# Patient Record
Sex: FEMALE | NOT HISPANIC OR LATINO | Employment: FULL TIME | URBAN - METROPOLITAN AREA
[De-identification: names, ages, dates, MRNs, and addresses within clinical notes are randomized per-mention and may not be internally consistent; named-entity substitution may affect disease eponyms.]

---

## 2017-04-10 DIAGNOSIS — I10 ESSENTIAL HYPERTENSION: ICD-10-CM

## 2017-04-10 DIAGNOSIS — E55.9 VITAMIN D DEFICIENCY: ICD-10-CM

## 2017-04-11 RX ORDER — HYDROCHLOROTHIAZIDE 12.5 MG/1
TABLET ORAL
Qty: 90 TABLET | Refills: 2 | Status: SHIPPED | OUTPATIENT
Start: 2017-04-11 | End: 2017-12-11

## 2017-04-11 RX ORDER — CHOLECALCIFEROL (VITAMIN D3) 50 MCG
TABLET ORAL
Qty: 90 TABLET | Refills: 1 | Status: SHIPPED | OUTPATIENT
Start: 2017-04-11 | End: 2017-11-08

## 2017-04-12 ENCOUNTER — TELEPHONE (OUTPATIENT)
Dept: FAMILY MEDICINE | Facility: CLINIC | Age: 62
End: 2017-04-12

## 2017-04-12 DIAGNOSIS — E78.5 HYPERLIPIDEMIA LDL GOAL <130: ICD-10-CM

## 2017-04-12 DIAGNOSIS — I10 ESSENTIAL HYPERTENSION: ICD-10-CM

## 2017-04-12 RX ORDER — SIMVASTATIN 40 MG
40 TABLET ORAL AT BEDTIME
Qty: 90 TABLET | Refills: 1 | Status: SHIPPED | OUTPATIENT
Start: 2017-04-12 | End: 2017-12-11

## 2017-04-12 RX ORDER — LISINOPRIL 20 MG/1
20 TABLET ORAL DAILY
Qty: 90 TABLET | Refills: 1 | Status: SHIPPED | OUTPATIENT
Start: 2017-04-12 | End: 2017-10-11

## 2017-04-12 NOTE — TELEPHONE ENCOUNTER
Prescription approved per [facility] Refill Protocol.  Silvana Loco RN

## 2017-04-12 NOTE — TELEPHONE ENCOUNTER
Reason for Call:  Other prescription    Detailed comments: lisprinol /simvastatin pt got other meds approved but not these 2 as was on previous request    Phone Number Patient can be reached at: Cell number on file:    Telephone Information:   Mobile [phone]       Best Time: anyu    Can we leave a detailed message on this number? YES    Call taken on 4/12/2017 at 7:10 AM by Yue Holcomb

## 2017-04-18 ENCOUNTER — TELEPHONE (OUTPATIENT)
Dept: FAMILY MEDICINE | Facility: CLINIC | Age: 62
End: 2017-04-18

## 2017-04-18 NOTE — TELEPHONE ENCOUNTER
Reason for Call:  Other appointment    Detailed comments: pt would like to set up new pt consult for her son. Pt was given ok'd personally by PS. Please confirm ok'd for new pt consult.    Phone Number Patient can be reached at: Cell number on file:    Telephone Information:   Mobile [phone]       Best Time: any    Can we leave a detailed message on this number? YES    Call taken on 4/18/2017 at 1:56 PM by Curt Henson

## 2017-04-18 NOTE — TELEPHONE ENCOUNTER
Spoke with mom and advised that Dr. Baxter would accept pt. It looks like this is   A pt who is established with Dr. Malagon. I will note in the pt's chart that Dr. Baxter with accept pt.

## 2017-05-19 ENCOUNTER — ALLIED HEALTH/NURSE VISIT (OUTPATIENT)
Dept: NURSING | Facility: CLINIC | Age: 62
End: 2017-05-19
Payer: COMMERCIAL

## 2017-05-19 DIAGNOSIS — Z11.1 SCREENING EXAMINATION FOR PULMONARY TUBERCULOSIS: Primary | ICD-10-CM

## 2017-05-19 PROCEDURE — 86580 TB INTRADERMAL TEST: CPT

## 2017-05-19 PROCEDURE — 99207 ZZC NO CHARGE NURSE ONLY: CPT

## 2017-05-19 NOTE — LETTER
[facility]  [address]  [facility] [address]  [phone]          May 19, 2017    RE:  Teresa Herron                                                                                                      1955                                                 [address]   [facility] [address]            To whom it may concern: [facility] - Nursing Staff    Teresa Herron was seen by our clinic today for her Mantoux administration. Below you will find the details of the administration of this Mantoux test:    Tubersol - [employer] Rosemaryuer  NDC: 05234-494-74  Lot: [id number]  Exp: 11/3/18  Administered by Catarino Walsh CMA at 8 am on 5/19/17    Reading must take place between 5/21/17 - 5/22/17. Please fax us the results of this reading so we can close this chart. You can fax this to [phone], attn: nursing staff.    If there are any questions please call our office at [phone]    Thanks,        Catarino Walsh CMA   [facility]

## 2017-05-19 NOTE — MR AVS SNAPSHOT
"              After Visit Summary   5/19/2017    Teresa Herron    MRN: [id number]           Patient Information     Date Of Birth          1955        Visit Information        Provider Department      5/19/2017 9:45 AM CS NURSE [facility]        Today's Diagnoses     Screening examination for pulmonary tuberculosis    -  1       Follow-ups after your visit        Your next 10 appointments already scheduled     May 19, 2017  9:45 AM CDT   Nurse Only with [facility]    [address]  [place] [address] [phone]              Who to contact     If you have questions or need follow up information about today's clinic visit or your schedule please contact [facility] directly at [phone].  Normal or non-critical lab and imaging results will be communicated to you by ViralGainshart, letter or phone within 4 business days after the clinic has received the results. If you do not hear from us within 7 days, please contact the clinic through ViralGainshart or phone. If you have a critical or abnormal lab result, we will notify you by phone as soon as possible.  Submit refill requests through Veritract or call your pharmacy and they will forward the refill request to us. Please allow 3 business days for your refill to be completed.          Additional Information About Your Visit        ViralGainshart Information     Veritract lets you send messages to your doctor, view your test results, renew your prescriptions, schedule appointments and more. To sign up, go to [email or web address] . Click on \"Log in\" on the left side of the screen, which will take you to the Welcome page. Then click on \"Sign up Now\" on the right side of the page.     You will be asked to enter the access code listed below, as well as some personal information. Please follow the directions to create your username and password.     Your access code is: [email or web address]  Expires: 8/17/2017  8:41 " AM     Your access code will  in 90 days. If you need help or a new code, please call your [place] clinic or [phone].        Care EveryWhere ID     This is your Care EveryWhere ID. This could be used by other organizations to access your [place] medical records  [id number]         Blood Pressure from Last 3 Encounters:   16 135/88   16 126/88   10/23/15 119/81    Weight from Last 3 Encounters:   16 148 lb 14.4 oz (67.5 kg)   16 152 lb 1.6 oz (69 kg)   10/23/15 147 lb (66.7 kg)              We Performed the Following     TB INTRADERMAL TEST        Primary Care Provider Office Phone # Fax #    Yoni Baxter [phone]       [facility] [address]  [place] [address]        Thank you!     Thank you for choosing [facility]  for your care. Our goal is always to provide you with excellent care. Hearing back from our patients is one way we can continue to improve our services. Please take a few minutes to complete the written survey that you may receive in the mail after your visit with us. Thank you!             Your Updated Medication List - Protect others around you: Learn how to safely use, store and throw away your medicines at www.disposemymeds.org.          This list is accurate as of: 17  8:41 AM.  Always use your most recent med list.                   Brand Name Dispense Instructions for use    ASPIRIN NOT PRESCRIBED    INTENTIONAL    1 each    1 each continuous prn for other Antiplatelet medication not prescribed intentionally due to Not indicated based on age       hydrochlorothiazide 12.5 MG Tabs tablet     90 tablet    TAKE ONE TABLET BY MOUTH ONE TIME DAILY       lisinopril 20 MG tablet    PRINIVIL/ZESTRIL    90 tablet    Take 1 tablet (20 mg) by mouth daily       simvastatin 40 MG tablet    ZOCOR    90 tablet    Take 1 tablet (40 mg) by mouth At Bedtime       vitamin D 2000 UNITS tablet     90 tablet     TAKE ONE TABLET BY MOUTH ONE TIME DAILY

## 2017-05-19 NOTE — PROGRESS NOTES
[phone], fax [phone].    Also requesting that [employer] fax us results of the mantoux.    Catarino Walsh, CMA

## 2017-06-06 ENCOUNTER — ALLIED HEALTH/NURSE VISIT (OUTPATIENT)
Dept: NURSING | Facility: CLINIC | Age: 62
End: 2017-06-06
Payer: COMMERCIAL

## 2017-06-06 DIAGNOSIS — Z11.1 SCREENING EXAMINATION FOR PULMONARY TUBERCULOSIS: Primary | ICD-10-CM

## 2017-06-06 PROCEDURE — 86580 TB INTRADERMAL TEST: CPT

## 2017-06-06 PROCEDURE — 99207 ZZC NO CHARGE NURSE ONLY: CPT

## 2017-06-06 NOTE — NURSING NOTE
"Chief Complaint   Patient presents with     Mantoux Administration       Initial There were no vitals taken for this visit. Estimated body mass index is 31.12 kg/(m^2) as calculated from the following:    Height as of 11/28/16: 4' 10\" (1.473 m).    Weight as of 11/28/16: 148 lb 14.4 oz (67.5 kg).  Medication Reconciliation: unable or not appropriate to perform     Linda Payne MA    Per orders of Dr. Baxter, injection of mantoux given by Linda Payne. Patient instructed to remain in clinic for 20 minutes afterwards, and to report any adverse reaction to me immediately.      "

## 2017-06-08 ENCOUNTER — MEDICAL CORRESPONDENCE (OUTPATIENT)
Dept: HEALTH INFORMATION MANAGEMENT | Facility: CLINIC | Age: 62
End: 2017-06-08

## 2017-10-11 DIAGNOSIS — I10 ESSENTIAL HYPERTENSION: ICD-10-CM

## 2017-10-11 RX ORDER — LISINOPRIL 20 MG/1
TABLET ORAL
Qty: 30 TABLET | Refills: 1 | Status: SHIPPED | OUTPATIENT
Start: 2017-10-11 | End: 2017-12-11

## 2017-10-11 NOTE — TELEPHONE ENCOUNTER
Prescription approved per [facility] Refill Protocol.    Annmarie Pierre, BS, RN, N  [facility]) [phone]

## 2017-10-11 NOTE — TELEPHONE ENCOUNTER
lisinopril (PRINIVIL/ZESTRIL) 20 MG tablet        Last Written Prescription Date: 4/12/2017  Last Fill Quantity: 90, # refills: 1  Last Office Visit with G, [facility] or [facility] prescribing provider: 11/28/2016       Potassium   Date Value Ref Range Status   11/28/2016 4.2 3.4 - 5.3 mmol/L Final     Creatinine   Date Value Ref Range Status   11/28/2016 0.65 0.52 - 1.04 mg/dL Final     BP Readings from Last 3 Encounters:   11/28/16 135/88   04/04/16 126/88   10/23/15 119/81

## 2017-11-08 DIAGNOSIS — E55.9 VITAMIN D DEFICIENCY: ICD-10-CM

## 2017-11-09 RX ORDER — CHOLECALCIFEROL (VITAMIN D3) 50 MCG
TABLET ORAL
Qty: 30 TABLET | Refills: 0 | Status: SHIPPED | OUTPATIENT
Start: 2017-11-09 | End: 2020-09-04

## 2017-11-09 NOTE — TELEPHONE ENCOUNTER
Medication is being filled for 1 time refill only due to:  Patient needs to be seen because due for physical this month.   Tianna WILCOX RN    Requested Prescriptions   Pending Prescriptions Disp Refills     Cholecalciferol (VITAMIN D) 2000 UNITS tablet [Pharmacy Med Name: VITAMIN D3 2,000 UNIT TABLET] 90 tablet 1     Sig: TAKE ONE TABLET BY MOUTH ONE TIME DAILY    Vitamin Supplements (Adult) Protocol Passed    11/8/2017  6:56 PM       Passed - High dose Vitamin D not ordered       Passed - Recent or future visit with authorizing provider's specialty    Patient had office visit in the last year or has a visit in the next 30 days with authorizing provider.  See chart review.              Passed - Patient is age 18 or older

## 2017-11-17 ENCOUNTER — TELEPHONE (OUTPATIENT)
Dept: FAMILY MEDICINE | Facility: CLINIC | Age: 62
End: 2017-11-17

## 2017-11-17 NOTE — TELEPHONE ENCOUNTER
Reason for Call:  Other appointment    Detailed comments: The patient wants to have a physical on Monday   First available was offered on Wednesday 11/22 at 1:30   But she wants to have it done on Monday   The patient said she wants her labs done and she has not been feeling well  And she wants a referral to Ortho     Phone Number Patient can be reached at: Cell number on file:    Telephone Information:   Mobile [phone]       Best Time: anytime    Can we leave a detailed message on this number? YES    Call taken on 11/17/2017 at 9:45 AM by Lyndsey Pascal

## 2017-11-17 NOTE — TELEPHONE ENCOUNTER
Sorry, but don't do work in for routine physicals.  If she has issues and wants to just address those then can do work in but will be limited office visit, let me know    Thanks    Yoni Baxter M.D.

## 2017-11-28 ENCOUNTER — TRANSFERRED RECORDS (OUTPATIENT)
Dept: HEALTH INFORMATION MANAGEMENT | Facility: CLINIC | Age: 62
End: 2017-11-28

## 2017-12-11 ENCOUNTER — OFFICE VISIT (OUTPATIENT)
Dept: FAMILY MEDICINE | Facility: CLINIC | Age: 62
End: 2017-12-11
Payer: COMMERCIAL

## 2017-12-11 VITALS
WEIGHT: 147.2 LBS | DIASTOLIC BLOOD PRESSURE: 77 MMHG | HEIGHT: 58 IN | BODY MASS INDEX: 30.9 KG/M2 | SYSTOLIC BLOOD PRESSURE: 131 MMHG | HEART RATE: 63 BPM | TEMPERATURE: 97.6 F | OXYGEN SATURATION: 97 %

## 2017-12-11 DIAGNOSIS — Z00.00 ROUTINE GENERAL MEDICAL EXAMINATION AT A HEALTH CARE FACILITY: Primary | ICD-10-CM

## 2017-12-11 DIAGNOSIS — I10 BENIGN ESSENTIAL HYPERTENSION: ICD-10-CM

## 2017-12-11 DIAGNOSIS — E11.9 TYPE 2 DIABETES MELLITUS WITHOUT COMPLICATION, WITHOUT LONG-TERM CURRENT USE OF INSULIN (H): ICD-10-CM

## 2017-12-11 DIAGNOSIS — Z23 NEED FOR PROPHYLACTIC VACCINATION AND INOCULATION AGAINST INFLUENZA: ICD-10-CM

## 2017-12-11 DIAGNOSIS — E55.9 VITAMIN D DEFICIENCY: ICD-10-CM

## 2017-12-11 DIAGNOSIS — E66.9 NON MORBID OBESITY, UNSPECIFIED OBESITY TYPE: ICD-10-CM

## 2017-12-11 DIAGNOSIS — E78.5 HYPERLIPIDEMIA LDL GOAL <130: ICD-10-CM

## 2017-12-11 LAB
ERYTHROCYTE [DISTWIDTH] IN BLOOD BY AUTOMATED COUNT: 14.9 % (ref 10–15)
HBA1C MFR BLD: 6.5 % (ref 4.3–6)
HCT VFR BLD AUTO: 39.5 % (ref 35–47)
HGB BLD-MCNC: 12.4 G/DL (ref 11.7–15.7)
MCH RBC QN AUTO: 28 PG (ref 26.5–33)
MCHC RBC AUTO-ENTMCNC: 31.4 G/DL (ref 31.5–36.5)
MCV RBC AUTO: 89 FL (ref 78–100)
PLATELET # BLD AUTO: 256 10E9/L (ref 150–450)
RBC # BLD AUTO: 4.43 10E12/L (ref 3.8–5.2)
WBC # BLD AUTO: 12 10E9/L (ref 4–11)

## 2017-12-11 PROCEDURE — 84443 ASSAY THYROID STIM HORMONE: CPT | Performed by: INTERNAL MEDICINE

## 2017-12-11 PROCEDURE — 82043 UR ALBUMIN QUANTITATIVE: CPT | Performed by: INTERNAL MEDICINE

## 2017-12-11 PROCEDURE — 99207 C FOOT EXAM  NO CHARGE: CPT | Mod: 25 | Performed by: INTERNAL MEDICINE

## 2017-12-11 PROCEDURE — 99396 PREV VISIT EST AGE 40-64: CPT | Mod: 25 | Performed by: INTERNAL MEDICINE

## 2017-12-11 PROCEDURE — 80053 COMPREHEN METABOLIC PANEL: CPT | Performed by: INTERNAL MEDICINE

## 2017-12-11 PROCEDURE — 90686 IIV4 VACC NO PRSV 0.5 ML IM: CPT | Performed by: INTERNAL MEDICINE

## 2017-12-11 PROCEDURE — 90471 IMMUNIZATION ADMIN: CPT | Performed by: INTERNAL MEDICINE

## 2017-12-11 PROCEDURE — 83036 HEMOGLOBIN GLYCOSYLATED A1C: CPT | Performed by: INTERNAL MEDICINE

## 2017-12-11 PROCEDURE — 82306 VITAMIN D 25 HYDROXY: CPT | Performed by: INTERNAL MEDICINE

## 2017-12-11 PROCEDURE — 85027 COMPLETE CBC AUTOMATED: CPT | Performed by: INTERNAL MEDICINE

## 2017-12-11 PROCEDURE — 36415 COLL VENOUS BLD VENIPUNCTURE: CPT | Performed by: INTERNAL MEDICINE

## 2017-12-11 PROCEDURE — 80061 LIPID PANEL: CPT | Performed by: INTERNAL MEDICINE

## 2017-12-11 RX ORDER — HYDROCHLOROTHIAZIDE 12.5 MG/1
12.5 TABLET ORAL DAILY
Qty: 90 TABLET | Refills: 3 | Status: SHIPPED | OUTPATIENT
Start: 2017-12-11 | End: 2018-08-13

## 2017-12-11 RX ORDER — SIMVASTATIN 40 MG
40 TABLET ORAL AT BEDTIME
Qty: 90 TABLET | Refills: 3 | Status: SHIPPED | OUTPATIENT
Start: 2017-12-11 | End: 2018-08-13

## 2017-12-11 RX ORDER — LISINOPRIL 20 MG/1
TABLET ORAL
Qty: 90 TABLET | Refills: 3 | Status: SHIPPED | OUTPATIENT
Start: 2017-12-11 | End: 2018-08-13

## 2017-12-11 RX ORDER — OMEGA-3 FATTY ACIDS/FISH OIL 300-1000MG
1 CAPSULE ORAL DAILY
Qty: 90 CAPSULE | COMMUNITY
End: 2024-06-17

## 2017-12-11 NOTE — NURSING NOTE
"Chief Complaint   Patient presents with     Physical       Initial /77 (BP Location: Right arm, Patient Position: Chair, Cuff Size: Adult Large)  Pulse 63  Temp 97.6  F (36.4  C) (Oral)  Ht 4' 10\" (1.473 m)  Wt 147 lb 3.2 oz (66.8 kg)  SpO2 97%  Breastfeeding? No  BMI 30.76 kg/m2 Estimated body mass index is 30.76 kg/(m^2) as calculated from the following:    Height as of this encounter: 4' 10\" (1.473 m).    Weight as of this encounter: 147 lb 3.2 oz (66.8 kg).  Medication Reconciliation: complete.  Leslie Jaffe CMA    "

## 2017-12-11 NOTE — MR AVS SNAPSHOT
After Visit Summary   12/11/2017    Teresa Herron    MRN: [id number]           Patient Information     Date Of Birth          1955        Visit Information        Provider Department      12/11/2017 12:30 PM Yoni Baxter MD [facility]        Today's Diagnoses     Routine general medical examination at a health care facility    -  1    Hyperlipidemia LDL goal <130        Type 2 diabetes mellitus without complication, without long-term current use of insulin (H)        Vitamin D deficiency        Benign essential hypertension        Non morbid obesity, unspecified obesity type          Care Instructions    I will send you the labs from today.    Please get a carpal tunnel wrist splint and wear that at night in bed and see if this fixes the numbness in your hands and arm.    Yoni Baxter M.D.                Preventive Health Recommendations  Female Ages 50 - 64    Yearly exam: See your health care provider every year in order to  o Review health changes.   o Discuss preventive care.    o Review your medicines if your doctor has prescribed any.      Get a Pap test every three years (unless you have an abnormal result and your provider advises testing more often).    If you get Pap tests with HPV test, you only need to test every 5 years, unless you have an abnormal result.     You do not need a Pap test if your uterus was removed (hysterectomy) and you have not had cancer.    You should be tested each year for STDs (sexually transmitted diseases) if you're at risk.     Have a mammogram every 1 to 2 years.    Have a colonoscopy at age 50, or have a yearly FIT test (stool test). These exams screen for colon cancer.      Have a cholesterol test every 5 years, or more often if advised.    Have a diabetes test (fasting glucose) every three years. If you are at risk for diabetes, you should have this test more often.     If you are at risk for osteoporosis (brittle bone disease),  "think about having a bone density scan (DEXA).    Shots: Get a flu shot each year. Get a tetanus shot every 10 years.    Nutrition:     Eat at least 5 servings of fruits and vegetables each day.    Eat whole-grain bread, whole-wheat pasta and brown rice instead of white grains and rice.    Talk to your provider about Calcium and Vitamin D.     Lifestyle    Exercise at least 150 minutes a week (30 minutes a day, 5 days a week). This will help you control your weight and prevent disease.    Limit alcohol to one drink per day.    No smoking.     Wear sunscreen to prevent skin cancer.     See your dentist every six months for an exam and cleaning.    See your eye doctor every 1 to 2 years.            Follow-ups after your visit        Who to contact     If you have questions or need follow up information about today's clinic visit or your schedule please contact [facility] directly at [phone].  Normal or non-critical lab and imaging results will be communicated to you by MyChart, letter or phone within 4 business days after the clinic has received the results. If you do not hear from us within 7 days, please contact the clinic through MBW Enterprisehart or phone. If you have a critical or abnormal lab result, we will notify you by phone as soon as possible.  Submit refill requests through Hatsize or call your pharmacy and they will forward the refill request to us. Please allow 3 business days for your refill to be completed.          Additional Information About Your Visit        Hatsize Information     Hatsize lets you send messages to your doctor, view your test results, renew your prescriptions, schedule appointments and more. To sign up, go to [email or web address] . Click on \"Log in\" on the left side of the screen, which will take you to the Welcome page. Then click on \"Sign up Now\" on the right side of the page.     You will be asked to enter the access code listed below, as well as some personal " "information. Please follow the directions to create your username and password.     Your access code is: [email or web address]  Expires: 3/11/2018 12:56 PM     Your access code will  in 90 days. If you need help or a new code, please call your [place] clinic or [phone].        Care EveryWhere ID     This is your Care EveryWhere ID. This could be used by other organizations to access your [place] medical records  [id number]        Your Vitals Were     Pulse Temperature Height Pulse Oximetry Breastfeeding? BMI (Body Mass Index)    63 97.6  F (36.4  C) (Oral) 4' 10\" (1.473 m) 97% No 30.76 kg/m2       Blood Pressure from Last 3 Encounters:   17 131/77   16 135/88   16 126/88    Weight from Last 3 Encounters:   17 147 lb 3.2 oz (66.8 kg)   16 148 lb 14.4 oz (67.5 kg)   16 152 lb 1.6 oz (69 kg)              We Performed the Following     Albumin Random Urine Quantitative with Creat Ratio     C FOOT EXAM  NO CHARGE     CBC with platelets     Comprehensive metabolic panel     Hemoglobin A1c     Lipid panel reflex to direct LDL Non-fasting     TSH with free T4 reflex     Vitamin D Deficiency          Today's Medication Changes          These changes are accurate as of: 17 12:56 PM.  If you have any questions, ask your nurse or doctor.               These medicines have changed or have updated prescriptions.        Dose/Directions    hydrochlorothiazide 12.5 MG Tabs tablet   This may have changed:  See the new instructions.   Used for:  Benign essential hypertension   Changed by:  Yoni Baxter MD        Dose:  12.5 mg   Take 1 tablet (12.5 mg) by mouth daily   Quantity:  90 tablet   Refills:  3       lisinopril 20 MG tablet   Commonly known as:  PRINIVIL/ZESTRIL   This may have changed:  See the new instructions.   Used for:  Benign essential hypertension   Changed by:  Yoni Baxter MD        TAKE 1 TABLET (20 MG) BY MOUTH DAILY   Quantity:  90 tablet   Refills: "  3            Where to get your medicines      These medications were sent to [facility] [address] [facility] - [place], [place] - [address]   [address] , [facility] [address]     Phone:  [phone]     hydrochlorothiazide 12.5 MG Tabs tablet    lisinopril 20 MG tablet    simvastatin 40 MG tablet                Primary Care Provider Office Phone # Fax #    Yoni Baxter [phone] [address] [facility] [address]  [facility] [address]        Equal Access to Services     [facility]OUGL[facility] : Hadii aad ku hadasho Soomaali, waaxda luqadaha, qaybta kaalmada adeegyada, waxay idiin hayaan adeeg khararichard nova . So [facility] [phone].    ATENCIÓN: Si habla español, tiene a farias disposición servicios gratuitos de asistencia lingüística. [facility] [phone].    We comply with applicable federal civil rights laws and [place] laws. We do not discriminate on the basis of race, color, national origin, age, disability, sex, sexual orientation, or gender identity.            Thank you!     Thank you for choosing [facility]  for your care. Our goal is always to provide you with excellent care. Hearing back from our patients is one way we can continue to improve our services. Please take a few minutes to complete the written survey that you may receive in the mail after your visit with us. Thank you!             Your Updated Medication List - Protect others around you: Learn how to safely use, store and throw away your medicines at www.disposemymeds.org.          This list is accurate as of: 12/11/17 12:56 PM.  Always use your most recent med list.                   Brand Name Dispense Instructions for use Diagnosis    ASPIRIN NOT PRESCRIBED    INTENTIONAL    1 each    1 each continuous prn for other Antiplatelet medication not prescribed intentionally due to Not indicated based on age    Type 2 diabetes, HbA1c goal < 7% (H)       hydrochlorothiazide 12.5 MG Tabs tablet     90 tablet    Take 1 tablet (12.5 mg)  by mouth daily    Benign essential hypertension       lisinopril 20 MG tablet    PRINIVIL/ZESTRIL    90 tablet    TAKE 1 TABLET (20 MG) BY MOUTH DAILY    Benign essential hypertension       omega 3 1000 MG Caps     90 capsule    Take 1 g by mouth daily        simvastatin 40 MG tablet    ZOCOR    90 tablet    Take 1 tablet (40 mg) by mouth At Bedtime    Hyperlipidemia LDL goal <130       vitamin D 2000 UNITS tablet     30 tablet    TAKE ONE TABLET BY MOUTH ONE TIME DAILY    Vitamin D deficiency

## 2017-12-11 NOTE — PROGRESS NOTES
SUBJECTIVE:   CC: Teresa Herron is an 62 year old woman who presents for preventive health visit.     The patient has niddm not on meds for it, does not check sugars, up to date eye exam, no sores on feet or toes.    She has djd of knees and seeing ortho for this    Some t/n fingers and forearms at night, sometimes in day, comes and goes.    She is not working out a lot but busy all day long.  No chest pain or shortness of breath.  No gi or genitourinary c/o.  Occasionally cramps in feet or toes, no sid, not always taking the statin reg.    Healthy Habits:    Do you get at least three servings of calcium containing foods daily (dairy, green leafy vegetables, etc.)? yes    Amount of exercise or daily activities, outside of work: walks 7 day(s) per week    Problems taking medications regularly No    Medication side effects: No    Have you had an eye exam in the past two years? yes    Do you see a dentist twice per year? no    Do you have sleep apnea, excessive snoring or daytime drowsiness?no            Today's PHQ-2 Score:   PHQ-2 ( 1999 Pfizer) 11/28/2016 10/23/2015   Q1: Little interest or pleasure in doing things 0 0   Q2: Feeling down, depressed or hopeless 0 0   PHQ-2 Score 0 0         Abuse: Current or Past(Physical, Sexual or Emotional)- No  Do you feel safe in your environment - Yes  Social History   Substance Use Topics     Smoking status: Never Smoker     Smokeless tobacco: Never Used     Alcohol use No     The patient does not drink >3 drinks per day nor >7 drinks per week.                Past Medical History:      Past Medical History:   Diagnosis Date     Chest pain 4/15    nl est echo     Dizziness years     GERD (gastroesophageal reflux disease)      H/O colonoscopy 2014    tics, tortuous, fu 5 years     HTN (hypertension) 1988     Hypercholesteraemia 2010     Ruptured appendicitis 5/14    had surgery fsd     Type II or unspecified type diabetes mellitus without mention of complication, not  stated as uncontrolled      Vitamin D deficiency              Past Surgical History:      Past Surgical History:   Procedure Laterality Date     COLONOSCOPY N/A 11/18/2014    Procedure: COLONOSCOPY;  Surgeon: Yoni Carvajal MD;  Location:  GI     fibroid surgery  2000     HYSTERECTOMY, PAP NO LONGER INDICATED  2006    estrada/bso due to pain and bleeding     LAPAROSCOPIC APPENDECTOMY  5/19/2014    Procedure: LAPAROSCOPIC APPENDECTOMY;  Surgeon: Will Barth MD;  Location: [facility]             Social History:     Social History     Social History     Marital status:      Spouse name: N/A     Number of children: 1     Years of education: N/A     Occupational History     work dietary, in kitchen [employer]     Social History Main Topics     Smoking status: Never Smoker     Smokeless tobacco: Never Used     Alcohol use No     Drug use: No     Sexual activity: Not Currently     Other Topics Concern     Not on file     Social History Narrative             Family History:   reviewed         Allergies:   No Known Allergies          Medications:     Current Outpatient Prescriptions   Medication Sig Dispense Refill     omega 3 1000 MG CAPS Take 1 g by mouth daily 90 capsule      lisinopril (PRINIVIL/ZESTRIL) 20 MG tablet TAKE 1 TABLET (20 MG) BY MOUTH DAILY 90 tablet 3     simvastatin (ZOCOR) 40 MG tablet Take 1 tablet (40 mg) by mouth At Bedtime 90 tablet 3     hydrochlorothiazide 12.5 MG TABS tablet Take 1 tablet (12.5 mg) by mouth daily 90 tablet 3     Cholecalciferol (VITAMIN D) 2000 UNITS tablet TAKE ONE TABLET BY MOUTH ONE TIME DAILY 30 tablet 0     ASPIRIN NOT PRESCRIBED, INTENTIONAL, 1 each continuous prn for other Antiplatelet medication not prescribed intentionally due to Not indicated based on age 1 each 0     [DISCONTINUED] lisinopril (PRINIVIL/ZESTRIL) 20 MG tablet TAKE 1 TABLET (20 MG) BY MOUTH DAILY 30 tablet 1     [DISCONTINUED] simvastatin (ZOCOR) 40 MG tablet Take 1 tablet (40  "mg) by mouth At Bedtime 90 tablet 1     [DISCONTINUED] hydrochlorothiazide 12.5 MG TABS tablet TAKE ONE TABLET BY MOUTH ONE TIME DAILY 90 tablet 2               Review of Systems:   The 10 point Review of Systems is negative other than noted in the HPI           Physical Exam:   Blood pressure 131/77, pulse 63, temperature 97.6  F (36.4  C), temperature source Oral, height 4' 10\" (1.473 m), weight 147 lb 3.2 oz (66.8 kg), SpO2 97 %, not currently breastfeeding.    Exam:  Constitutional: healthy appearing, alert and in no distress  Heent: Normocephalic. Head without obvious masses or lesions. PERRLDC, EOMI. Mouth exam within normal limits: tongue, mucous membranes, posterior pharynx all normal, no lesions or abnormalities seen.  Tm's and canals within normal limits bilaterally. Neck supple, no nuchal rigidity or masses. No supraclavicular, or cervical adenopathy. Thyroid symmetric, no masses.  Cardiovascular: Regular rate and rhythm, no murmer, rub or gallops.  JVP not elevated, no edema.  Carotids within normal limits bilaterally, no bruits.  Respiratory: Normal respiratory effort.  Lungs clear, normal flow, no wheezing or crackles.  Breasts: Normal bilaterally.  No masses or lesions.  Nipples within normal limits.  No axillary lesions or nodes.  My M.A. Was present during this part of the examination.  Gastrointestinal: Normal active bowel sounds.   Soft, not tender, no masses, guarding or rebound.  No hepatosplenomegaly.   Musculoskeletal: extremities normal, no gross deformities noted.  Skin: no suspicious lesions or rashes   Neurologic: Mental status within normal limits.  Speech fluent.  No gross motor abnormalities and gait intact.  Psychiatric: mentation appears normal and affect normal.         Data:   Labs sent        Assessment:   1. Normal complete physical exam  2. Niddm, not on meds, no c/o  3. Elevated cholesterol, not always taking statin  4. Vit d defic, follow up today  5. Hypertension, control " ok  6. 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 Obesity  8. hcm         Plan:   Flu shot  Letter with labs  Exercise, diet and weight loss  Try ct splint and if not better soon call        Yoni Baxter M.D.                                Injectable Influenza Immunization Documentation    1.  Is the person to be vaccinated sick today?   No    2. Does the person to be vaccinated have an allergy to a component   of the vaccine?   No  Egg Allergy Algorithm Link    3. Has the person to be vaccinated ever had a serious reaction   to influenza vaccine in the past?   No    4. Has the person to be vaccinated ever had Guillain-Barré syndrome?   No    Form completed by Leslie Jaffe CMA

## 2017-12-11 NOTE — PATIENT INSTRUCTIONS
I will send you the labs from today.    Please get a carpal tunnel wrist splint and wear that at night in bed and see if this fixes the numbness in your hands and arm.    Yoni Baxter M.D.                Preventive Health Recommendations  Female Ages 50 - 64    Yearly exam: See your health care provider every year in order to  o Review health changes.   o Discuss preventive care.    o Review your medicines if your doctor has prescribed any.      Get a Pap test every three years (unless you have an abnormal result and your provider advises testing more often).    If you get Pap tests with HPV test, you only need to test every 5 years, unless you have an abnormal result.     You do not need a Pap test if your uterus was removed (hysterectomy) and you have not had cancer.    You should be tested each year for STDs (sexually transmitted diseases) if you're at risk.     Have a mammogram every 1 to 2 years.    Have a colonoscopy at age 50, or have a yearly FIT test (stool test). These exams screen for colon cancer.      Have a cholesterol test every 5 years, or more often if advised.    Have a diabetes test (fasting glucose) every three years. If you are at risk for diabetes, you should have this test more often.     If you are at risk for osteoporosis (brittle bone disease), think about having a bone density scan (DEXA).    Shots: Get a flu shot each year. Get a tetanus shot every 10 years.    Nutrition:     Eat at least 5 servings of fruits and vegetables each day.    Eat whole-grain bread, whole-wheat pasta and brown rice instead of white grains and rice.    Talk to your provider about Calcium and Vitamin D.     Lifestyle    Exercise at least 150 minutes a week (30 minutes a day, 5 days a week). This will help you control your weight and prevent disease.    Limit alcohol to one drink per day.    No smoking.     Wear sunscreen to prevent skin cancer.     See your dentist every six months for an exam and  cleaning.    See your eye doctor every 1 to 2 years.

## 2017-12-11 NOTE — LETTER
[facility]  [address] [facility]  [address]  [place]  [address]  Tel: [phone]    December 13, 2017    Teresa Herron  [address]   [facility] [address]        Dear Ms. Herron,    It was a pleasure seeing you.  I wanted to get back to you with your test results.  I have enclosed a copy for your records.     For the most part your labs look very good including your complete blood count, blood salts, kidney tests, liver tests, proteins, vitamin D level, urine, and thyroid test.     Your diabetes test or a1c is a bit higher this time at 6.5.   Please be sure to exercise and keep your weight down for this.     Your cholesterol is much too high as you can see.  I know you have not been taking the simvastatin regularly but it is important to get this down.  There are other cholesterol medications we could try, if you are having side effects with the simvastatin.  Please let me know if you want to do this, otherwise if you can, please take the simvastatin daily.  I would ask that you come back to see me in 6 months for a follow up office visit and to repeat some of the labs.     If you have any questions please call me.       Sincerely,    Yoni Baxter MD/faheem    Results for orders placed or performed in visit on 12/11/17   CBC with platelets   Result Value Ref Range    WBC 12.0 (H) 4.0 - 11.0 10e9/L    RBC Count 4.43 3.8 - 5.2 10e12/L    Hemoglobin 12.4 11.7 - 15.7 g/dL    Hematocrit 39.5 35.0 - 47.0 %    MCV 89 78 - 100 fl    MCH 28.0 26.5 - 33.0 pg    MCHC 31.4 (L) 31.5 - 36.5 g/dL    RDW 14.9 10.0 - 15.0 %    Platelet Count 256 150 - 450 10e9/L   Comprehensive metabolic panel   Result Value Ref Range    Sodium 138 133 - 144 mmol/L    Potassium 3.9 3.4 - 5.3 mmol/L    Chloride 104 94 - 109 mmol/L    Carbon Dioxide 23 20 - 32 mmol/L    Anion Gap 11 3 - 14 mmol/L    Glucose 85 70 - 99 mg/dL    Urea Nitrogen 17 7 - 30 mg/dL    Creatinine 0.58 0.52 - 1.04 mg/dL    GFR Estimate >90 >60  mL/min/1.7m2    GFR Estimate If Black >90 >60 mL/min/1.7m2    Calcium 8.7 8.5 - 10.1 mg/dL    Bilirubin Total 0.5 0.2 - 1.3 mg/dL    Albumin 3.3 (L) 3.4 - 5.0 g/dL    Protein Total 7.5 6.8 - 8.8 g/dL    Alkaline Phosphatase 69 40 - 150 U/L    ALT 23 0 - 50 U/L    AST 11 0 - 45 U/L   Hemoglobin A1c   Result Value Ref Range    Hemoglobin A1C 6.5 (H) 4.3 - 6.0 %   Albumin Random Urine Quantitative with Creat Ratio   Result Value Ref Range    Creatinine Urine 150 mg/dL    Albumin Urine mg/L 8 mg/L    Albumin Urine mg/g Cr 5.43 0 - 25 mg/g Cr   Vitamin D Deficiency   Result Value Ref Range    Vitamin D Deficiency screening 36 20 - 75 ug/L   TSH with free T4 reflex   Result Value Ref Range    TSH 1.20 0.40 - 4.00 mU/L   Lipid panel reflex to direct LDL Non-fasting   Result Value Ref Range    Cholesterol 291 (H) <200 mg/dL    Triglycerides 142 <150 mg/dL    HDL Cholesterol 69 >49 mg/dL    LDL Cholesterol Calculated 194 (H) <100 mg/dL    Non HDL Cholesterol 222 (H) <130 mg/dL           Enclosure: Lab Results

## 2017-12-12 LAB
ALBUMIN SERPL-MCNC: 3.3 G/DL (ref 3.4–5)
ALP SERPL-CCNC: 69 U/L (ref 40–150)
ALT SERPL W P-5'-P-CCNC: 23 U/L (ref 0–50)
ANION GAP SERPL CALCULATED.3IONS-SCNC: 11 MMOL/L (ref 3–14)
AST SERPL W P-5'-P-CCNC: 11 U/L (ref 0–45)
BILIRUB SERPL-MCNC: 0.5 MG/DL (ref 0.2–1.3)
BUN SERPL-MCNC: 17 MG/DL (ref 7–30)
CALCIUM SERPL-MCNC: 8.7 MG/DL (ref 8.5–10.1)
CHLORIDE SERPL-SCNC: 104 MMOL/L (ref 94–109)
CHOLEST SERPL-MCNC: 291 MG/DL
CO2 SERPL-SCNC: 23 MMOL/L (ref 20–32)
CREAT SERPL-MCNC: 0.58 MG/DL (ref 0.52–1.04)
CREAT UR-MCNC: 150 MG/DL
DEPRECATED CALCIDIOL+CALCIFEROL SERPL-MC: 36 UG/L (ref 20–75)
GFR SERPL CREATININE-BSD FRML MDRD: >90 ML/MIN/1.7M2
GLUCOSE SERPL-MCNC: 85 MG/DL (ref 70–99)
HDLC SERPL-MCNC: 69 MG/DL
LDLC SERPL CALC-MCNC: 194 MG/DL
MICROALBUMIN UR-MCNC: 8 MG/L
MICROALBUMIN/CREAT UR: 5.43 MG/G CR (ref 0–25)
NONHDLC SERPL-MCNC: 222 MG/DL
POTASSIUM SERPL-SCNC: 3.9 MMOL/L (ref 3.4–5.3)
PROT SERPL-MCNC: 7.5 G/DL (ref 6.8–8.8)
SODIUM SERPL-SCNC: 138 MMOL/L (ref 133–144)
TRIGL SERPL-MCNC: 142 MG/DL
TSH SERPL DL<=0.005 MIU/L-ACNC: 1.2 MU/L (ref 0.4–4)

## 2017-12-13 NOTE — PROGRESS NOTES
It was a pleasure seeing you.  I wanted to get back to you with your test results.  I have enclosed a copy for your records.    For the most part your labs look very good including your complete blood count, blood salts, kidney tests, liver tests, proteins, vitamin D level, urine, and thyroid test.     Your diabetes test or a1c is a bit higher this time at 6.5.   Please be sure to exercise and keep your weight down for this.    Your cholesterol is much too high as you can see.  I know you have not been taking the simvastatin regularly but it is important to get this down.  There are other cholesterol medications we could try, if you are having side effects with the simvastatin.  Please let me know if you want to do this, otherwise if you can, please take the simvastatin daily.  I would ask that you come back to see me in 6 months for a follow up office visit and to repeat some of the labs.    If you have any questions please call me.

## 2018-04-17 ENCOUNTER — OFFICE VISIT (OUTPATIENT)
Dept: FAMILY MEDICINE | Facility: CLINIC | Age: 63
End: 2018-04-17
Payer: COMMERCIAL

## 2018-04-17 VITALS
TEMPERATURE: 97.8 F | SYSTOLIC BLOOD PRESSURE: 130 MMHG | DIASTOLIC BLOOD PRESSURE: 84 MMHG | BODY MASS INDEX: 30.86 KG/M2 | OXYGEN SATURATION: 99 % | HEART RATE: 79 BPM | HEIGHT: 58 IN | WEIGHT: 147 LBS

## 2018-04-17 DIAGNOSIS — R05.9 COUGH: Primary | ICD-10-CM

## 2018-04-17 DIAGNOSIS — J06.9 VIRAL URI: ICD-10-CM

## 2018-04-17 PROCEDURE — 99213 OFFICE O/P EST LOW 20 MIN: CPT | Performed by: INTERNAL MEDICINE

## 2018-04-17 RX ORDER — CODEINE PHOSPHATE AND GUAIFENESIN 10; 100 MG/5ML; MG/5ML
1-2 SOLUTION ORAL EVERY 6 HOURS PRN
Qty: 120 ML | Refills: 0 | Status: SHIPPED | OUTPATIENT
Start: 2018-04-17 | End: 2018-08-13

## 2018-04-17 NOTE — PATIENT INSTRUCTIONS
Use the cough syrup at night, it will make you drowsy    During the day use tylenol as needed, also use mucinex twice daily to bring up the phlegm.  You should be better in the next 3 days but if you get worse call    Yoni Baxter M.D.

## 2018-04-17 NOTE — NURSING NOTE
"Chief Complaint   Patient presents with     URI       Initial /84  Pulse 79  Temp 97.8  F (36.6  C) (Oral)  Ht 4' 10\" (1.473 m)  Wt 147 lb (66.7 kg)  SpO2 99%  Breastfeeding? No  BMI 30.72 kg/m2 Estimated body mass index is 30.72 kg/(m^2) as calculated from the following:    Height as of this encounter: 4' 10\" (1.473 m).    Weight as of this encounter: 147 lb (66.7 kg).  Medication Reconciliation: complete   Vickie Spivey CMA      "

## 2018-04-17 NOTE — LETTER
[place] [address] [facility] [address]  Phone: [phone]    04/17/18    Teresa Herron  [address]   [facility] [facility] [address]      To whom it may concern:     Due to her illness she may not work today or tomorrow.    Sincerely,      Yoni Baxter MD

## 2018-04-17 NOTE — PROGRESS NOTES
Teresa Herron is a 62 year old female who presents for cough and cold.  Began last week, worse last 2 nights.  Not prod, not sure if fever, no chest pain, some slight shortness of breath, no ear pain, slight s.t.  Kids also have it and .  No travel or gi c/o.  No h/o lung dz, non smoker.    Past Medical History:   Diagnosis Date     Chest pain 4/15    nl est echo     Dizziness years     GERD (gastroesophageal reflux disease)      H/O colonoscopy 2014    tics, tortuous, fu 5 years     HTN (hypertension) 1988     Hypercholesteraemia 2010     Ruptured appendicitis 5/14    had surgery fsd     Type II or unspecified type diabetes mellitus without mention of complication, not stated as uncontrolled      Vitamin D deficiency      Past Surgical History:   Procedure Laterality Date     COLONOSCOPY N/A 11/18/2014    Procedure: COLONOSCOPY;  Surgeon: Yoni Carvajal MD;  Location:  GI     fibroid surgery  2000     HYSTERECTOMY, PAP NO LONGER INDICATED  2006    estrada/bso due to pain and bleeding     LAPAROSCOPIC APPENDECTOMY  5/19/2014    Procedure: LAPAROSCOPIC APPENDECTOMY;  Surgeon: Will Barth MD;  Location:  [facility]     Social History     Social History     Marital status:      Spouse name: N/A     Number of children: 1     Years of education: N/A     Occupational History     work dietary, 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[employer]     Social History Main Topics     Smoking status: Never Smoker     Smokeless tobacco: Never Used     Alcohol use No     Drug use: No     Sexual activity: Not Currently     Other Topics Concern     Not on file     Social History Narrative     Current Outpatient Prescriptions   Medication Sig Dispense Refill     guaiFENesin-codeine (ROBITUSSIN AC) 100-10 MG/5ML SOLN solution Take 5-10 mLs by mouth every 6 hours as needed for cough 120 mL 0     omega 3 1000 MG CAPS Take 1 g by mouth daily 90 capsule      lisinopril (PRINIVIL/ZESTRIL) 20 MG tablet TAKE 1 TABLET (20 MG) BY  "MOUTH DAILY 90 tablet 3     simvastatin (ZOCOR) 40 MG tablet Take 1 tablet (40 mg) by mouth At Bedtime 90 tablet 3     hydrochlorothiazide 12.5 MG TABS tablet Take 1 tablet (12.5 mg) by mouth daily 90 tablet 3     Cholecalciferol (VITAMIN D) 2000 UNITS tablet TAKE ONE TABLET BY MOUTH ONE TIME DAILY 30 tablet 0     ASPIRIN NOT PRESCRIBED, INTENTIONAL, 1 each continuous prn for other Antiplatelet medication not prescribed intentionally due to Not indicated based on age (Patient not taking: Reported on 4/17/2018) 1 each 0     No Known Allergies  FAMILY HISTORY NOTED AND REVIEWED    REVIEW OF SYSTEMS: above    PHYSICAL EXAM    /84  Pulse 79  Temp 97.8  F (36.6  C) (Oral)  Ht 4' 10\" (1.473 m)  Wt 147 lb (66.7 kg)  SpO2 99%  Breastfeeding? No  BMI 30.72 kg/m2    Patient appears non toxic  Tympanic membranes and canals: within normal limits bilaterally.   Mouth: Posterior pharynx, mucous membranes and tongue exam within normal limits.  Neck: supple, no nuchal rigidity or masses.  No anterior or posterior cervical adenopathy.    Lungs: clear, normal flow and effort.  cv reglar rate and rhythm, no jvp    ASSESSMENT:  Cough and uri, most c/w viral process, doubt pneumonia, chf, etc      PLAN:  Anibal sung  Call if not resolving next few days or worsens    Yoni Baxter M.D.        "

## 2018-08-13 ENCOUNTER — OFFICE VISIT (OUTPATIENT)
Dept: FAMILY MEDICINE | Facility: CLINIC | Age: 63
End: 2018-08-13
Payer: COMMERCIAL

## 2018-08-13 ENCOUNTER — HOSPITAL ENCOUNTER (OUTPATIENT)
Dept: MAMMOGRAPHY | Facility: CLINIC | Age: 63
Discharge: HOME | End: 2018-08-13
Attending: INTERNAL MEDICINE | Admitting: INTERNAL MEDICINE
Payer: COMMERCIAL

## 2018-08-13 VITALS
TEMPERATURE: 97.6 F | HEART RATE: 57 BPM | DIASTOLIC BLOOD PRESSURE: 88 MMHG | BODY MASS INDEX: 30 KG/M2 | OXYGEN SATURATION: 97 % | HEIGHT: 58 IN | SYSTOLIC BLOOD PRESSURE: 136 MMHG | WEIGHT: 142.9 LBS

## 2018-08-13 DIAGNOSIS — E55.9 VITAMIN D DEFICIENCY: ICD-10-CM

## 2018-08-13 DIAGNOSIS — Z12.31 VISIT FOR SCREENING MAMMOGRAM: ICD-10-CM

## 2018-08-13 DIAGNOSIS — E66.9 NON MORBID OBESITY, UNSPECIFIED OBESITY TYPE: ICD-10-CM

## 2018-08-13 DIAGNOSIS — E11.9 TYPE 2 DIABETES MELLITUS WITHOUT COMPLICATION, WITHOUT LONG-TERM CURRENT USE OF INSULIN (H): ICD-10-CM

## 2018-08-13 DIAGNOSIS — Z00.00 ROUTINE GENERAL MEDICAL EXAMINATION AT A HEALTH CARE FACILITY: Primary | ICD-10-CM

## 2018-08-13 DIAGNOSIS — I10 BENIGN ESSENTIAL HYPERTENSION: ICD-10-CM

## 2018-08-13 DIAGNOSIS — K21.9 GASTROESOPHAGEAL REFLUX DISEASE WITHOUT ESOPHAGITIS: ICD-10-CM

## 2018-08-13 DIAGNOSIS — E78.5 HYPERLIPIDEMIA LDL GOAL <130: ICD-10-CM

## 2018-08-13 LAB
ALBUMIN SERPL-MCNC: 3.5 G/DL (ref 3.4–5)
ALP SERPL-CCNC: 85 U/L (ref 40–150)
ALT SERPL W P-5'-P-CCNC: 20 U/L (ref 0–50)
ANION GAP SERPL CALCULATED.3IONS-SCNC: 10 MMOL/L (ref 3–14)
AST SERPL W P-5'-P-CCNC: 15 U/L (ref 0–45)
BILIRUB SERPL-MCNC: 0.4 MG/DL (ref 0.2–1.3)
BUN SERPL-MCNC: 12 MG/DL (ref 7–30)
CALCIUM SERPL-MCNC: 8.8 MG/DL (ref 8.5–10.1)
CHLORIDE SERPL-SCNC: 107 MMOL/L (ref 94–109)
CHOLEST SERPL-MCNC: 184 MG/DL
CO2 SERPL-SCNC: 24 MMOL/L (ref 20–32)
CREAT SERPL-MCNC: 0.56 MG/DL (ref 0.52–1.04)
CREAT UR-MCNC: 140 MG/DL
DEPRECATED CALCIDIOL+CALCIFEROL SERPL-MC: 33 UG/L (ref 20–75)
ERYTHROCYTE [DISTWIDTH] IN BLOOD BY AUTOMATED COUNT: 14.4 % (ref 10–15)
GFR SERPL CREATININE-BSD FRML MDRD: >90 ML/MIN/1.7M2
GLUCOSE SERPL-MCNC: 90 MG/DL (ref 70–99)
HBA1C MFR BLD: 6 % (ref 0–5.6)
HCT VFR BLD AUTO: 38.9 % (ref 35–47)
HDLC SERPL-MCNC: 48 MG/DL
HGB BLD-MCNC: 11.9 G/DL (ref 11.7–15.7)
HIV 1+2 AB+HIV1 P24 AG SERPL QL IA: NONREACTIVE
LDLC SERPL CALC-MCNC: 106 MG/DL
MCH RBC QN AUTO: 27.4 PG (ref 26.5–33)
MCHC RBC AUTO-ENTMCNC: 30.6 G/DL (ref 31.5–36.5)
MCV RBC AUTO: 90 FL (ref 78–100)
MICROALBUMIN UR-MCNC: 10 MG/L
MICROALBUMIN/CREAT UR: 7.09 MG/G CR (ref 0–25)
NONHDLC SERPL-MCNC: 136 MG/DL
PLATELET # BLD AUTO: 195 10E9/L (ref 150–450)
POTASSIUM SERPL-SCNC: 3.8 MMOL/L (ref 3.4–5.3)
PROT SERPL-MCNC: 7.4 G/DL (ref 6.8–8.8)
RBC # BLD AUTO: 4.34 10E12/L (ref 3.8–5.2)
SODIUM SERPL-SCNC: 141 MMOL/L (ref 133–144)
TRIGL SERPL-MCNC: 151 MG/DL
WBC # BLD AUTO: 6.8 10E9/L (ref 4–11)

## 2018-08-13 PROCEDURE — 80061 LIPID PANEL: CPT | Performed by: INTERNAL MEDICINE

## 2018-08-13 PROCEDURE — 36415 COLL VENOUS BLD VENIPUNCTURE: CPT | Performed by: INTERNAL MEDICINE

## 2018-08-13 PROCEDURE — 99207 C FOOT EXAM  NO CHARGE: CPT | Mod: 25 | Performed by: INTERNAL MEDICINE

## 2018-08-13 PROCEDURE — 77067 SCR MAMMO BI INCL CAD: CPT

## 2018-08-13 PROCEDURE — 85027 COMPLETE CBC AUTOMATED: CPT | Performed by: INTERNAL MEDICINE

## 2018-08-13 PROCEDURE — 87389 HIV-1 AG W/HIV-1&-2 AB AG IA: CPT | Performed by: INTERNAL MEDICINE

## 2018-08-13 PROCEDURE — 80053 COMPREHEN METABOLIC PANEL: CPT | Performed by: INTERNAL MEDICINE

## 2018-08-13 PROCEDURE — 82306 VITAMIN D 25 HYDROXY: CPT | Performed by: INTERNAL MEDICINE

## 2018-08-13 PROCEDURE — 99396 PREV VISIT EST AGE 40-64: CPT | Performed by: INTERNAL MEDICINE

## 2018-08-13 PROCEDURE — 83036 HEMOGLOBIN GLYCOSYLATED A1C: CPT | Performed by: INTERNAL MEDICINE

## 2018-08-13 PROCEDURE — 82043 UR ALBUMIN QUANTITATIVE: CPT | Performed by: INTERNAL MEDICINE

## 2018-08-13 RX ORDER — HYDROCHLOROTHIAZIDE 12.5 MG/1
12.5 TABLET ORAL DAILY
Qty: 90 TABLET | Refills: 3 | Status: SHIPPED | OUTPATIENT
Start: 2018-08-13 | End: 2019-03-07

## 2018-08-13 RX ORDER — SIMVASTATIN 40 MG
40 TABLET ORAL AT BEDTIME
Qty: 90 TABLET | Refills: 3 | Status: SHIPPED | OUTPATIENT
Start: 2018-08-13 | End: 2019-03-07

## 2018-08-13 RX ORDER — IRBESARTAN 150 MG/1
150 TABLET ORAL DAILY
Qty: 90 TABLET | Refills: 3 | Status: SHIPPED | OUTPATIENT
Start: 2018-08-13 | End: 2018-11-06

## 2018-08-13 NOTE — PROGRESS NOTES
SUBJECTIVE:   CC: Teresa Herron is an 63 year old woman who presents for preventive health visit.     The patient is doing well, leaving for a trip to her home in [place] soon.  She is active, works out mostly by staying busy, up to date eye exam, no sores on feet.  Does not check sugars and not on diabetes meds.      She has at night only dry throat, itches, no pain, lthen fine in day, no pnd, no sinus c/o, no f,c,s.  No other c/o on review of systems.      Healthy Habits:    Do you get at least three servings of calcium containing foods daily (dairy, green leafy vegetables, etc.)? yes    Amount of exercise or daily activities, outside of work: 0 day(s) per week    Problems taking medications regularly No    Medication side effects: No    Have you had an eye exam in the past two years? Thinks so    Do you see a dentist twice per year? yes    Do you have sleep apnea, excessive snoring or daytime drowsiness?no          Today's PHQ-2 Score:   PHQ-2 ( 1999 Pfizer) 4/17/2018 12/11/2017   Q1: Little interest or pleasure in doing things 0 0   Q2: Feeling down, depressed or hopeless 0 0   PHQ-2 Score 0 0       Abuse: Current or Past(Physical, Sexual or Emotional)- No  Do you feel safe in your environment - Yes    Social History   Substance Use Topics     Smoking status: Never Smoker     Smokeless tobacco: Never Used     Alcohol use No     If you drink alcohol do you typically have >3 drinks per day or >7 drinks per week? No                Past Medical History:      Past Medical History:   Diagnosis Date     Chest pain 4/15    nl est echo     Dizziness years     GERD (gastroesophageal reflux disease)      H/O colonoscopy 2014    tics, tortuous, fu 5 years     HTN (hypertension) 1988     Hypercholesteraemia 2010     Ruptured appendicitis 5/14    had surgery fsd     Type II or unspecified type diabetes mellitus without mention of complication, not stated as uncontrolled     not on meds as of 2018     Vitamin D deficiency               Past Surgical History:      Past Surgical History:   Procedure Laterality Date     COLONOSCOPY N/A 11/18/2014    Procedure: COLONOSCOPY;  Surgeon: Yoni Carvajal MD;  Location:  GI     fibroid surgery  2000     HYSTERECTOMY, PAP NO LONGER INDICATED  2006    estrada/bso due to pain and bleeding     LAPAROSCOPIC APPENDECTOMY  5/19/2014    Procedure: LAPAROSCOPIC APPENDECTOMY;  Surgeon: Will Barth MD;  Location: [facility]             Social History:     Social History     Social History     Marital status:      Spouse name: N/A     Number of children: 1     Years of education: N/A     Occupational History     work dietary, in [employer]     Social History Main Topics     Smoking status: Never Smoker     Smokeless tobacco: Never Used     Alcohol use No     Drug use: No     Sexual activity: Not Currently     Other Topics Concern     Not on file     Social History Narrative             Family History:   reviewed         Allergies:   No Known Allergies          Medications:     Current Outpatient Prescriptions   Medication Sig Dispense Refill     aspirin 81 MG EC tablet Take 1 tablet (81 mg) by mouth daily 90 tablet 3     Cholecalciferol (VITAMIN D) 2000 UNITS tablet TAKE ONE TABLET BY MOUTH ONE TIME DAILY 30 tablet 0     hydrochlorothiazide 12.5 MG TABS tablet Take 1 tablet (12.5 mg) by mouth daily 90 tablet 3     irbesartan (AVAPRO) 150 MG tablet Take 1 tablet (150 mg) by mouth daily 90 tablet 3     omega 3 1000 MG CAPS Take 1 g by mouth daily 90 capsule      simvastatin (ZOCOR) 40 MG tablet Take 1 tablet (40 mg) by mouth At Bedtime 90 tablet 3     [DISCONTINUED] hydrochlorothiazide 12.5 MG TABS tablet Take 1 tablet (12.5 mg) by mouth daily 90 tablet 3     [DISCONTINUED] simvastatin (ZOCOR) 40 MG tablet Take 1 tablet (40 mg) by mouth At Bedtime 90 tablet 3               Review of Systems:   The 10 point Review of Systems is negative other than noted in the HPI           " Physical Exam:   Blood pressure 136/88, pulse 57, temperature 97.6  F (36.4  C), temperature source Oral, height 4' 10\" (1.473 m), weight 142 lb 14.4 oz (64.8 kg), SpO2 97 %, not currently breastfeeding.    Exam:  Constitutional: healthy appearing, alert and in no distress  Heent: Normocephalic. Head without obvious masses or lesions. PERRLDC, EOMI. Mouth exam within normal limits: tongue, mucous membranes, posterior pharynx all normal, no lesions or abnormalities seen.  Tm's and canals within normal limits bilaterally. Neck supple, no nuchal rigidity or masses. No supraclavicular, or cervical adenopathy. Thyroid symmetric, no masses.  Cardiovascular: Regular rate and rhythm, no murmer, rub or gallops.  JVP not elevated, no edema.  Carotids within normal limits bilaterally, no bruits.  Respiratory: Normal respiratory effort.  Lungs clear, normal flow, no wheezing or crackles.  Breasts: Normal bilaterally.  No masses or lesions.  Nipples within normal limits.  No axillary lesions or nodes.  My M.A. Was present during this part of the examination.  Gastrointestinal: Normal active bowel sounds.   Soft, not tender, no masses, guarding or rebound.  No hepatosplenomegaly.   Musculoskeletal: extremities normal, no gross deformities noted.  Skin: no suspicious lesions or rashes   Neurologic: Mental status within normal limits.  Speech fluent.  No gross motor abnormalities and gait intact.  Psychiatric: mentation appears normal and affect normal.         Data:   Labs sent        Assessment:   1. Normal complete physical exam  2. Diabetes, no c/o and not on meds, feet exam within normal limits today  3. Obesity, weight loss  4. Hypertension, controlled  5. Throat issue, suspect due to acei  6. Elevated cholesterol, not taking zocor reg  7. Gerd, no issues  8. Vit d defic, follow up labs  9. hcm         Plan:   Exercise, diet and weight loss  Mammogram today  Up to date immunizations, to check on shingrix coverage  Letter " with labs  Stop acei and add avapro 150mg,l if throat c/o not gone soon call      Yoni Baxter M.D.

## 2018-08-13 NOTE — PATIENT INSTRUCTIONS
I would recommend getting the new shingles shot called shingrix, but I would do it at your pharmacy as they can check with the insurance company to see if it is paid for.    Stop the lisinopril and start the new blood pressure medicine called irbesartan and if your throat symptoms are not gone in the next 2 weeks let me know    Get the mammogram    Yoni Baxter M.D.            Preventive Health Recommendations  Female Ages 50 - 64    Yearly exam: See your health care provider every year in order to  o Review health changes.   o Discuss preventive care.    o Review your medicines if your doctor has prescribed any.      Get a Pap test every three years (unless you have an abnormal result and your provider advises testing more often).    If you get Pap tests with HPV test, you only need to test every 5 years, unless you have an abnormal result.     You do not need a Pap test if your uterus was removed (hysterectomy) and you have not had cancer.    You should be tested each year for STDs (sexually transmitted diseases) if you're at risk.     Have a mammogram every 1 to 2 years.    Have a colonoscopy at age 50, or have a yearly FIT test (stool test). These exams screen for colon cancer.      Have a cholesterol test every 5 years, or more often if advised.    Have a diabetes test (fasting glucose) every three years. If you are at risk for diabetes, you should have this test more often.     If you are at risk for osteoporosis (brittle bone disease), think about having a bone density scan (DEXA).    Shots: Get a flu shot each year. Get a tetanus shot every 10 years.    Nutrition:     Eat at least 5 servings of fruits and vegetables each day.    Eat whole-grain bread, whole-wheat pasta and brown rice instead of white grains and rice.    Get adequate Calcium and Vitamin D.     Lifestyle    Exercise at least 150 minutes a week (30 minutes a day, 5 days a week). This will help you control your weight and prevent  disease.    Limit alcohol to one drink per day.    No smoking.     Wear sunscreen to prevent skin cancer.     See your dentist every six months for an exam and cleaning.    See your eye doctor every 1 to 2 years.

## 2018-08-13 NOTE — LETTER
[facility]  [address] [facility]  [address]  [place]  [address]  Tel: [phone]    August 14, 2018    Teresa Herron  [address]   [facility] [address]        Dear Ms. Herron,    It was a pleasure seeing you.  I wanted to get back to you with your test results.  I have enclosed a copy for your records.     Your labs look very good.  Your diabetes test or a1c is improved, your vitamin D level is normal, your urine is clear and the hiv test is negative.  Your complete blood count, blood salts, kidney tests, liver tests and proteins are all normal.  Your cholesterol is better then the last time but still a bit too high.  I would recommend that you take the cholesterol pill daily.     I am happy to bring you this overall excellent report.  I believe your health is very good.  Have a safe trip.  If you have any questions please call me.       Sincerely,    Yoni Baxter MD/faheem    Results for orders placed or performed in visit on 08/13/18   CBC with platelets   Result Value Ref Range    WBC 6.8 4.0 - 11.0 10e9/L    RBC Count 4.34 3.8 - 5.2 10e12/L    Hemoglobin 11.9 11.7 - 15.7 g/dL    Hematocrit 38.9 35.0 - 47.0 %    MCV 90 78 - 100 fl    MCH 27.4 26.5 - 33.0 pg    MCHC 30.6 (L) 31.5 - 36.5 g/dL    RDW 14.4 10.0 - 15.0 %    Platelet Count 195 150 - 450 10e9/L   Comprehensive metabolic panel   Result Value Ref Range    Sodium 141 133 - 144 mmol/L    Potassium 3.8 3.4 - 5.3 mmol/L    Chloride 107 94 - 109 mmol/L    Carbon Dioxide 24 20 - 32 mmol/L    Anion Gap 10 3 - 14 mmol/L    Glucose 90 70 - 99 mg/dL    Urea Nitrogen 12 7 - 30 mg/dL    Creatinine 0.56 0.52 - 1.04 mg/dL    GFR Estimate >90 >60 mL/min/1.7m2    GFR Estimate If Black >90 >60 mL/min/1.7m2    Calcium 8.8 8.5 - 10.1 mg/dL    Bilirubin Total 0.4 0.2 - 1.3 mg/dL    Albumin 3.5 3.4 - 5.0 g/dL    Protein Total 7.4 6.8 - 8.8 g/dL    Alkaline Phosphatase 85 40 - 150 U/L    ALT 20 0 - 50 U/L    AST 15 0 - 45 U/L   Lipid panel  reflex to direct LDL Non-fasting   Result Value Ref Range    Cholesterol 184 <200 mg/dL    Triglycerides 151 (H) <150 mg/dL    HDL Cholesterol 48 (L) >49 mg/dL    LDL Cholesterol Calculated 106 (H) <100 mg/dL    Non HDL Cholesterol 136 (H) <130 mg/dL   Hemoglobin A1c   Result Value Ref Range    Hemoglobin A1C 6.0 (H) 0 - 5.6 %   Albumin Random Urine Quantitative with Creat Ratio   Result Value Ref Range    Creatinine Urine 140 mg/dL    Albumin Urine mg/L 10 mg/L    Albumin Urine mg/g Cr 7.09 0 - 25 mg/g Cr   Vitamin D Deficiency   Result Value Ref Range    Vitamin D Deficiency screening 33 20 - 75 ug/L   HIV Antigen Antibody Combo   Result Value Ref Range    HIV Antigen Antibody Combo Nonreactive NR^Nonreactive               Enclosure: Lab Results

## 2018-08-13 NOTE — MR AVS SNAPSHOT
After Visit Summary   8/13/2018    Teresa Herron    MRN: [id number]           Patient Information     Date Of Birth          1955        Visit Information        Provider Department      8/13/2018 11:30 AM Yoni Baxter MD [facility]        Today's Diagnoses     Routine general medical examination at a health care facility    -  1    Type 2 diabetes mellitus without complication, without long-term current use of insulin (H)        Hyperlipidemia LDL goal <130        Benign essential hypertension        Non morbid obesity, unspecified obesity type        Vitamin D deficiency        Gastroesophageal reflux disease without esophagitis          Care Instructions    I would recommend getting the new shingles shot called shingrix, but I would do it at your pharmacy as they can check with the insurance company to see if it is paid for.    Stop the lisinopril and start the new blood pressure medicine called irbesartan and if your throat symptoms are not gone in the next 2 weeks let me know    Get the mammogram    Yoni Baxter M.D.            Preventive Health Recommendations  Female Ages 50 - 64    Yearly exam: See your health care provider every year in order to  o Review health changes.   o Discuss preventive care.    o Review your medicines if your doctor has prescribed any.      Get a Pap test every three years (unless you have an abnormal result and your provider advises testing more often).    If you get Pap tests with HPV test, you only need to test every 5 years, unless you have an abnormal result.     You do not need a Pap test if your uterus was removed (hysterectomy) and you have not had cancer.    You should be tested each year for STDs (sexually transmitted diseases) if you're at risk.     Have a mammogram every 1 to 2 years.    Have a colonoscopy at age 50, or have a yearly FIT test (stool test). These exams screen for colon cancer.      Have a cholesterol test every  5 years, or more often if advised.    Have a diabetes test (fasting glucose) every three years. If you are at risk for diabetes, you should have this test more often.     If you are at risk for osteoporosis (brittle bone disease), think about having a bone density scan (DEXA).    Shots: Get a flu shot each year. Get a tetanus shot every 10 years.    Nutrition:     Eat at least 5 servings of fruits and vegetables each day.    Eat whole-grain bread, whole-wheat pasta and brown rice instead of white grains and rice.    Get adequate Calcium and Vitamin D.     Lifestyle    Exercise at least 150 minutes a week (30 minutes a day, 5 days a week). This will help you control your weight and prevent disease.    Limit alcohol to one drink per day.    No smoking.     Wear sunscreen to prevent skin cancer.     See your dentist every six months for an exam and cleaning.    See your eye doctor every 1 to 2 years.            Follow-ups after your visit        Your next 10 appointments already scheduled     Aug 13, 2018 11:30 AM CDT   [facility]    [address] [id number] [phone]           Bring a current list of meds and any records pertaining to this visit. For Physicals, please bring immunization records and any forms needing to be filled out. Please arrive 10 minutes early to complete paperwork.              Who to contact     If you have questions or need follow up information about today's clinic visit or your schedule please contact [facility] directly at [phone].  Normal or non-critical lab and imaging results will be communicated to you by MyChart, letter or phone within 4 business days after the clinic has received the results. If you do not hear from us within 7 days, please contact the clinic through MyChart or phone. If you have a critical or abnormal lab result, we will notify you by phone as soon  "as possible.  Submit refill requests through [employer] or call your pharmacy and they will forward the refill request to us. Please allow 3 business days for your refill to be completed.          Additional Information About Your Visit        [employer] Information     [employer] lets you send messages to your doctor, view your test results, renew your prescriptions, schedule appointments and more. To sign up, go to [email or web address] . Click on \"Log in\" on the left side of the screen, which will take you to the Welcome page. Then click on \"Sign up Now\" on the right side of the page.     You will be asked to enter the access code listed below, as well as some personal information. Please follow the directions to create your username and password.     Your access code is: [email or web address]  Expires: 2018 11:00 AM     Your access code will  in 90 days. If you need help or a new code, please call your [facility] clinic or [phone].        Care EveryWhere ID     This is your Care EveryWhere ID. This could be used by other organizations to access your [facility] medical records  [id number]        Your Vitals Were     Pulse Temperature Height Pulse Oximetry Breastfeeding? BMI (Body Mass Index)    57 97.6  F (36.4  C) (Oral) 4' 10\" (1.473 m) 97% No 29.87 kg/m2       Blood Pressure from Last 3 Encounters:   18 136/88   18 130/84   17 131/77    Weight from Last 3 Encounters:   18 142 lb 14.4 oz (64.8 kg)   18 147 lb (66.7 kg)   17 147 lb 3.2 oz (66.8 kg)              We Performed the Following     Albumin Random Urine Quantitative with Creat Ratio     C FOOT EXAM  NO CHARGE     CBC with platelets     Comprehensive metabolic panel     Hemoglobin A1c     Lipid panel reflex to direct LDL Non-fasting     Vitamin D Deficiency          Today's Medication Changes          These changes are accurate as of 18 11:00 AM.  If you have any questions, ask your nurse or doctor.             "   Start taking these medicines.        Dose/Directions    aspirin 81 MG EC tablet   Used for:  Type 2 diabetes mellitus without complication, without long-term current use of insulin (H)   Started by:  Yoni Baxter MD        Dose:  81 mg   Take 1 tablet (81 mg) by mouth daily   Quantity:  90 tablet   Refills:  3       irbesartan 150 MG tablet   Commonly known as:  AVAPRO   Used for:  Benign essential hypertension   Started by:  Yoni Baxter MD        Dose:  150 mg   Take 1 tablet (150 mg) by mouth daily   Quantity:  90 tablet   Refills:  3         Stop taking these medicines if you haven't already. Please contact your care team if you have questions.     ASPIRIN NOT PRESCRIBED   Commonly known as:  INTENTIONAL   Stopped by:  Yoni Baxter MD           guaiFENesin-codeine 100-10 MG/5ML Soln solution   Commonly known as:  ROBITUSSIN AC   Stopped by:  Yoni Baxter MD           lisinopril 20 MG tablet   Commonly known as:  PRINIVIL/ZESTRIL   Stopped by:  Yoni Baxter MD                Where to get your medicines      These medications were sent to [facility] [address] [id number]  [address] [facility] [address]     Phone:  [phone]     aspirin 81 MG EC tablet    hydrochlorothiazide 12.5 MG Tabs tablet    irbesartan 150 MG tablet    simvastatin 40 MG tablet                Primary Care Provider Office Phone # Fax #    Yoni Baxter [phone] [address] [facility] [address]  [facility] [address]        Equal Access to Services     [facility] CenterDOUGLAS : Hadii aad ku hadasho Soomaali, waaxda luqadaha, qaybta kaalmada adeegyada, waxay idiin hayaan socorro nova . So [facility] [phone].    ATENCIÓN: Si habla español, tiene a farias disposición servicios gratuitos de asistencia lingüística. Llame al [phone].    We comply with applicable federal civil rights laws and [place] laws. We do not discriminate on the basis of race, color,  national origin, age, disability, sex, sexual orientation, or gender identity.            Thank you!     Thank you for choosing [facility]  for your care. Our goal is always to provide you with excellent care. Hearing back from our patients is one way we can continue to improve our services. Please take a few minutes to complete the written survey that you may receive in the mail after your visit with us. Thank you!             Your Updated Medication List - Protect others around you: Learn how to safely use, store and throw away your medicines at www.disposemymeds.org.          This list is accurate as of 8/13/18 11:00 AM.  Always use your most recent med list.                   Brand Name Dispense Instructions for use Diagnosis    aspirin 81 MG EC tablet     90 tablet    Take 1 tablet (81 mg) by mouth daily    Type 2 diabetes mellitus without complication, without long-term current use of insulin (H)       hydrochlorothiazide 12.5 MG Tabs tablet     90 tablet    Take 1 tablet (12.5 mg) by mouth daily    Benign essential hypertension       irbesartan 150 MG tablet    AVAPRO    90 tablet    Take 1 tablet (150 mg) by mouth daily    Benign essential hypertension       omega 3 1000 MG Caps     90 capsule    Take 1 g by mouth daily        simvastatin 40 MG tablet    ZOCOR    90 tablet    Take 1 tablet (40 mg) by mouth At Bedtime    Hyperlipidemia LDL goal <130       vitamin D 2000 units tablet     30 tablet    TAKE ONE TABLET BY MOUTH ONE TIME DAILY    Vitamin D deficiency

## 2018-08-14 NOTE — PROGRESS NOTES
It was a pleasure seeing you.  I wanted to get back to you with your test results.  I have enclosed a copy for your records.    Your labs look very good.  Your diabetes test or a1c is improved, your vitamin D level is normal, your urine is clear and the hiv test is negative.  Your complete blood count, blood salts, kidney tests, liver tests and proteins are all normal.  Your cholesterol is better then the last time but still a bit too high.  I would recommend that you take the cholesterol pill daily.    I am happy to bring you this overall excellent report.  I believe your health is very good.  Have a safe trip.  If you have any questions please call me.

## 2018-10-30 DIAGNOSIS — I10 BENIGN ESSENTIAL HYPERTENSION: ICD-10-CM

## 2018-10-30 NOTE — TELEPHONE ENCOUNTER
Lisinopril not on current med list    Patient Instructions  Encounter Date: 8/13/2018       I would recommend getting the new shingles shot called shingrix, but I would do it at your pharmacy as they can check with the insurance company to see if it is paid for.     Stop the lisinopril and start the new blood pressure medicine called irbesartan and if your throat symptoms are not gone in the next 2 weeks let me know     Get the mammogram     Yoni Baxter M.D.

## 2018-11-01 NOTE — TELEPHONE ENCOUNTER
Per note on refill request from pharmacy: PT HAVING SIDE EFFECTS WITH MED THAT REPLACES LISINOPRIL AND WOULD LIKE TO TRY LISINOPRIL AGAIN. PLEASE SEND NEW RX IF OK. THANK YOU!    LVM asking patient to call triage back     Alisha MEDINA RN

## 2018-11-01 NOTE — TELEPHONE ENCOUNTER
"Last Written Prescription Date:  9/23/13  Last Fill Quantity: ?,  # refills: ?   Last office visit: 8/13/2018 with prescribing provider:  By [employer].   Future Office Visit:        Requested Prescriptions   Pending Prescriptions Disp Refills     lisinopril (PRINIVIL/ZESTRIL) 20 MG tablet [Pharmacy Med Name: LISINOPRIL 20 MG TABLET] 90 tablet 1     Sig: TAKE 1 TABLET (20 MG) BY MOUTH DAILY    ACE Inhibitors (Including Combos) Protocol Passed    11/1/2018 11:40 AM       Passed - Blood pressure under 140/90 in past 12 months    BP Readings from Last 3 Encounters:   08/13/18 136/88   04/17/18 130/84   12/11/17 131/77                Passed - Recent (12 mo) or future (30 days) visit within the authorizing provider's specialty    Patient had office visit in the last 12 months or has a visit in the next 30 days with authorizing provider or within the authorizing provider's specialty.  See \"Patient Info\" tab in inbasket, or \"Choose Columns\" in Meds & Orders section of the refill encounter.             Passed - Patient is age 18 or older       Passed - No active pregnancy on record       Passed - Normal serum creatinine on file in past 12 months    Recent Labs   Lab Test  08/13/18   1117   CR  0.56            Passed - Normal serum potassium on file in past 12 months    Recent Labs   Lab Test  08/13/18   1117   POTASSIUM  3.8            Passed - No positive pregnancy test in past 12 months          "

## 2018-11-05 NOTE — TELEPHONE ENCOUNTER
To PCP:     Medication is not active on Med List. Appears med was discontinued at Physical.     Please review and authorize if appropriate,     Thank you,   Mera MEYER RN

## 2018-11-05 NOTE — TELEPHONE ENCOUNTER
Please call patient, see last complete physical exam, I thought we stopped this and went to [facility]?    Yoni Baxter M.D.

## 2018-11-06 RX ORDER — LOSARTAN POTASSIUM 100 MG/1
100 TABLET ORAL DAILY
Qty: 90 TABLET | Refills: 3 | Status: SHIPPED | OUTPATIENT
Start: 2018-11-06 | End: 2019-03-07

## 2018-11-06 RX ORDER — LISINOPRIL 20 MG/1
TABLET ORAL
Qty: 90 TABLET | Refills: 1 | OUTPATIENT
Start: 2018-11-06

## 2018-11-06 NOTE — TELEPHONE ENCOUNTER
Tried calling patient at home phone; Annie not on C2C answered - states patient not home, try cell otherwise will be home around 2:15pm     Left message asking patient to call back (moblile number)     Alisha MEDINA RN

## 2018-11-06 NOTE — TELEPHONE ENCOUNTER
Spoke with patient and notified of below message from PCP     Pt agreeable to this plan    Alisha MEDINA RN

## 2018-11-06 NOTE — TELEPHONE ENCOUNTER
"Pt called back.    Pt states that she stopped Avapro about 3 weeks ago due to headache and fatigue. \" I just did not feel well on that medication\" Pt reports that she had leftover lisinopril which she started taking but then the cough returned at night. Pt states that she did not have a cough while on Avapro but does not want to take that again. Pt reports that she has been without blood pressure medicine for about 1 week. Pt is c/o headache.    Pt needs a new blood pressure medication without the current side effects of Avapro and lisinopril      Pt is at work now and would prefer a call back tomorrow 11/7/18 before 10 am with 's advisement. Please call ([phone]_    Pt uses [facility] in [facility]  "

## 2019-03-07 ENCOUNTER — OFFICE VISIT (OUTPATIENT)
Dept: FAMILY MEDICINE | Facility: CLINIC | Age: 64
End: 2019-03-07
Payer: COMMERCIAL

## 2019-03-07 VITALS
HEIGHT: 56 IN | HEART RATE: 64 BPM | SYSTOLIC BLOOD PRESSURE: 138 MMHG | DIASTOLIC BLOOD PRESSURE: 86 MMHG | WEIGHT: 142 LBS | OXYGEN SATURATION: 99 % | BODY MASS INDEX: 31.94 KG/M2 | TEMPERATURE: 97.2 F

## 2019-03-07 DIAGNOSIS — E55.9 VITAMIN D DEFICIENCY: ICD-10-CM

## 2019-03-07 DIAGNOSIS — M17.0 PRIMARY OSTEOARTHRITIS OF BOTH KNEES: ICD-10-CM

## 2019-03-07 DIAGNOSIS — E78.5 HYPERLIPIDEMIA LDL GOAL <130: ICD-10-CM

## 2019-03-07 DIAGNOSIS — E66.9 NON MORBID OBESITY, UNSPECIFIED OBESITY TYPE: ICD-10-CM

## 2019-03-07 DIAGNOSIS — K21.9 GASTROESOPHAGEAL REFLUX DISEASE WITHOUT ESOPHAGITIS: ICD-10-CM

## 2019-03-07 DIAGNOSIS — R42 VERTIGO: ICD-10-CM

## 2019-03-07 DIAGNOSIS — E11.9 TYPE 2 DIABETES MELLITUS WITHOUT COMPLICATION, WITHOUT LONG-TERM CURRENT USE OF INSULIN (H): ICD-10-CM

## 2019-03-07 DIAGNOSIS — I10 BENIGN ESSENTIAL HYPERTENSION: ICD-10-CM

## 2019-03-07 DIAGNOSIS — Z00.00 ROUTINE GENERAL MEDICAL EXAMINATION AT A HEALTH CARE FACILITY: Primary | ICD-10-CM

## 2019-03-07 DIAGNOSIS — R21 RASH AND NONSPECIFIC SKIN ERUPTION: ICD-10-CM

## 2019-03-07 LAB
ERYTHROCYTE [DISTWIDTH] IN BLOOD BY AUTOMATED COUNT: 14 % (ref 10–15)
HBA1C MFR BLD: 6.1 % (ref 0–5.6)
HCT VFR BLD AUTO: 38.9 % (ref 35–47)
HGB BLD-MCNC: 12.1 G/DL (ref 11.7–15.7)
MCH RBC QN AUTO: 28.1 PG (ref 26.5–33)
MCHC RBC AUTO-ENTMCNC: 31.1 G/DL (ref 31.5–36.5)
MCV RBC AUTO: 91 FL (ref 78–100)
PLATELET # BLD AUTO: 262 10E9/L (ref 150–450)
RBC # BLD AUTO: 4.3 10E12/L (ref 3.8–5.2)
WBC # BLD AUTO: 7.3 10E9/L (ref 4–11)

## 2019-03-07 PROCEDURE — 85027 COMPLETE CBC AUTOMATED: CPT | Performed by: INTERNAL MEDICINE

## 2019-03-07 PROCEDURE — 80053 COMPREHEN METABOLIC PANEL: CPT | Performed by: INTERNAL MEDICINE

## 2019-03-07 PROCEDURE — 99207 C FOOT EXAM  NO CHARGE: CPT | Mod: 25 | Performed by: INTERNAL MEDICINE

## 2019-03-07 PROCEDURE — 99396 PREV VISIT EST AGE 40-64: CPT | Performed by: INTERNAL MEDICINE

## 2019-03-07 PROCEDURE — 83036 HEMOGLOBIN GLYCOSYLATED A1C: CPT | Performed by: INTERNAL MEDICINE

## 2019-03-07 PROCEDURE — 80061 LIPID PANEL: CPT | Performed by: INTERNAL MEDICINE

## 2019-03-07 PROCEDURE — 84443 ASSAY THYROID STIM HORMONE: CPT | Performed by: INTERNAL MEDICINE

## 2019-03-07 PROCEDURE — 36415 COLL VENOUS BLD VENIPUNCTURE: CPT | Performed by: INTERNAL MEDICINE

## 2019-03-07 RX ORDER — HYDROCHLOROTHIAZIDE 12.5 MG/1
12.5 TABLET ORAL DAILY
Qty: 90 TABLET | Refills: 3 | Status: SHIPPED | OUTPATIENT
Start: 2019-03-07 | End: 2019-07-02

## 2019-03-07 RX ORDER — LOSARTAN POTASSIUM 100 MG/1
100 TABLET ORAL DAILY
Qty: 90 TABLET | Refills: 3 | Status: SHIPPED | OUTPATIENT
Start: 2019-03-07 | End: 2019-07-02

## 2019-03-07 RX ORDER — CLOTRIMAZOLE 1 %
CREAM (GRAM) TOPICAL 2 TIMES DAILY
Qty: 15 G | Refills: 1 | Status: SHIPPED | OUTPATIENT
Start: 2019-03-07 | End: 2020-09-04

## 2019-03-07 RX ORDER — SIMVASTATIN 40 MG
40 TABLET ORAL AT BEDTIME
Qty: 90 TABLET | Refills: 3 | Status: SHIPPED | OUTPATIENT
Start: 2019-03-07 | End: 2019-07-02

## 2019-03-07 ASSESSMENT — MIFFLIN-ST. JEOR: SCORE: 1067.11

## 2019-03-07 NOTE — LETTER
[place] [address] [facility]  [address]  [place]  [address]  Tel: [phone]    March 8, 2019    Teresa Herron  [address]   [facility] [address]        Dear Ms. Herron,    It was a pleasure seeing you.  I wanted to get back to you with your test results.  I have enclosed a copy for your records.    For the most part your labs look very good.  This includes your complete blood count, blood salts, kidney tests, liver tests, proteins, thyroid test, and hemoglobin A1c or diabetes test.    Your total cholesterol is too high at 205.  Your HDL or good cholesterol is very good at 52.  Your LDL or bad cholesterol is too high at 118.  These numbers are certainly much better than they were a year ago when your cholesterol was 291.  Please be sure to exercise as much as you can and eat a healthy diet to keep the numbers down.    I am happy to bring you this overall excellent report.  If you have any questions let me know    Sincerely,    Yoni Baxter MD/LAURA          Enclosure: Lab Results  Results for orders placed or performed in visit on 03/07/19   CBC with platelets   Result Value Ref Range    WBC 7.3 4.0 - 11.0 10e9/L    RBC Count 4.30 3.8 - 5.2 10e12/L    Hemoglobin 12.1 11.7 - 15.7 g/dL    Hematocrit 38.9 35.0 - 47.0 %    MCV 91 78 - 100 fl    MCH 28.1 26.5 - 33.0 pg    MCHC 31.1 (L) 31.5 - 36.5 g/dL    RDW 14.0 10.0 - 15.0 %    Platelet Count 262 150 - 450 10e9/L   Comprehensive metabolic panel   Result Value Ref Range    Sodium 140 133 - 144 mmol/L    Potassium 4.3 3.4 - 5.3 mmol/L    Chloride 106 94 - 109 mmol/L    Carbon Dioxide 31 20 - 32 mmol/L    Anion Gap 3 3 - 14 mmol/L    Glucose 90 70 - 99 mg/dL    Urea Nitrogen 12 7 - 30 mg/dL    Creatinine 0.60 0.52 - 1.04 mg/dL    GFR Estimate >90 >60 mL/min/[1.73_m2]    GFR Estimate If Black >90 >60 mL/min/[1.73_m2]    Calcium 8.8 8.5 - 10.1 mg/dL    Bilirubin Total 0.5 0.2 - 1.3 mg/dL    Albumin 3.5 3.4 - 5.0 g/dL    Protein Total  7.1 6.8 - 8.8 g/dL    Alkaline Phosphatase 76 40 - 150 U/L    ALT 16 0 - 50 U/L    AST 16 0 - 45 U/L   Lipid panel reflex to direct LDL Non-fasting   Result Value Ref Range    Cholesterol 205 (H) <200 mg/dL    Triglycerides 177 (H) <150 mg/dL    HDL Cholesterol 52 >49 mg/dL    LDL Cholesterol Calculated 118 (H) <100 mg/dL    Non HDL Cholesterol 153 (H) <130 mg/dL   TSH with free T4 reflex   Result Value Ref Range    TSH 1.12 0.40 - 4.00 mU/L   Hemoglobin A1c   Result Value Ref Range    Hemoglobin A1C 6.1 (H) 0 - 5.6 %

## 2019-03-07 NOTE — PROGRESS NOTES
SUBJECTIVE:   CC: Teresa Herron is an 63 year old woman who presents for preventive health visit.     The patient has a few issues to go over.    The patient has diabetes, not on medication.  She does not check her sugars.  She needs an eye exam.  She has no sores on her feet or toes.    Patient has osteoarthritis of her knees.  She had an injection by orthopedics in 2017 which did not help.  She wants to try something else.  She has mostly rest pain.  No swelling or claudication.    For over 10 years she has had vertigo.  This comes and goes.  Is not new or changed.    She has a skin rash on her elbows.  Is been there a long time can hurt at times.    She otherwise feels fine.  She now works at [employer] in the kitchen.  She is active but does not exercise outside of work.    Healthy Habits:    Do you get at least three servings of calcium containing foods daily (dairy, green leafy vegetables, etc.)? yes    Amount of exercise or daily activities, outside of work: none    Problems taking medications regularly No    Medication side effects: No    Have you had an eye exam in the past two years? yes    Do you see a dentist twice per year? yes    Do you have sleep apnea, excessive snoring or daytime drowsiness?no          Today's PHQ-2 Score:   PHQ-2 ( 1999 [employer]izer) 8/13/2018 4/17/2018   Q1: Little interest or pleasure in doing things 0 0   Q2: Feeling down, depressed or hopeless 0 0   PHQ-2 Score 0 0       Abuse: Current or Past(Physical, Sexual or Emotional)- NO  Do you feel safe in your environment? YES    Social History     Tobacco Use     Smoking status: Never Smoker     Smokeless tobacco: Never Used   Substance Use Topics     Alcohol use: No     Alcohol/week: 0.0 oz     If you drink alcohol do you typically have >3 drinks per day or >7 drinks per week? Not Applicable                Past Medical History:      Past Medical History:   Diagnosis Date     Arthritis of knee, degenerative     seen by   Karen 2017 and cortisone did not help     Chest pain 4/15    nl est echo     Dizziness years     GERD (gastroesophageal reflux disease)      H/O colonoscopy 2014    tics, tortuous, fu 5 years     HTN (hypertension) 1988     Hypercholesteraemia 2010     Ruptured appendicitis 5/14    had surgery fsd     Type II or unspecified type diabetes mellitus without mention of complication, not stated as uncontrolled     not on meds as of 2018     Vitamin D deficiency              Past Surgical History:      Past Surgical History:   Procedure Laterality Date     COLONOSCOPY N/A 11/18/2014    Procedure: COLONOSCOPY;  Surgeon: Yoni Carvajal MD;  Location:  GI     fibroid surgery  2000     HYSTERECTOMY, PAP NO LONGER INDICATED  2006    estrada/bso due to pain and bleeding     LAPAROSCOPIC APPENDECTOMY  5/19/2014    Procedure: LAPAROSCOPIC APPENDECTOMY;  Surgeon: Will Barth MD;  Location: [facility]             Social History:     Social History     Socioeconomic History     Marital status:      Spouse name: Not on file     Number of children: 1     Years of education: Not on file     Highest education level: Not on file   Occupational History     Occupation: work dietary, in kitchen     Employer: [employer]   Social Needs     Financial resource strain: Not on file     Food insecurity:     Worry: Not on file     Inability: Not on file     Transportation needs:     Medical: Not on file     Non-medical: Not on file   Tobacco Use     Smoking status: Never Smoker     Smokeless tobacco: Never Used   Substance and Sexual Activity     Alcohol use: No     Alcohol/week: 0.0 oz     Drug use: No     Sexual activity: Not Currently   Lifestyle     Physical activity:     Days per week: Not on file     Minutes per session: Not on file     Stress: Not on file   Relationships     Social connections:     Talks on phone: Not on file     Gets together: Not on file     Attends Gnosticist service: Not on file     Active  "member of club or organization: Not on file     Attends meetings of clubs or organizations: Not on file     Relationship status: Not on file     Intimate partner violence:     Fear of current or ex partner: Not on file     Emotionally abused: Not on file     Physically abused: Not on file     Forced sexual activity: Not on file   Other Topics Concern     Not on file   Social History Narrative     Not on file             Family History:   reviewed         Allergies:   No Known Allergies          Medications:     Current Outpatient Medications   Medication Sig Dispense Refill     aspirin 81 MG EC tablet Take 1 tablet (81 mg) by mouth daily 90 tablet 3     Cholecalciferol (VITAMIN D) 2000 UNITS tablet TAKE ONE TABLET BY MOUTH ONE TIME DAILY 30 tablet 0     clotrimazole (LOTRIMIN) 1 % external cream Apply topically 2 times daily 15 g 1     diclofenac (VOLTAREN) 1 % topical gel Place onto the skin 4 times daily Use on knees bilaterally 2 Tube 3     hydrochlorothiazide (HYDRODIURIL) 12.5 MG tablet Take 1 tablet (12.5 mg) by mouth daily 90 tablet 3     losartan (COZAAR) 100 MG tablet Take 1 tablet (100 mg) by mouth daily 90 tablet 3     omega 3 1000 MG CAPS Take 1 g by mouth daily 90 capsule      simvastatin (ZOCOR) 40 MG tablet Take 1 tablet (40 mg) by mouth At Bedtime 90 tablet 3               Review of Systems:   The 10 point Review of Systems is negative other than noted in the HPI           Physical Exam:   Blood pressure 138/86, pulse 64, temperature 97.2  F (36.2  C), temperature source Tympanic, height 1.422 m (4' 8\"), weight 64.4 kg (142 lb), SpO2 99 %, not currently breastfeeding.    Exam:  Constitutional: healthy appearing, alert and in no distress  Heent: Normocephalic. Head without obvious masses or lesions. PERRLDC, EOMI. Mouth exam within normal limits: tongue, mucous membranes, posterior pharynx all normal, no lesions or abnormalities seen.  Tm's and canals within normal limits bilaterally. Neck supple, no " nuchal rigidity or masses. No supraclavicular, or cervical adenopathy. Thyroid symmetric, no masses.  Cardiovascular: Regular rate and rhythm, no murmer, rub or gallops.  JVP not elevated, no edema.  Carotids within normal limits bilaterally, no bruits.  Respiratory: Normal respiratory effort.  Lungs clear, normal flow, no wheezing or crackles.  Breasts: Normal bilaterally.  No masses or lesions.  Nipples within normal limits.  No axillary lesions or nodes.  My M.A. Was present during this part of the examination.  Gastrointestinal: Normal active bowel sounds.   Soft, not tender, no masses, guarding or rebound.  No hepatosplenomegaly.   Musculoskeletal: extremities normal, no gross deformities noted.  Skin: no suspicious lesions or rashes x on elbows bilat, flat, irreg shapred annular rash, not red  Neurologic: Mental status within normal limits.  Speech fluent.  No gross motor abnormalities and gait intact.  Psychiatric: mentation appears normal and affect normal.  Feet within normal limits, dec pulses, no sores         Data:   Labs sent        Assessment:   1. Normal complete physical exam  2. Niddm, no c/o and not on meds  3. Knee pain due to djd  4. Obesity, weight loss  5. Skin rash, ?fungal  6. Vit d defic, last level fine  7. Elevated cholesterol on statin  8. Hypertension, ?control, she will check it and call if up  9. Dizziness, to rehab  10. Gerd, no issues  11. hcm         Plan:   Up to date immunizations, colon, mammogram  Exercise, diet  Dizzy eval  Lotrimin cream and call if not resolving  voltaren gel for knees  Letter with labs      Yoni Baxter M.D.

## 2019-03-08 ENCOUNTER — TELEPHONE (OUTPATIENT)
Dept: FAMILY MEDICINE | Facility: CLINIC | Age: 64
End: 2019-03-08

## 2019-03-08 DIAGNOSIS — M17.0 PRIMARY OSTEOARTHRITIS OF BOTH KNEES: ICD-10-CM

## 2019-03-08 LAB
ALBUMIN SERPL-MCNC: 3.5 G/DL (ref 3.4–5)
ALP SERPL-CCNC: 76 U/L (ref 40–150)
ALT SERPL W P-5'-P-CCNC: 16 U/L (ref 0–50)
ANION GAP SERPL CALCULATED.3IONS-SCNC: 3 MMOL/L (ref 3–14)
AST SERPL W P-5'-P-CCNC: 16 U/L (ref 0–45)
BILIRUB SERPL-MCNC: 0.5 MG/DL (ref 0.2–1.3)
BUN SERPL-MCNC: 12 MG/DL (ref 7–30)
CALCIUM SERPL-MCNC: 8.8 MG/DL (ref 8.5–10.1)
CHLORIDE SERPL-SCNC: 106 MMOL/L (ref 94–109)
CHOLEST SERPL-MCNC: 205 MG/DL
CO2 SERPL-SCNC: 31 MMOL/L (ref 20–32)
CREAT SERPL-MCNC: 0.6 MG/DL (ref 0.52–1.04)
GFR SERPL CREATININE-BSD FRML MDRD: >90 ML/MIN/{1.73_M2}
GLUCOSE SERPL-MCNC: 90 MG/DL (ref 70–99)
HDLC SERPL-MCNC: 52 MG/DL
LDLC SERPL CALC-MCNC: 118 MG/DL
NONHDLC SERPL-MCNC: 153 MG/DL
POTASSIUM SERPL-SCNC: 4.3 MMOL/L (ref 3.4–5.3)
PROT SERPL-MCNC: 7.1 G/DL (ref 6.8–8.8)
SODIUM SERPL-SCNC: 140 MMOL/L (ref 133–144)
TRIGL SERPL-MCNC: 177 MG/DL
TSH SERPL DL<=0.005 MIU/L-ACNC: 1.12 MU/L (ref 0.4–4)

## 2019-03-08 NOTE — TELEPHONE ENCOUNTER
Pharmacist states that the usual amount is 2 grams. Pharmacist authorized to fill 2 grams of voltaren gel 4 times daily onto both knees. Dispense 2 tubes with 3 refills.

## 2019-03-08 NOTE — RESULT ENCOUNTER NOTE
It was a pleasure seeing you.  I wanted to get back to you with your test results.  I have enclosed a copy for your records.    For the most part your labs look very good.  This includes your complete blood count, blood salts, kidney tests, liver tests, proteins, thyroid test, and hemoglobin A1c or diabetes test.    Your total cholesterol is too high at 205.  Your HDL or good cholesterol is very good at 52.  Your LDL or bad cholesterol is too high at 118.  These numbers are certainly much better than they were a year ago when your cholesterol was 291.  Please be sure to exercise as much as you can and eat a healthy diet to keep the numbers down.    I am happy to bring you this overall excellent report.  If you have any questions let me know

## 2019-03-08 NOTE — TELEPHONE ENCOUNTER
Fax received AGAIN from pharmacy.    They need to know the specific grams to use each application of Voltaren gel.    RT Bennie (R)

## 2019-03-08 NOTE — TELEPHONE ENCOUNTER
Fax from pharmacy  Please indicate how much (grams) patient is to use each application of Voltaren gel.      Rx re-pended - please add requested grams to use to SIG.    Betty Cameron RT (R)

## 2019-03-20 ENCOUNTER — HOSPITAL ENCOUNTER (OUTPATIENT)
Dept: PHYSICAL THERAPY | Facility: CLINIC | Age: 64
Setting detail: THERAPIES SERIES
End: 2019-03-20
Attending: INTERNAL MEDICINE
Payer: COMMERCIAL

## 2019-03-20 DIAGNOSIS — R42 VERTIGO: ICD-10-CM

## 2019-03-20 PROCEDURE — 97162 PT EVAL MOD COMPLEX 30 MIN: CPT | Mod: GP | Performed by: PHYSICAL THERAPIST

## 2019-03-20 PROCEDURE — 97112 NEUROMUSCULAR REEDUCATION: CPT | Mod: GP | Performed by: PHYSICAL THERAPIST

## 2019-03-20 NOTE — PROGRESS NOTES
03/20/19 0900   Quick Adds   Quick Adds Certification;Vestibular Eval   Type of Visit Initial OP PT Evaluation   General Information   Start of Care Date 03/20/19   Referring Physician Yoni Baxter MD   Orders Evaluate and Treat as Indicated   Additional Orders Balance/vestibular   Order Date 03/07/19   Medical Diagnosis Vertigo   Onset of illness/injury or Date of Surgery 12/01/08   Precautions/Limitations no known precautions/limitations   Surgical/Medical history reviewed Yes   Pertinent history of current vestibular problem (include personal factors and/or comorbidities that impact the POC)  Hearing loss;Motion sickness   Hearing Loss Comments Sound is muffled and she has a hard time hearing   Pertinent history of current problem (include personal factors and/or comorbidities that impact the POC) Patient has a PMH of knee OA, HTN, GERD, and DM type 2. Patient reports dizziness since 12/2008. Patient was sleeping and woke up with dizziness. Patient report she went to a physician who recommended Coburn-Daroff exercises. It didn't do much, but it just comes and goes. It is hard with her job and has been more bothersome, she also notes that it really bothers her at night. She has to get out of bed slowly and wait for it to pass. She also gets dizzy at work if she looks up or bends over, has to stop moving and let it pass - avoids moving her head up and down as much as possible. Patient has intermittent tinnitus unable to specify a side, has difficulty hearing - has to be in a quiet environment especially on the phone, feels like her ears need to pop.    Pertinent Visual History  Patient wears glasses, notes some blurry vision with reading but she was told she needs her prescription updated - has been two years since she has seen an opthamologist.   Prior level of function comment Patient does not do any formal exercise but walks a lot with her job   Current Community Support Family/friend  caregiver  ()   Patient role/Employment history Employed  ([employer] () at [employer])   Living environment House[facility]   Home/Community Accessibility Comments Lives with her son in a third floor apartment, has an elevator but sometimes takes the stairs.   Current Assistive Devices (None)   ADL Devices (None)   Patient/Family Goals Statement Decrease her dizziness   Fall Risk Screen   Fall screen completed by PT   Have you fallen 2 or more times in the past year? Yes   Have you fallen and had an injury in the past year? No  (Arthritic knee pain)   Timed Up and Go score (seconds) NT, see mCTSIB   Is patient a fall risk? Yes;Department fall risk interventions implemented   Fall screen comments Patient has fallen on the snow or ice but it is unusual for her   System Outcome Measures   Outcome Measures BPPV   Dizziness Handicap Inventory (score out of 100) A decrease in score by 17.18 or greater indicates a clinically significant change in symptoms. 28   Pain   Patient currently in pain Yes   Pain location B knees   Pain rating 8/10   Pain description Discomfort   Pain comments Worse with standing after prolonged sitting, topical cream recommended   Vitals Signs   Heart Rate 62   SpO2 100   Blood Pressure 154/92   Vital Signs Comments Resting, seated, right arm. Patient reports she cut one of her BP pills in half without her PCP's consent. Advised to take the recommended dosage of medication and talk to her PCP for any changes.   Cognitive Status Examination   Orientation orientation to person, place and time   Level of Consciousness alert   Follows Commands and Answers Questions able to follow multistep instructions;75% of the time   Personal Safety and Judgment intact   Memory intact   Integumentary   Integumentary No deficits were identified   Posture   Posture Forward head position;Protracted shoulders   Strength   Strength Comments Not formally tested   Bed Mobility   Bed Mobility Comments  Independent   Transfer Skills   Transfer Comments Independent with UE use   Gait   Gait Comments Patient ambulates en-bloc with slowed gait speed, and wide FREDIS. Avoids quick turns and head movement.   Gait Special Tests   Gait Special Tests DYNAMIC GAIT INDEX   Gait Special Tests Dynamic Gait Index   Score out of 24 14   Comments <19 indicates increased risk for falls   Balance Special Tests   Balance Special Tests Modified CTSIB Conditions   Balance Special Tests Modified CTSIB Conditions   Condition 1, seconds 30 Seconds   Condition 2, seconds 30 Seconds   Condition 4, seconds 30 Seconds   Condition 5, seconds 2 Seconds   Sensory Examination   Sensory Perception no deficits were identified   Coordination   Coordination no deficits were identified   Muscle Tone   Muscle Tone no deficits were identified   Cervicogenic Screen   Neck ROM WFL for positional testing, pain with end range flexion, rotation and side bend bilaterally   Vertebral Artery Test Other   Vertebral Artery Test Comments Modified sitting position, reports neck pain/strain in rotation with slight head pressure that does not get worse with prolonged positioning, no speech changes, vision changes or AMS   Oculomotor Exam   Smooth Pursuit Normal   Smooth Pursuit Comment Mild saccadic intrusion expected for age   Saccades Normal   Saccades Comments 1 undershoot consistently   VOR Normal   Rapid Head Thrust Normal   Rapid Head Thrust Comments Mild dizziness   Convergence Testing Normal   Convergence Testing Comments Mild dizziness   Infrared Goggle Exam or Frenzel Lense Exam   Vestibular Suppressant in Last 24 Hours? No   Exam completed with Infrared Goggles   Spontaneous Nystagmus Negative   Gaze Evoked Nystagmus Negative   Head Shake Horizontal Nystagmus Negative   Head Shake Horizontal Nystagmus comments Mild dizziness   Positional Testing comments Left torsional downbeating nystagmus with return to sit from right Jennifer-Hallpike   [place]-Hallpike (right)  Upbeating R torsional   [place]-Hallpike (right) comments 4 sec latency, 20 sec duration, fades into downbeating, moderate dizziness   [place]-Hallpike (Left) Downbeating L torsional   [place]-Hallpike (left) comments 3 sec latency, 20 sec duration, fades into downbeating, moderate dizziness   [facility] Supine Roll Test (Right) Upbeating R torsional   [facility] Supine Roll Test (Right) Comments 3 sec latency, 10 sec duration   [facility] Supine Roll Test (Left) Downbeating   [facility] Supine Roll Test (Left) Comments  3 sec latency, >1 minute duration   BPPV Canal(s) R Posterior   BPPV Type Canalithasis   Modality Interventions   Planned Modality Interventions Comments Per therapist discretion   Planned Therapy Interventions   Planned Therapy Interventions balance training;neuromuscular re-education;stretching  (Canalith repositioning maneuvers)   Clinical Impression   Criteria for Skilled Therapeutic Interventions Met yes, treatment indicated   PT Diagnosis s/s right BPPV   Influenced by the following impairments Nystagmus, positional vertigo, DHI, mCTSIB   Functional limitations due to impairments Impaired functional mobility with increased risk for falls, unable to change positions without dizziness   Clinical Presentation Evolving/Changing   Clinical Presentation Rationale Medically stable but with multi-directional nystagmus during positional testing, includes downbeating nystagmus   Clinical Decision Making (Complexity) Moderate complexity   Therapy Frequency 1 time/week   Predicted Duration of Therapy Intervention (days/wks) 4 weeks   Risk & Benefits of therapy have been explained Yes   Patient, Family & other staff in agreement with plan of care Yes   Clinical Impression Comments Patient is a 63 year old female presenting to physical therapy with dizziness over the past 10 years. Patient presents with s/s of right BPPV, educated on home Epley maneuver, instructed to stop if she has any increase in head pressure, changes in vision, speech,  or swallowing. Patient has good rehab potential and may benefit from skilled physical therapy to address these deficits.   Education Assessment   Preferred Learning Style Listening;Demonstration   Barriers to Learning No barriers   GOALS   PT Eval Goals 1;2;3;4   Goal 1   Goal Identifier HEP   Goal Description Patient will demonstrate understanding and compliance to her HEP for continued wellbeing upon discharge from skilled physical therapy.   Target Date 04/17/19   Goal 2   Goal Identifier DHI   Goal Description Patient will complete the DHI with a score of <4/100 to demonstrate decreased perception of handicap and improved quality of life.    Target Date 04/17/19   Goal 3   Goal Identifier mCTSIB   Goal Description Patient will maintain her balance with feet together and eyes closed on the foam for 30 seconds to demonstrate improved balance for safety with going to the bathroom at night.   Target Date 04/17/19   Goal 4   Goal Identifier Change of position   Goal Description Patient will deny dizziness with change of body position for independent bed mobility and transfers for return to daily activities without limitation.   Target Date 04/17/19   Total Evaluation Time   PT Eval, Moderate Complexity Minutes (44509) 50   Therapy Certification   Certification date from 03/20/19   Certification date to 04/17/19   Medical Diagnosis Vertigo   Certification I certify the need for these services furnished under this plan of treatment and while under my care.  (Physician co-signature of this document indicates review and certification of the therapy plan).

## 2019-03-20 NOTE — PROGRESS NOTES
03/20/19 1000   Signing Clinician's Name / Credentials   Signing clinician's name / credentials Felicita Beth DPT   Dynamic Gait Index (Josiah and Epps [place], 1995)   Gait Level Surface 1   Change in Gait Speed 1   Gait and Horizontal Head Turns 2   Gait with Vertical Head Turns 2   Gait and Pivot Turns 2   Step Over Obstacle 1   Step Around Obstacles 3   Steps 2   Total Dynamic Gait Index Score  (A score of 19 or less has been correlated to an increased risk of falls in community dwelling older adults, patients with vestibular disorders, and patients with MS.)   Total Score (out of 24) 14     Dynamic Gait Index (DGI):The DGI is a measure of balance during gait that is reliable and valid for the elderly and individuals with Parkinson's disease, MS, vestibular disorders, or s/p stroke. Gait assistive device used: None     Patient score: 14/24  Scores ?19/24 indicate an increased risk for falls according to Katalina et al 2000  Minimal Detectable Change = 2.9 in community dwelling elderly according to Cale et al 2011    Assessment (rationale for performing, application to patient s function & care plan): Assess risk for falls  Minutes billed as physical performance test: 0

## 2019-03-20 NOTE — PROGRESS NOTES
[facility]        OUTPATIENT PHYSICAL THERAPY FUNCTIONAL EVALUATION  PLAN OF TREATMENT FOR OUTPATIENT REHABILITATION  (COMPLETE FOR INITIAL CLAIMS ONLY)  Patient's Last Name, First Name, M.I.  YOB: 1955  Teresa Herron        Provider's Name   [facility]   Medical Record No.  [id number]     Start of Care Date:  03/20/19   Onset Date:  12/01/08   Type:     _X__PT   ____OT  ____SLP Medical Diagnosis:  Vertigo     PT Diagnosis:  s/s right BPPV Visits from SOC:  1                              __________________________________________________________________________________  Plan of Treatment/Functional Goals:  balance training, neuromuscular re-education, stretching(Canalith repositioning maneuvers)           GOALS  HEP  Patient will demonstrate understanding and compliance to her HEP for continued wellbeing upon discharge from skilled physical therapy.  04/17/19    DHI  Patient will complete the DHI with a score of <4/100 to demonstrate decreased perception of handicap and improved quality of life.   04/17/19    mCTSIB  Patient will maintain her balance with feet together and eyes closed on the foam for 30 seconds to demonstrate improved balance for safety with going to the bathroom at night.  04/17/19    Change of position  Patient will deny dizziness with change of body position for independent bed mobility and transfers for return to daily activities without limitation.  04/17/19                                                Therapy Frequency:  1 time/week   Predicted Duration of Therapy Intervention:  4 weeks    Felicita Beth, PT                                    I CERTIFY THE NEED FOR THESE SERVICES FURNISHED UNDER        THIS PLAN OF TREATMENT AND WHILE UNDER MY CARE     (Physician co-signature of this document indicates review and certification of the  therapy plan).                Certification Date From:  03/20/19   Certification Date To:  04/17/19    Referring Provider:  Yoni Baxter MD    Initial Assessment  See Epic Evaluation- Start of Care Date: 03/20/19

## 2019-04-01 ENCOUNTER — TELEPHONE (OUTPATIENT)
Dept: FAMILY MEDICINE | Facility: CLINIC | Age: 64
End: 2019-04-01

## 2019-04-01 DIAGNOSIS — H81.10 BENIGN PAROXYSMAL POSITIONAL VERTIGO, UNSPECIFIED LATERALITY: Primary | ICD-10-CM

## 2019-04-01 NOTE — PROGRESS NOTES
Raffi with PT is calling to follow up on this.  If the order is not signed by tomorrow morning the pt will not be able to be seen.  Please call Physical therapy with any questions:  [phone]

## 2019-04-01 NOTE — TELEPHONE ENCOUNTER
Reason for Call: Request for an order or referral:    Order or referral being requested: Physical therapy    Date needed: as soon as possible    Has the patient been seen by the PCP for this problem? YES    Additional comments:   Raffi with PT is calling to follow up on this.  If the order is not signed by tomorrow morning the pt will not be able to be seen.  Please call Physical therapy with any questions:  [phone]    Phone number Patient can be reached at:  NA    Best Time:  any    Can we leave a detailed message on this number?  YES    Call taken on 4/1/2019 at 2:28 PM by Anjali Real

## 2019-07-01 ENCOUNTER — TELEPHONE (OUTPATIENT)
Dept: FAMILY MEDICINE | Facility: CLINIC | Age: 64
End: 2019-07-01

## 2019-07-01 NOTE — TELEPHONE ENCOUNTER
Spoke with pharmacy: They have refills of all medications available on Profile.     Will prepare.     Spoke with patient to update.     Mera COLE RN

## 2019-07-02 ENCOUNTER — OFFICE VISIT (OUTPATIENT)
Dept: FAMILY MEDICINE | Facility: CLINIC | Age: 64
End: 2019-07-02
Payer: COMMERCIAL

## 2019-07-02 ENCOUNTER — NURSE TRIAGE (OUTPATIENT)
Dept: FAMILY MEDICINE | Facility: CLINIC | Age: 64
End: 2019-07-02

## 2019-07-02 VITALS
SYSTOLIC BLOOD PRESSURE: 132 MMHG | BODY MASS INDEX: 29.81 KG/M2 | TEMPERATURE: 97.4 F | DIASTOLIC BLOOD PRESSURE: 84 MMHG | HEART RATE: 71 BPM | WEIGHT: 142 LBS | OXYGEN SATURATION: 97 % | HEIGHT: 58 IN

## 2019-07-02 DIAGNOSIS — I10 BENIGN ESSENTIAL HYPERTENSION: ICD-10-CM

## 2019-07-02 DIAGNOSIS — E78.5 HYPERLIPIDEMIA LDL GOAL <130: ICD-10-CM

## 2019-07-02 DIAGNOSIS — J06.9 VIRAL URI: ICD-10-CM

## 2019-07-02 DIAGNOSIS — R05.9 COUGH: ICD-10-CM

## 2019-07-02 DIAGNOSIS — R05.9 COUGH: Primary | ICD-10-CM

## 2019-07-02 PROCEDURE — 99213 OFFICE O/P EST LOW 20 MIN: CPT | Performed by: INTERNAL MEDICINE

## 2019-07-02 RX ORDER — CODEINE PHOSPHATE AND GUAIFENESIN 10; 100 MG/5ML; MG/5ML
1-2 SOLUTION ORAL EVERY 6 HOURS PRN
Qty: 180 ML | Refills: 0 | Status: SHIPPED | OUTPATIENT
Start: 2019-07-02 | End: 2020-01-08

## 2019-07-02 RX ORDER — HYDROCHLOROTHIAZIDE 12.5 MG/1
12.5 TABLET ORAL DAILY
Qty: 90 TABLET | Refills: 3 | Status: SHIPPED | OUTPATIENT
Start: 2019-07-02 | End: 2020-05-07

## 2019-07-02 RX ORDER — CODEINE PHOSPHATE AND GUAIFENESIN 10; 100 MG/5ML; MG/5ML
1-2 SOLUTION ORAL EVERY 6 HOURS PRN
Qty: 120 ML | Refills: 0 | Status: CANCELLED | OUTPATIENT
Start: 2019-07-02

## 2019-07-02 RX ORDER — SIMVASTATIN 40 MG
40 TABLET ORAL AT BEDTIME
Qty: 90 TABLET | Refills: 3 | Status: SHIPPED | OUTPATIENT
Start: 2019-07-02 | End: 2020-07-22

## 2019-07-02 RX ORDER — LOSARTAN POTASSIUM 100 MG/1
100 TABLET ORAL DAILY
Qty: 90 TABLET | Refills: 3 | Status: SHIPPED | OUTPATIENT
Start: 2019-07-02 | End: 2020-04-02

## 2019-07-02 ASSESSMENT — MIFFLIN-ST. JEOR: SCORE: 1093.86

## 2019-07-02 NOTE — PROGRESS NOTES
The patient presents for a cough and a cold.  Is been about 7 days, her  had it first.  She has not had a fever but has a cough and itchy throat.  She had diarrhea but this is resolved.  No abdominal pain or nausea or vomiting.  No earache but a slight headache.  Some nasal discharge that is clear.  Mostly the cough is dry but in the morning she can have some colored phlegm.    Past Medical History:   Diagnosis Date     Arthritis of knee, degenerative     seen by Dr. Jones 2017 and cortisone did not help     Chest pain 4/15    nl est echo     Dizziness years     GERD (gastroesophageal reflux disease)      H/O colonoscopy 2014    tics, tortuous, fu 5 years     HTN (hypertension) 1988     Hypercholesteraemia 2010     Ruptured appendicitis 5/14    had surgery fsd     Type II or unspecified type diabetes mellitus without mention of complication, not stated as uncontrolled     not on meds as of 2018     Vitamin D deficiency      Past Surgical History:   Procedure Laterality Date     COLONOSCOPY N/A 11/18/2014    Procedure: COLONOSCOPY;  Surgeon: Yoni Carvajal MD;  Location:  GI     fibroid surgery  2000     HYSTERECTOMY, PAP NO LONGER INDICATED  2006    estrada/bso due to pain and bleeding     LAPAROSCOPIC APPENDECTOMY  5/19/2014    Procedure: LAPAROSCOPIC APPENDECTOMY;  Surgeon: Will Barth MD;  Location: [facility]     Social History     Socioeconomic History     Marital status:      Spouse name: Not on file     Number of children: 1     Years of education: Not on file     Highest education level: Not on file   Occupational History     Occupation: work dietary, in kitchen     Employer: [employer]   Social Needs     Financial resource strain: Not on file     Food insecurity:     Worry: Not on file     Inability: Not on file     Transportation needs:     Medical: Not on file     Non-medical: Not on file   Tobacco Use     Smoking status: Never Smoker     Smokeless tobacco: Never Used  "  Substance and Sexual Activity     Alcohol use: No     Alcohol/week: 0.0 oz     Drug use: No     Sexual activity: Not Currently   Lifestyle     Physical activity:     Days per week: Not on file     Minutes per session: Not on file     Stress: Not on file   Relationships     Social connections:     Talks on phone: Not on file     Gets together: Not on file     Attends Jain service: Not on file     Active member of club or organization: Not on file     Attends meetings of clubs or organizations: Not on file     Relationship status: Not on file     Intimate partner violence:     Fear of current or ex partner: Not on file     Emotionally abused: Not on file     Physically abused: Not on file     Forced sexual activity: Not on file   Other Topics Concern     Not on file   Social History Narrative     Not on file     Current Outpatient Medications   Medication Sig Dispense Refill     guaiFENesin-codeine (ROBITUSSIN AC) 100-10 MG/5ML solution Take 5-10 mLs by mouth every 6 hours as needed for cough 180 mL 0     hydrochlorothiazide (HYDRODIURIL) 12.5 MG tablet Take 1 tablet (12.5 mg) by mouth daily 90 tablet 3     losartan (COZAAR) 100 MG tablet Take 1 tablet (100 mg) by mouth daily 90 tablet 3     simvastatin (ZOCOR) 40 MG tablet Take 1 tablet (40 mg) by mouth At Bedtime 90 tablet 3     aspirin 81 MG EC tablet Take 1 tablet (81 mg) by mouth daily 90 tablet 3     Cholecalciferol (VITAMIN D) 2000 UNITS tablet TAKE ONE TABLET BY MOUTH ONE TIME DAILY 30 tablet 0     clotrimazole (LOTRIMIN) 1 % external cream Apply topically 2 times daily 15 g 1     diclofenac (VOLTAREN) 1 % topical gel Place onto the skin 4 times daily Use on knees bilaterally, small amount 2 Tube 3     omega 3 1000 MG CAPS Take 1 g by mouth daily 90 capsule      No Known Allergies  FAMILY HISTORY NOTED AND REVIEWED    REVIEW OF SYSTEMS: above    PHYSICAL EXAM    /84   Pulse 71   Temp 97.4  F (36.3  C) (Oral)   Ht 1.473 m (4' 10\")   Wt 64.4 kg " (142 lb)   SpO2 97%   Breastfeeding? No   BMI 29.68 kg/m      Patient appears non toxic  Tympanic membranes and canals: within normal limits bilaterally.   Mouth: Posterior pharynx, mucous membranes and tongue exam within normal limits.  Neck: supple, no nuchal rigidity or masses.  No anterior or posterior cervical adenopathy.    Lungs: clear, normal flow and effort.      ASSESSMENT:  Cough, uri, most c/w viral process      PLAN:  Anibal ac  Call if worsens, fever or not gone soon    Yoni Baxter M.D.

## 2019-07-02 NOTE — TELEPHONE ENCOUNTER
"New direct admit from [facility]. Came here with . A&o. Ambulatory. Voided upon arrival. Reports vaginal pain 3/10. Denies hematuria. States last ate at 630pm tonight. Patient reports \"I will have surgery tomorrow\" and wants to know \"what time\". A&o x4, anxious.  I informed patient that no order yet but will page MD regarding arrival. SBP upon arrival 140's, hr 55-62. Pt did mention that he was at hypertensive urgency in [place] with BP highest 251/114, HR 68. Clonidine given with good result. Pt has scheduled coreg at home. Will inform MD. Call light within reach.   " Called pt - added to schedule at 1:45pm. Pt states she is still at the clinic so will go to the  to check in now    Alisha MEDINA RN

## 2019-07-02 NOTE — LETTER
[place] [address] [facility] [address]  Phone: [phone]    07/02/19    Teresa Herron  [address]   [facility] [address]      To whom it may concern:     I saw this patient today for an office visit.    Sincerely,        Yoni Baxter MD

## 2019-07-02 NOTE — TELEPHONE ENCOUNTER
Reason for call:  Patient reporting a symptom    Symptom or request: cough, headache, insomnia,  previously had diarrhea, but that is improved.     Duration (how long have symptoms been present): since last wednesday    Have you been treated for this before? Yes    Additional comments: Patient only wanted to see Dr Baxter so didn't want available appts today.  She said he had recommended some liquid to drink that had helped her last time she had these symptoms.     Phone Number patient can be reached at: [phone]  Best Time:  any    Can we leave a detailed message on this number:  YES    Call taken on 7/2/2019 at 10:52 AM by Nhi Jama

## 2019-07-02 NOTE — TELEPHONE ENCOUNTER
"TO PCP:   Spoke with patient   Has cough that started last week, productive/green.   No fever, SOB, or other concerns   Has only tried Nyquil    Discussed OTC medications for productive cough/home care instructions per protocol and sx to call back for.     However, pt requesting OV today with PCP or to get Guaifenesin-Codeine Rx without appt. Pt does not want to go to  for this. Asking if PCP can fit her in today or tomorrow. Advised PCP is off tomorrow and schedule full today.       Please advise   Alisha MEDINA RN      Initial Assessment Questions   1. ONSET: \"When did the cough begin?\" started last week   2. SEVERITY: \"How bad is the cough today?\" every 2 seconds coughing   3. RESPIRATORY DISTRESS: \"Describe your breathing.\" just coughing a lot  4. FEVER: \"Do you have a fever?\" If so, ask: \"What is your temperature, how was it measured, and when did it start?\" no fever currently  5. HEMOPTYSIS: \"Are you coughing up any blood?\" If so ask: \"How much?\" (flecks, streaks, tablespoons, etc.) none   6. TREATMENT: \"What have you done so far to treat the cough?\" (e.g., meds, fluids, humidifier)Nyquil   7. CARDIAC HISTORY: \"Do you have any history of heart disease?\" (e.g., heart attack, congestive heart failure) no   8. LUNG HISTORY: \"Do you have any history of lung disease?\" (e.g., pulmonary embolus, asthma, emphysema)  No   9. PE RISK FACTORS: \"Do you have a history of blood clots?\" (or: recent major surgery, recent prolonged travel, bedridden ) no   10. OTHER SYMPTOMS: \"Do you have any other symptoms? (e.g., runny nose, wheezing, chest pain) denies other symptoms, just cough   11. PREGNANCY: \"Is there any chance you are pregnant?\" \"When was your last menstrual period?\" no          Additional Information    Negative: Bluish (or gray) lips or face    Negative: Severe difficulty breathing (e.g., struggling for each breath, speaks in single words)    Negative: Rapid onset of cough and has hives    Negative: Coughing started " suddenly after medicine, an allergic food or bee sting    Negative: Difficulty breathing after exposure to flames, smoke, or fumes    Negative: Sounds like a life-threatening emergency to the triager    Negative: Previous asthma attacks and this feels like asthma attack    Negative: Chest pain present when not coughing    Negative: Difficulty breathing    Negative: Passed out (i.e., fainted, collapsed and was not responding)    Negative: Patient sounds very sick or weak to the triager    Negative: Coughed up > 1 tablespoon (15 ml) blood (Exception: blood-tinged sputum)    Negative: Fever > 103 F (39.4 C)    Negative: Fever > 101 F (38.3 C) and over 60 years of age    Negative: Fever > 100.0 F (37.8 C) and has diabetes mellitus or a weak immune system (e.g., HIV positive, cancer chemotherapy, organ transplant, splenectomy, chronic steroids)    Negative: Fever > 100.0 F (37.8 C) and bedridden (e.g., nursing home patient, stroke, chronic illness, recovering from surgery)    Negative: Increasing ankle swelling    Negative: Wheezing is present    Negative: SEVERE coughing spells (e.g., whooping sound after coughing, vomiting after coughing)    Negative: Coughing up ni-colored (reddish-brown) or blood-tinged sputum    Negative: Fever present > 3 days (72 hours)    Negative: Fever returns after gone for over 24 hours and symptoms worse or not improved    Negative: Using nasal washes and pain medicine > 24 hours and sinus pain persists    Negative: Known COPD or other severe lung disease (i.e., bronchiectasis, cystic fibrosis, lung surgery) and worsening symptoms (i.e., increased sputum purulence or amount, increased breathing difficulty)    Patient wants to be seen    Protocols used: COUGH-A-OH

## 2019-08-15 ENCOUNTER — OFFICE VISIT (OUTPATIENT)
Dept: URGENT CARE | Facility: URGENT CARE | Age: 64
End: 2019-08-15
Payer: COMMERCIAL

## 2019-08-15 VITALS
BODY MASS INDEX: 30.75 KG/M2 | DIASTOLIC BLOOD PRESSURE: 80 MMHG | RESPIRATION RATE: 18 BRPM | WEIGHT: 146.5 LBS | OXYGEN SATURATION: 100 % | TEMPERATURE: 97.9 F | SYSTOLIC BLOOD PRESSURE: 136 MMHG | HEART RATE: 65 BPM | HEIGHT: 58 IN

## 2019-08-15 DIAGNOSIS — J39.2 THROAT IRRITATION: ICD-10-CM

## 2019-08-15 DIAGNOSIS — J34.89 PURULENT NASAL DISCHARGE: ICD-10-CM

## 2019-08-15 DIAGNOSIS — H10.33 ACUTE CONJUNCTIVITIS OF BOTH EYES, UNSPECIFIED ACUTE CONJUNCTIVITIS TYPE: ICD-10-CM

## 2019-08-15 DIAGNOSIS — J31.0 CHRONIC RHINITIS: ICD-10-CM

## 2019-08-15 DIAGNOSIS — E11.9 TYPE 2 DIABETES MELLITUS WITHOUT COMPLICATION, WITHOUT LONG-TERM CURRENT USE OF INSULIN (H): Primary | ICD-10-CM

## 2019-08-15 PROCEDURE — 99214 OFFICE O/P EST MOD 30 MIN: CPT | Performed by: PHYSICIAN ASSISTANT

## 2019-08-15 RX ORDER — FLUTICASONE PROPIONATE 50 MCG
1-2 SPRAY, SUSPENSION (ML) NASAL DAILY
Qty: 16 G | Refills: 0
Start: 2019-08-15 | End: 2020-09-04

## 2019-08-15 RX ORDER — OLOPATADINE HYDROCHLORIDE 1 MG/ML
1 SOLUTION/ DROPS OPHTHALMIC 2 TIMES DAILY
Qty: 5 ML | Refills: 0 | Status: SHIPPED | OUTPATIENT
Start: 2019-08-15 | End: 2020-09-04

## 2019-08-15 RX ORDER — CETIRIZINE HYDROCHLORIDE 10 MG/1
10 TABLET ORAL DAILY
Qty: 30 TABLET | Refills: 0 | Status: SHIPPED | OUTPATIENT
Start: 2019-08-15 | End: 2020-04-02

## 2019-08-15 RX ORDER — AMOXICILLIN 875 MG
875 TABLET ORAL 2 TIMES DAILY
Qty: 20 TABLET | Refills: 0 | Status: SHIPPED | OUTPATIENT
Start: 2019-08-15 | End: 2020-01-08

## 2019-08-15 ASSESSMENT — MIFFLIN-ST. JEOR: SCORE: 1114.27

## 2019-08-16 NOTE — PROGRESS NOTES
SUBJECTIVE:   Teresa Herron is a 64 year old female presenting with a chief complaint of having purulent nasal drainage, eye redness and mattering, runny nose.  Onset of symptoms was 1 week(s) ago.  Course of illness is same.    Severity moderate  Current and Associated symptoms: throat irritation  Treatment measures tried include OTC medications.  Predisposing factors include recent illness.    Past Medical History:   Diagnosis Date     Arthritis of knee, degenerative     seen by Dr. Jones 2017 and cortisone did not help     Chest pain 4/15    nl est echo     Dizziness years     GERD (gastroesophageal reflux disease)      H/O colonoscopy 2014    tics, tortuous, fu 5 years     HTN (hypertension) 1988     Hypercholesteraemia 2010     Ruptured appendicitis 5/14    had surgery fsd     Type II or unspecified type diabetes mellitus without mention of complication, not stated as uncontrolled     not on meds as of 2018     Vitamin D deficiency      ALLERGIES   No Known Allergies    Family History   Problem Relation Age of Onset     Diabetes Mother      Hypertension Mother      Other - See Comments Sister         4 sisters and 3 brothers, dm, kidney dz       Social History     Tobacco Use     Smoking status: Never Smoker     Smokeless tobacco: Never Used   Substance Use Topics     Alcohol use: No     Alcohol/week: 0.0 oz       ROS:  CONSTITUTIONAL:NEGATIVE for fever, chills, change in weight  INTEGUMENTARY/SKIN: NEGATIVE for worrisome rashes, moles or lesions  EYES: POSITIVE for eye drainage, watering  ENT/MOUTH: POSITIVE for purulent nasal drainage, congestion  RESP:POSITIVE for cough-non productive  CV: NEGATIVE for chest pain, palpitations or peripheral edema  GI: NEGATIVE for nausea, abdominal pain, heartburn, or change in bowel habits  : negative for and dysuria  MUSCULOSKELETAL: NEGATIVE for significant arthralgias or myalgia  NEURO: NEGATIVE for weakness, dizziness or paresthesias    OBJECTIVE  :/80    "Pulse 65   Temp 97.9  F (36.6  C) (Tympanic)   Resp 18   Ht 1.473 m (4' 10\")   Wt 66.5 kg (146 lb 8 oz)   SpO2 100%   BMI 30.62 kg/m    GENERAL APPEARANCE: healthy, alert and no distress  EYES: conjunctiva/corneas- conjunctival injection   HENT: TM's normal bilaterally, nasal turbinates erythematous, swollen and rhinorrhea   NECK: supple, nontender, no lymphadenopathy  RESP: lungs clear to auscultation - no rales, rhonchi or wheezes  CV: regular rates and rhythm, normal S1 S2, no murmur noted  ABDOMEN:  soft, nontender, no HSM or masses and bowel sounds normal  NEURO: Normal strength and tone, sensory exam grossly normal,  normal speech and mentation  SKIN: no suspicious lesions or rashes    ASSESSMENT/PLAN      ICD-10-CM    1. Type 2 diabetes mellitus without complication, without long-term current use of insulin (H) E11.9    2. Acute conjunctivitis of both eyes, unspecified acute conjunctivitis type H10.33 fluticasone (FLONASE) 50 MCG/ACT nasal spray     olopatadine (PATANOL) 0.1 % ophthalmic solution   3. Chronic rhinitis J31.0 cetirizine (ZYRTEC) 10 MG tablet   4. Throat irritation J39.2 fluticasone (FLONASE) 50 MCG/ACT nasal spray   5. Purulent nasal discharge J34.89 amoxicillin (AMOXIL) 875 MG tablet       Orders Placed This Encounter     cetirizine (ZYRTEC) 10 MG tablet     fluticasone (FLONASE) 50 MCG/ACT nasal spray     amoxicillin (AMOXIL) 875 MG tablet     olopatadine (PATANOL) 0.1 % ophthalmic solution       Fluids, rest  Follow up as needed  See orders in Epic  "

## 2019-12-17 NOTE — TELEPHONE ENCOUNTER
Called pt back:   Informed of below message - scheduled physical for 12/7/17    States at her OV last year PCP had written down the name and number for an orthopedic physician but she lost the paper   She continues to have leg pain that switches between her left and right leg   Both ankles are painful, pain in left knee and left hip  Also having feet pain and tingling pain in feet   Currently her left leg is worse than right   Offered OV to discuss this but pt declined and states she just wants to see an orthopedic provider for this     Please advise    Pt states if she does not answer below mobile number, please leave a detailed message at home phone: [phone]   Alisha MEDINA RN     Patient/Caregiver provided printed discharge information.

## 2020-01-05 ENCOUNTER — TRANSFERRED RECORDS (OUTPATIENT)
Dept: HEALTH INFORMATION MANAGEMENT | Facility: CLINIC | Age: 65
End: 2020-01-05

## 2020-01-05 LAB — RETINOPATHY: NORMAL

## 2020-01-08 ENCOUNTER — OFFICE VISIT (OUTPATIENT)
Dept: FAMILY MEDICINE | Facility: CLINIC | Age: 65
End: 2020-01-08
Payer: COMMERCIAL

## 2020-01-08 VITALS
BODY MASS INDEX: 30.51 KG/M2 | WEIGHT: 146 LBS | HEART RATE: 72 BPM | DIASTOLIC BLOOD PRESSURE: 84 MMHG | SYSTOLIC BLOOD PRESSURE: 136 MMHG | TEMPERATURE: 97.1 F | OXYGEN SATURATION: 97 %

## 2020-01-08 DIAGNOSIS — E11.9 TYPE 2 DIABETES MELLITUS WITHOUT COMPLICATION, WITHOUT LONG-TERM CURRENT USE OF INSULIN (H): ICD-10-CM

## 2020-01-08 DIAGNOSIS — R21 RASH AND NONSPECIFIC SKIN ERUPTION: ICD-10-CM

## 2020-01-08 DIAGNOSIS — I10 BENIGN ESSENTIAL HYPERTENSION: ICD-10-CM

## 2020-01-08 DIAGNOSIS — K21.9 GASTROESOPHAGEAL REFLUX DISEASE WITHOUT ESOPHAGITIS: ICD-10-CM

## 2020-01-08 DIAGNOSIS — E55.9 VITAMIN D DEFICIENCY: ICD-10-CM

## 2020-01-08 DIAGNOSIS — L29.9 PRURITIC DISORDER: Primary | ICD-10-CM

## 2020-01-08 DIAGNOSIS — L85.3 XEROSIS CUTIS: ICD-10-CM

## 2020-01-08 DIAGNOSIS — M17.0 PRIMARY OSTEOARTHRITIS OF BOTH KNEES: ICD-10-CM

## 2020-01-08 PROCEDURE — 99213 OFFICE O/P EST LOW 20 MIN: CPT | Performed by: INTERNAL MEDICINE

## 2020-01-08 RX ORDER — TRIAMCINOLONE ACETONIDE 1 MG/G
CREAM TOPICAL 2 TIMES DAILY
Qty: 30 G | Refills: 3 | Status: SHIPPED | OUTPATIENT
Start: 2020-01-08 | End: 2020-09-04

## 2020-01-08 NOTE — PROGRESS NOTES
Subjective     Teresa Herron is a 64 year old female who presents to clinic today for the following health issues:    HPI   Rash      Duration: Since Sunday night    Description  Location: arms, chest, legs and upper back  Itching: severe, intensifies in the PM     Intensity:  severe    Accompanying signs and symptoms: light red rash, mild fever (PM)     History (similar episodes/previous evaluation): None     Precipitating or alleviating factors:  New exposures: new perfume  Recent travel: no      Therapies tried and outcome: Benadryl/diphenhydramine -  not effective,l miconazole - not effective    Patient Active Problem List   Diagnosis     Benign essential hypertension     Hyperlipidemia LDL goal <130     Vitamin D deficiency     Type 2 diabetes mellitus without complication, without long-term current use of insulin (H)     Gastroesophageal reflux disease without esophagitis     Non morbid obesity, unspecified obesity type     Arthritis of knee, degenerative     Past Surgical History:   Procedure Laterality Date     COLONOSCOPY N/A 11/18/2014    Procedure: COLONOSCOPY;  Surgeon: Yoni Carvajal MD;  Location:  GI     fibroid surgery  2000     HYSTERECTOMY, PAP NO LONGER INDICATED  2006    estrada/bso due to pain and bleeding     LAPAROSCOPIC APPENDECTOMY  5/19/2014    Procedure: LAPAROSCOPIC APPENDECTOMY;  Surgeon: Will Barth MD;  Location:  [facility]       Social History     Tobacco Use     Smoking status: Never Smoker     Smokeless tobacco: Never Used   Substance Use Topics     Alcohol use: No     Alcohol/week: 0.0 standard drinks     Family History   Problem Relation Age of Onset     Diabetes Mother      Hypertension Mother      Other - See Comments Sister         4 sisters and 3 brothers, dm, kidney dz         Current Outpatient Medications   Medication Sig Dispense Refill     aspirin 81 MG EC tablet Take 1 tablet (81 mg) by mouth daily 90 tablet 3     cetirizine (ZYRTEC) 10 MG tablet Take 1 tablet  (10 mg) by mouth daily 30 tablet 0     Cholecalciferol (VITAMIN D) 2000 UNITS tablet TAKE ONE TABLET BY MOUTH ONE TIME DAILY 30 tablet 0     clotrimazole (LOTRIMIN) 1 % external cream Apply topically 2 times daily 15 g 1     diclofenac (VOLTAREN) 1 % topical gel Place onto the skin 4 times daily Use on knees bilaterally, small amount 2 Tube 3     fluticasone (FLONASE) 50 MCG/ACT nasal spray Spray 1-2 sprays into both nostrils daily 16 g 0     hydrochlorothiazide (HYDRODIURIL) 12.5 MG tablet Take 1 tablet (12.5 mg) by mouth daily 90 tablet 3     losartan (COZAAR) 100 MG tablet Take 1 tablet (100 mg) by mouth daily 90 tablet 3     olopatadine (PATANOL) 0.1 % ophthalmic solution Place 1 drop into both eyes 2 times daily 5 mL 0     omega 3 1000 MG CAPS Take 1 g by mouth daily 90 capsule      simvastatin (ZOCOR) 40 MG tablet Take 1 tablet (40 mg) by mouth At Bedtime 90 tablet 3     triamcinolone (KENALOG) 0.1 % external cream Apply topically 2 times daily 30 g 3     No Known Allergies    Reviewed and updated as needed this visit by Provider         Review of Systems   ROS COMP: POSITIVE for knee pain bilaterally HEENT, cardiovascular, pulmonary, gi and gu systems are negative, except as otherwise noted.    This document serves as a record of the services and decisions personally performed and made by Quinn Ray MD. It was created on his behalf by Kavon Anderson, a trained medical scribe. The creation of this document is based on the provider's statements to the medical scribe.  Kavon Anderson January 8, 2020 8:14 AM          Objective    /84 (BP Location: Left arm, Cuff Size: Adult Regular)   Pulse 72   Temp 97.1  F (36.2  C) (Tympanic)   Wt 66.2 kg (146 lb)   SpO2 97%   BMI 30.51 kg/m    Body mass index is 30.51 kg/m .     Physical Exam   Neck was supple without adenopathy or thyromegaly her carotids were normal without bruits  Chest clear to auscultation and percussion  Cardiovascular S1 and S2 are  "physiologic without murmurs or gallops  Abdomen bowel sounds were normal.  There is no palpable mass or organomegaly  Extremities nontender without any edema  Pulses pedal pulses are as described otherwise his pulses are bilaterally symmetrical throughout without bruits  Skin raised erythematous areas of her arms, abdomen, chest and legs with associated dry scaly skin      Diagnostic Test Results:  Labs reviewed in Epic  none         Assessment & Plan     Pruritic disorder      Type 2 diabetes mellitus without complication, without long-term current use of insulin (H)      Benign essential hypertension  Well controlled with current therapies     Rash and nonspecific skin eruption      Primary osteoarthritis of both knees      Gastroesophageal reflux disease without esophagitis      Vitamin D deficiency      Xerosis cutis  Suspect the itchy sensation is due to excessively dry skin. Recommended 1 tablet of Zyrtec or Claritin during the day. If the itching sensation persists, the pt can take Benadryl at HS for relief. The pt should start washing her body with Dove soap and discontinue Anika Spring. After bathing the pt should apply Lubriderm lotion or Eucerin cream for a moisturizer. For episodes of itching sensations the pt should apply an equal mixture of triamcinolone and Eucerin cream to the affected area.   - triamcinolone (KENALOG) 0.1 % external cream  Dispense: 30 g; Refill: 3         BMI:   Estimated body mass index is 30.51 kg/m  as calculated from the following:    Height as of 8/15/19: 1.473 m (4' 10\").    Weight as of this encounter: 66.2 kg (146 lb).   Weight management plan: Discussed healthy diet and exercise guidelines        FUTURE APPOINTMENTS:       - Follow-up visit with Dr. Baxter for Wellness exam     No follow-ups on file.    The information in this document, created by the medical scribe for me, accurately reflects the services I personally performed and the decisions made by me. I have " reviewed and approved this document for accuracy prior to leaving the patient care area.  January 8, 2020 8:37 AM    Quinn Ray MD  [facility]

## 2020-01-17 ENCOUNTER — TELEPHONE (OUTPATIENT)
Dept: FAMILY MEDICINE | Facility: CLINIC | Age: 65
End: 2020-01-17

## 2020-01-17 DIAGNOSIS — I10 BENIGN ESSENTIAL HYPERTENSION: Primary | ICD-10-CM

## 2020-01-18 RX ORDER — IRBESARTAN 300 MG/1
300 TABLET ORAL AT BEDTIME
Qty: 90 TABLET | Refills: 3 | Status: SHIPPED | OUTPATIENT
Start: 2020-01-18 | End: 2020-09-04

## 2020-04-02 ENCOUNTER — VIRTUAL VISIT (OUTPATIENT)
Dept: FAMILY MEDICINE | Facility: CLINIC | Age: 65
End: 2020-04-02
Payer: COMMERCIAL

## 2020-04-02 DIAGNOSIS — R21 RASH AND NONSPECIFIC SKIN ERUPTION: Primary | ICD-10-CM

## 2020-04-02 PROCEDURE — 99212 OFFICE O/P EST SF 10 MIN: CPT | Mod: TEL | Performed by: INTERNAL MEDICINE

## 2020-04-02 RX ORDER — TRIAMCINOLONE ACETONIDE 1 MG/G
CREAM TOPICAL 2 TIMES DAILY
Qty: 45 G | Refills: 0 | Status: SHIPPED | OUTPATIENT
Start: 2020-04-02 | End: 2020-09-04

## 2020-04-02 NOTE — Clinical Note
Please abstract the following data from this visit with this patient into the appropriate field in Epic:    Tests that can be patient reported without a hard copy:    Eye exam with ophthalmology on this date: 1/5/20    Other Tests found in the patient's chart through Chart Review/Care Everywhere:    Eye exam with ophthalmology on this date: [facility]    Note to Abstraction: If this section is blank, no results were found via Chart Review/Care Everywhere.

## 2020-04-02 NOTE — PROGRESS NOTES
"Teresa Herron is a 64 year old female who is being evaluated via a telephone visit.      The patient has been notified of following (by AMNA Spivey CMA       \"We have found that certain health care needs can be provided without the need for a physical exam.  This service lets us provide the care you need with a short phone conversation.  If a prescription is necessary we can send it directly to your pharmacy.  If lab work is needed we can place an order for that and you can then stop by our lab to have the test done at a later time.    This telephone visit will be conducted via 3 way call with the you (the patient) , the physician/provider, and a me all on the line at the same time.  This allows your physician/provider to have the phone conversation with you while I will be taking notes for your medical record.  We will have full access to your [facility] medical record during this entire phone call.    Since this is like an office visit,  will bill your insurance company for this service.  Please check with your medical insurance if this type of telephone/virtual is covered . You may be responsible for the cost of this service if insurance coverage is denied.  The typical cost is $30 (10min), $59(11-20min) and $85 (21-30min)     If during the course of the call the physician/provider feels a telephone visit is not appropriate, you will not be charged for this service\"    Consent has been obtained for this service by care team member: yes.  See the scanned image in the medical record.      Itching on body, all over, has been ongoing for few 2 weeks, same rash as in January.  Not ill.  Itches and uncomfortable.  No fever.  No new meds, detergents, soaps, etc.  She feels fine.  In January got triamc cream and resolved with that.  I will add that but if not resolving soon needs to be seen and will refer to derm.  Patient agrees.    Yoni Baxter M.D.    Time:  6:45    "

## 2020-05-07 DIAGNOSIS — I10 BENIGN ESSENTIAL HYPERTENSION: ICD-10-CM

## 2020-05-07 RX ORDER — HYDROCHLOROTHIAZIDE 12.5 MG/1
TABLET ORAL
Qty: 90 TABLET | Refills: 0 | Status: SHIPPED | OUTPATIENT
Start: 2020-05-07 | End: 2020-08-03

## 2020-05-07 NOTE — TELEPHONE ENCOUNTER
Routing refill request to provider for review/approval because:  Labs not current:    Please authorize if appropriate.  Thanks,  Ghada Vogel RN

## 2020-07-14 NOTE — ADDENDUM NOTE
Encounter addended by: Felicita Beth, PT on: 7/14/2020 1:26 PM   Actions taken: Episode resolved, Flowsheet accepted

## 2020-07-22 DIAGNOSIS — E78.5 HYPERLIPIDEMIA LDL GOAL <130: ICD-10-CM

## 2020-07-22 RX ORDER — SIMVASTATIN 40 MG
40 TABLET ORAL AT BEDTIME
Qty: 90 TABLET | Refills: 0 | Status: SHIPPED | OUTPATIENT
Start: 2020-07-22 | End: 2020-09-04

## 2020-07-22 NOTE — TELEPHONE ENCOUNTER
Routing refill request to provider for review/approval because:  Labs not current:  LDL    Please review and authorize if appropriate.    Thank you,   Kris WANG RN

## 2020-07-22 NOTE — TELEPHONE ENCOUNTER
Next 5 appointments (look out 90 days)    Sep 04, 2020  8:00 AM CDT  PHYSICAL with Yoni Baxter MD  [facility] [address] [facility] [address]  [phone]        RT Bennie (R)

## 2020-08-01 DIAGNOSIS — I10 BENIGN ESSENTIAL HYPERTENSION: ICD-10-CM

## 2020-08-03 RX ORDER — HYDROCHLOROTHIAZIDE 12.5 MG/1
TABLET ORAL
Qty: 90 TABLET | Refills: 0 | Status: SHIPPED | OUTPATIENT
Start: 2020-08-03 | End: 2020-09-04

## 2020-08-03 NOTE — TELEPHONE ENCOUNTER
Routing refill request to provider for review/approval because:  Labs overdue  Creatinine   Date Value Ref Range Status   03/07/2019 0.60 0.52 - 1.04 mg/dL Final     Potassium   Date Value Ref Range Status   03/07/2019 4.3 3.4 - 5.3 mmol/L Final     Sodium   Date Value Ref Range Status   03/07/2019 140 133 - 144 mmol/L Final     Liyah RICHARD RN,BSN

## 2020-09-04 ENCOUNTER — OFFICE VISIT (OUTPATIENT)
Dept: FAMILY MEDICINE | Facility: CLINIC | Age: 65
End: 2020-09-04
Payer: COMMERCIAL

## 2020-09-04 VITALS
HEIGHT: 58 IN | DIASTOLIC BLOOD PRESSURE: 84 MMHG | SYSTOLIC BLOOD PRESSURE: 131 MMHG | BODY MASS INDEX: 31.23 KG/M2 | OXYGEN SATURATION: 98 % | HEART RATE: 62 BPM | WEIGHT: 148.8 LBS | TEMPERATURE: 97.1 F

## 2020-09-04 DIAGNOSIS — E78.5 HYPERLIPIDEMIA LDL GOAL <130: ICD-10-CM

## 2020-09-04 DIAGNOSIS — K21.9 GASTROESOPHAGEAL REFLUX DISEASE WITHOUT ESOPHAGITIS: ICD-10-CM

## 2020-09-04 DIAGNOSIS — E11.9 TYPE 2 DIABETES MELLITUS WITHOUT COMPLICATION, WITHOUT LONG-TERM CURRENT USE OF INSULIN (H): ICD-10-CM

## 2020-09-04 DIAGNOSIS — Z00.00 ROUTINE GENERAL MEDICAL EXAMINATION AT A HEALTH CARE FACILITY: Primary | ICD-10-CM

## 2020-09-04 DIAGNOSIS — E55.9 VITAMIN D DEFICIENCY: ICD-10-CM

## 2020-09-04 DIAGNOSIS — E66.9 NON MORBID OBESITY, UNSPECIFIED OBESITY TYPE: ICD-10-CM

## 2020-09-04 DIAGNOSIS — I10 BENIGN ESSENTIAL HYPERTENSION: ICD-10-CM

## 2020-09-04 LAB
ALBUMIN SERPL-MCNC: 3.4 G/DL (ref 3.4–5)
ALP SERPL-CCNC: 87 U/L (ref 40–150)
ALT SERPL W P-5'-P-CCNC: 18 U/L (ref 0–50)
ANION GAP SERPL CALCULATED.3IONS-SCNC: 6 MMOL/L (ref 3–14)
AST SERPL W P-5'-P-CCNC: 14 U/L (ref 0–45)
BILIRUB SERPL-MCNC: 0.5 MG/DL (ref 0.2–1.3)
BUN SERPL-MCNC: 17 MG/DL (ref 7–30)
CALCIUM SERPL-MCNC: 9.3 MG/DL (ref 8.5–10.1)
CHLORIDE SERPL-SCNC: 105 MMOL/L (ref 94–109)
CHOLEST SERPL-MCNC: 191 MG/DL
CO2 SERPL-SCNC: 28 MMOL/L (ref 20–32)
CREAT SERPL-MCNC: 0.62 MG/DL (ref 0.52–1.04)
CREAT UR-MCNC: 212 MG/DL
ERYTHROCYTE [DISTWIDTH] IN BLOOD BY AUTOMATED COUNT: 14.2 % (ref 10–15)
GFR SERPL CREATININE-BSD FRML MDRD: >90 ML/MIN/{1.73_M2}
GLUCOSE SERPL-MCNC: 110 MG/DL (ref 70–99)
HBA1C MFR BLD: 5.9 % (ref 0–5.6)
HCT VFR BLD AUTO: 37.5 % (ref 35–47)
HDLC SERPL-MCNC: 61 MG/DL
HGB BLD-MCNC: 12 G/DL (ref 11.7–15.7)
LDLC SERPL CALC-MCNC: 103 MG/DL
MCH RBC QN AUTO: 28.7 PG (ref 26.5–33)
MCHC RBC AUTO-ENTMCNC: 32 G/DL (ref 31.5–36.5)
MCV RBC AUTO: 90 FL (ref 78–100)
MICROALBUMIN UR-MCNC: 25 MG/L
MICROALBUMIN/CREAT UR: 11.65 MG/G CR (ref 0–25)
NONHDLC SERPL-MCNC: 130 MG/DL
PLATELET # BLD AUTO: 247 10E9/L (ref 150–450)
POTASSIUM SERPL-SCNC: 4.3 MMOL/L (ref 3.4–5.3)
PROT SERPL-MCNC: 7.4 G/DL (ref 6.8–8.8)
RBC # BLD AUTO: 4.18 10E12/L (ref 3.8–5.2)
SODIUM SERPL-SCNC: 139 MMOL/L (ref 133–144)
TRIGL SERPL-MCNC: 133 MG/DL
WBC # BLD AUTO: 7.3 10E9/L (ref 4–11)

## 2020-09-04 PROCEDURE — 99207 C FOOT EXAM  NO CHARGE: CPT | Mod: 25 | Performed by: INTERNAL MEDICINE

## 2020-09-04 PROCEDURE — 82306 VITAMIN D 25 HYDROXY: CPT | Performed by: INTERNAL MEDICINE

## 2020-09-04 PROCEDURE — 80053 COMPREHEN METABOLIC PANEL: CPT | Performed by: INTERNAL MEDICINE

## 2020-09-04 PROCEDURE — 36415 COLL VENOUS BLD VENIPUNCTURE: CPT | Performed by: INTERNAL MEDICINE

## 2020-09-04 PROCEDURE — 99213 OFFICE O/P EST LOW 20 MIN: CPT | Mod: 25 | Performed by: INTERNAL MEDICINE

## 2020-09-04 PROCEDURE — 80061 LIPID PANEL: CPT | Performed by: INTERNAL MEDICINE

## 2020-09-04 PROCEDURE — 82043 UR ALBUMIN QUANTITATIVE: CPT | Performed by: INTERNAL MEDICINE

## 2020-09-04 PROCEDURE — 83036 HEMOGLOBIN GLYCOSYLATED A1C: CPT | Performed by: INTERNAL MEDICINE

## 2020-09-04 PROCEDURE — 99396 PREV VISIT EST AGE 40-64: CPT | Performed by: INTERNAL MEDICINE

## 2020-09-04 PROCEDURE — 85027 COMPLETE CBC AUTOMATED: CPT | Performed by: INTERNAL MEDICINE

## 2020-09-04 RX ORDER — IRBESARTAN 300 MG/1
300 TABLET ORAL AT BEDTIME
Qty: 90 TABLET | Refills: 3 | Status: SHIPPED | OUTPATIENT
Start: 2020-09-04 | End: 2021-05-20

## 2020-09-04 RX ORDER — SIMVASTATIN 40 MG
40 TABLET ORAL AT BEDTIME
Qty: 90 TABLET | Refills: 3 | Status: SHIPPED | OUTPATIENT
Start: 2020-09-04 | End: 2021-05-20

## 2020-09-04 RX ORDER — HYDROCHLOROTHIAZIDE 12.5 MG/1
12.5 TABLET ORAL DAILY
Qty: 90 TABLET | Refills: 3 | Status: SHIPPED | OUTPATIENT
Start: 2020-09-04 | End: 2021-05-20

## 2020-09-04 ASSESSMENT — MIFFLIN-ST. JEOR: SCORE: 1119.7

## 2020-09-04 NOTE — PROGRESS NOTES
SUBJECTIVE:   CC: Teresa Herron is an 65 year old woman who presents for preventive health visit.     Healthy Habits:    Getting at least 3 servings of Calcium per day:  Yes    Bi-annual eye exam:  Yes    Dental care twice a year:  Yes    Sleep apnea or symptoms of sleep apnea:  None    Diet:  Regular (no restrictions)    Frequency of exercise:  None    Duration of exercise:  Less than 15 minutes    Taking medications regularly:  Yes    Barriers to taking medications:  None    Medication side effects:  None    PHQ-2 Total Score:    Additional concerns today:  No          Today's PHQ-2 Score:   PHQ-2 ( 1999 Pfizer) 4/2/2020   Q1: Little interest or pleasure in doing things 0   Q2: Feeling down, depressed or hopeless 0   PHQ-2 Score 0       Abuse: Current or Past (Physical, Sexual or Emotional) - No  Do you feel safe in your environment? Yes    Have you ever done Advance Care Planning? (For example, a Health Directive, POLST, or a discussion with a medical provider or your loved ones about your wishes): No, advance care planning information given to patient to review.  Patient plans to discuss their wishes with loved ones or provider.       The patient is doing well, has diabetes and not on meds, up to date eye exam, no sores on feet or toes.  She feels fine otherwise.  Had a fall at work last year and some issues related to that but otherwise no c/o.  Not working out a lot.               Past Medical History:      Past Medical History:   Diagnosis Date     Arthritis of knee, degenerative     seen by Dr. Jones 2017 and cortisone did not help     Chest pain 4/15    nl est echo     Dizziness years     GERD (gastroesophageal reflux disease)      H/O colonoscopy 2014    tics, tortuous, fu 5 years     HTN (hypertension) 1988     Hypercholesteraemia 2010     Type II or unspecified type diabetes mellitus without mention of complication, not stated as uncontrolled     not on meds as of 2018     Vitamin D deficiency               Past Surgical History:      Past Surgical History:   Procedure Laterality Date     COLONOSCOPY N/A 11/18/2014    Procedure: COLONOSCOPY;  Surgeon: Yoni Carvajal MD;  Location:  GI     fibroid surgery  2000     HYSTERECTOMY, PAP NO LONGER INDICATED  2006    estrada/bso due to pain and bleeding     LAPAROSCOPIC APPENDECTOMY  5/19/2014    Procedure: LAPAROSCOPIC APPENDECTOMY;  Surgeon: Will Barth MD;  [facility]             Social History:     Social History     Socioeconomic History     Marital status:      Spouse name: Not on file     Number of children: 1     Years of education: Not on file     Highest education level: Not on file   Occupational History     Occupation: work dietary, in kitchen     Employer: [employer]   Social Needs     Financial resource strain: Not on file     Food insecurity     Worry: Not on file     Inability: Not on file     Transportation needs     Medical: Not on file     Non-medical: Not on file   Tobacco Use     Smoking status: Never Smoker     Smokeless tobacco: Never Used   Substance and Sexual Activity     Alcohol use: No     Alcohol/week: 0.0 standard drinks     Drug use: No     Sexual activity: Not Currently   Lifestyle     Physical activity     Days per week: Not on file     Minutes per session: Not on file     Stress: Not on file   Relationships     Social connections     Talks on phone: Not on file     Gets together: Not on file     Attends Protestant service: Not on file     Active member of club or organization: Not on file     Attends meetings of clubs or organizations: Not on file     Relationship status: Not on file     Intimate partner violence     Fear of current or ex partner: Not on file     Emotionally abused: Not on file     Physically abused: Not on file     Forced sexual activity: Not on file   Other Topics Concern     Not on file   Social History Narrative     Not on file             Family History:   reviewed       "   Allergies:   No Known Allergies          Medications:     Current Outpatient Medications   Medication Sig Dispense Refill     aspirin 81 MG EC tablet Take 1 tablet (81 mg) by mouth daily 90 tablet 3     hydrochlorothiazide (HYDRODIURIL) 12.5 MG tablet Take 1 tablet (12.5 mg) by mouth daily 90 tablet 3     irbesartan (AVAPRO) 300 MG tablet Take 1 tablet (300 mg) by mouth At Bedtime 90 tablet 3     omega 3 1000 MG CAPS Take 1 g by mouth daily 90 capsule      simvastatin (ZOCOR) 40 MG tablet Take 1 tablet (40 mg) by mouth At Bedtime 90 tablet 3               Review of Systems:   The 10 point Review of Systems is negative other than noted in the HPI           Physical Exam:   Blood pressure 131/84, pulse 62, temperature 97.1  F (36.2  C), temperature source Temporal, height 1.473 m (4' 10\"), weight 67.5 kg (148 lb 12.8 oz), SpO2 98 %, not currently breastfeeding.    Exam:  Constitutional: healthy appearing, alert and in no distress  Heent: Normocephalic. Head without obvious masses or lesions. PERRLDC, EOMI. Mouth exam within normal limits: tongue, mucous membranes, posterior pharynx all normal, no lesions or abnormalities seen.  Tm's and canals within normal limits bilaterally. Neck supple, no nuchal rigidity or masses. No supraclavicular, or cervical adenopathy. Thyroid symmetric, no masses.  Cardiovascular: Regular rate and rhythm, no murmer, rub or gallops.  JVP not elevated, no edema.  Carotids within normal limits bilaterally, no bruits.  Respiratory: Normal respiratory effort.  Lungs clear, normal flow, no wheezing or crackles.  Breasts: Normal bilaterally.  No masses or lesions.  Nipples within normal limits.  No axillary lesions or nodes.  My M.A. Was present during this part of the examination.  Gastrointestinal: Normal active bowel sounds.   Soft, not tender, no masses, guarding or rebound.  No hepatosplenomegaly.   Musculoskeletal: extremities normal, no gross deformities noted.  Skin: no suspicious " lesions or rashes   Neurologic: Mental status within normal limits.  Speech fluent.  No gross motor abnormalities and gait intact.  Psychiatric: mentation appears normal and affect normal.         Data:   Labs sent        Assessment:   1. Normal complete physical exam  2. Niddm, no c/o, not on meds, follow up labs, up to date eye exam, foot exam within normal limits today  3. Elevated cholesterol on statin  4. Hypertension, controlled  5. Elevated cholesterol on statin  6. Vit d defic, to take 2000 unites a day  7. Gerd, controlled with prn tums  8. hcm         Plan:   Up to date immunizations and colon  To get dexa and mammogram  Exercise, diet  Letter with labs      Yoni Baxter M.D.

## 2020-09-04 NOTE — LETTER
September 8, 2020      Teresa Herron  [address]   [place] [facility] [place] [address]        Dear ,    It was a pleasure seeing you for your physical examination.  I wanted to get back to you with your test results.  I have enclosed a copy for your review.     I am happy to report that your cbc or complete blood count is normal with no signs of anemia, leukemia or platelet abnormalities. Your chemistry panel shows the sugar to be well controlled with a hemoglobin a1c of 5.9.  Your blood salts, kidney tests, liver tests, and proteins are all fine.     Your total cholesterol is 191 with the normal range being below 200.  Your HDL or good cholesterol is 61 with the normal range being above 50.  Your LDL or bad cholesterol is 103 with the normal range being below 130.  I would like to see the ldl or bad cholesterol a bit lower so please try to exercise and get your weight down a bit for this.     I am happy to bring you this overall excellent report.  If you have any questions please call me.     Resulted Orders   Albumin Random Urine Quantitative with Creat Ratio   Result Value Ref Range    Creatinine Urine 212 mg/dL    Albumin Urine mg/L 25 mg/L    Albumin Urine mg/g Cr 11.65 0 - 25 mg/g Cr   HEMOGLOBIN A1C   Result Value Ref Range    Hemoglobin A1C 5.9 (H) 0 - 5.6 %      Comment:      Normal <5.7% Prediabetes 5.7-6.4%  Diabetes 6.5% or higher - adopted from ADA   consensus guidelines.     Lipid panel reflex to direct LDL Fasting   Result Value Ref Range    Cholesterol 191 <200 mg/dL    Triglycerides 133 <150 mg/dL      Comment:      Fasting specimen    HDL Cholesterol 61 >49 mg/dL    LDL Cholesterol Calculated 103 (H) <100 mg/dL      Comment:      Above desirable:  100-129 mg/dl  Borderline High:  130-159 mg/dL  High:             160-189 mg/dL  Very high:       >189 mg/dl      Non HDL Cholesterol 130 (H) <130 mg/dL      Comment:      Above Desirable:  130-159 mg/dl  Borderline high:  160-189  mg/dl  High:             190-219 mg/dl  Very high:       >219 mg/dl     CBC with platelets   Result Value Ref Range    WBC 7.3 4.0 - 11.0 10e9/L    RBC Count 4.18 3.8 - 5.2 10e12/L    Hemoglobin 12.0 11.7 - 15.7 g/dL    Hematocrit 37.5 35.0 - 47.0 %    MCV 90 78 - 100 fl    MCH 28.7 26.5 - 33.0 pg    MCHC 32.0 31.5 - 36.5 g/dL    RDW 14.2 10.0 - 15.0 %    Platelet Count 247 150 - 450 10e9/L   Comprehensive metabolic panel   Result Value Ref Range    Sodium 139 133 - 144 mmol/L    Potassium 4.3 3.4 - 5.3 mmol/L    Chloride 105 94 - 109 mmol/L    Carbon Dioxide 28 20 - 32 mmol/L    Anion Gap 6 3 - 14 mmol/L    Glucose 110 (H) 70 - 99 mg/dL      Comment:      Fasting specimen    Urea Nitrogen 17 7 - 30 mg/dL    Creatinine 0.62 0.52 - 1.04 mg/dL    GFR Estimate >90 >60 mL/min/[1.73_m2]      Comment:      Non  GFR Calc  Starting 12/18/2018, serum creatinine based estimated GFR (eGFR) will be   calculated using the Chronic Kidney Disease Epidemiology Collaboration   (CKD-EPI) equation.      GFR Estimate If Black >90 >60 mL/min/[1.73_m2]      Comment:       GFR Calc  Starting 12/18/2018, serum creatinine based estimated GFR (eGFR) will be   calculated using the Chronic Kidney Disease Epidemiology Collaboration   (CKD-EPI) equation.      Calcium 9.3 8.5 - 10.1 mg/dL    Bilirubin Total 0.5 0.2 - 1.3 mg/dL    Albumin 3.4 3.4 - 5.0 g/dL    Protein Total 7.4 6.8 - 8.8 g/dL    Alkaline Phosphatase 87 40 - 150 U/L    ALT 18 0 - 50 U/L    AST 14 0 - 45 U/L

## 2020-09-07 NOTE — RESULT ENCOUNTER NOTE
It was a pleasure seeing you for your physical examination.  I wanted to get back to you with your test results.  I have enclosed a copy for your review.     I am happy to report that your cbc or complete blood count is normal with no signs of anemia, leukemia or platelet abnormalities. Your chemistry panel shows the sugar to be well controlled with a hemoglobin a1c of 5.9.  Your blood salts, kidney tests, liver tests, and proteins are all fine.    Your total cholesterol is 191 with the normal range being below 200.  Your HDL or good cholesterol is 61 with the normal range being above 50.  Your LDL or bad cholesterol is 103 with the normal range being below 130.  I would like to see the ldl or bad cholesterol a bit lower so please try to exercise and get your weight down a bit for this.    I am happy to bring you this overall excellent report.  If you have any questions please call me.

## 2020-09-08 LAB — DEPRECATED CALCIDIOL+CALCIFEROL SERPL-MC: 31 UG/L (ref 20–75)

## 2020-10-02 ENCOUNTER — HOSPITAL ENCOUNTER (OUTPATIENT)
Dept: MAMMOGRAPHY | Facility: CLINIC | Age: 65
End: 2020-10-02
Attending: INTERNAL MEDICINE
Payer: COMMERCIAL

## 2020-10-02 ENCOUNTER — HOSPITAL ENCOUNTER (OUTPATIENT)
Dept: BONE DENSITY | Facility: CLINIC | Age: 65
End: 2020-10-02
Attending: INTERNAL MEDICINE
Payer: COMMERCIAL

## 2020-10-02 DIAGNOSIS — Z00.00 ROUTINE GENERAL MEDICAL EXAMINATION AT A HEALTH CARE FACILITY: ICD-10-CM

## 2020-10-02 PROCEDURE — 77063 BREAST TOMOSYNTHESIS BI: CPT

## 2020-10-02 PROCEDURE — 77080 DXA BONE DENSITY AXIAL: CPT

## 2020-10-13 ENCOUNTER — APPOINTMENT (OUTPATIENT)
Dept: GENERAL RADIOLOGY | Facility: CLINIC | Age: 65
End: 2020-10-13
Attending: PHYSICIAN ASSISTANT
Payer: COMMERCIAL

## 2020-10-13 ENCOUNTER — HOSPITAL ENCOUNTER (EMERGENCY)
Facility: CLINIC | Age: 65
Discharge: HOME | End: 2020-10-13
Attending: PHYSICIAN ASSISTANT | Admitting: PHYSICIAN ASSISTANT
Payer: COMMERCIAL

## 2020-10-13 VITALS
SYSTOLIC BLOOD PRESSURE: 117 MMHG | OXYGEN SATURATION: 95 % | DIASTOLIC BLOOD PRESSURE: 81 MMHG | RESPIRATION RATE: 17 BRPM | HEART RATE: 58 BPM | BODY MASS INDEX: 31.49 KG/M2 | HEIGHT: 58 IN | TEMPERATURE: 100 F | WEIGHT: 150 LBS

## 2020-10-13 DIAGNOSIS — M79.10 MYALGIA: ICD-10-CM

## 2020-10-13 DIAGNOSIS — Z20.822 SUSPECTED COVID-19 VIRUS INFECTION: ICD-10-CM

## 2020-10-13 DIAGNOSIS — R07.9 CHEST PAIN: ICD-10-CM

## 2020-10-13 LAB
ALBUMIN SERPL-MCNC: 3.5 G/DL (ref 3.4–5)
ALP SERPL-CCNC: 88 U/L (ref 40–150)
ALT SERPL W P-5'-P-CCNC: 21 U/L (ref 0–50)
ANION GAP SERPL CALCULATED.3IONS-SCNC: 3 MMOL/L (ref 3–14)
AST SERPL W P-5'-P-CCNC: 28 U/L (ref 0–45)
BASOPHILS # BLD AUTO: 0 10E9/L (ref 0–0.2)
BASOPHILS NFR BLD AUTO: 0.4 %
BILIRUB SERPL-MCNC: 0.5 MG/DL (ref 0.2–1.3)
BUN SERPL-MCNC: 11 MG/DL (ref 7–30)
CALCIUM SERPL-MCNC: 9.1 MG/DL (ref 8.5–10.1)
CHLORIDE SERPL-SCNC: 106 MMOL/L (ref 94–109)
CO2 SERPL-SCNC: 29 MMOL/L (ref 20–32)
CREAT SERPL-MCNC: 0.59 MG/DL (ref 0.52–1.04)
D DIMER PPP FEU-MCNC: 0.5 UG/ML FEU (ref 0–0.5)
DIFFERENTIAL METHOD BLD: NORMAL
EOSINOPHIL # BLD AUTO: 0 10E9/L (ref 0–0.7)
EOSINOPHIL NFR BLD AUTO: 0 %
ERYTHROCYTE [DISTWIDTH] IN BLOOD BY AUTOMATED COUNT: 13.9 % (ref 10–15)
GFR SERPL CREATININE-BSD FRML MDRD: >90 ML/MIN/{1.73_M2}
GLUCOSE SERPL-MCNC: 90 MG/DL (ref 70–99)
HCT VFR BLD AUTO: 40.8 % (ref 35–47)
HGB BLD-MCNC: 13.1 G/DL (ref 11.7–15.7)
IMM GRANULOCYTES # BLD: 0 10E9/L (ref 0–0.4)
IMM GRANULOCYTES NFR BLD: 0 %
INTERPRETATION ECG - MUSE: NORMAL
LYMPHOCYTES # BLD AUTO: 1.3 10E9/L (ref 0.8–5.3)
LYMPHOCYTES NFR BLD AUTO: 26.6 %
MCH RBC QN AUTO: 28.9 PG (ref 26.5–33)
MCHC RBC AUTO-ENTMCNC: 32.1 G/DL (ref 31.5–36.5)
MCV RBC AUTO: 90 FL (ref 78–100)
MONOCYTES # BLD AUTO: 0.7 10E9/L (ref 0–1.3)
MONOCYTES NFR BLD AUTO: 15 %
NEUTROPHILS # BLD AUTO: 2.7 10E9/L (ref 1.6–8.3)
NEUTROPHILS NFR BLD AUTO: 58 %
NRBC # BLD AUTO: 0 10*3/UL
NRBC BLD AUTO-RTO: 0 /100
PLATELET # BLD AUTO: 254 10E9/L (ref 150–450)
POTASSIUM SERPL-SCNC: 3.8 MMOL/L (ref 3.4–5.3)
PROT SERPL-MCNC: 8 G/DL (ref 6.8–8.8)
RBC # BLD AUTO: 4.54 10E12/L (ref 3.8–5.2)
SODIUM SERPL-SCNC: 138 MMOL/L (ref 133–144)
TROPONIN I SERPL-MCNC: <0.015 UG/L (ref 0–0.04)
WBC # BLD AUTO: 4.7 10E9/L (ref 4–11)

## 2020-10-13 PROCEDURE — 71045 X-RAY EXAM CHEST 1 VIEW: CPT

## 2020-10-13 PROCEDURE — 85025 COMPLETE CBC W/AUTO DIFF WBC: CPT | Performed by: PHYSICIAN ASSISTANT

## 2020-10-13 PROCEDURE — 250N000013 HC RX MED GY IP 250 OP 250 PS 637: Performed by: PHYSICIAN ASSISTANT

## 2020-10-13 PROCEDURE — 80053 COMPREHEN METABOLIC PANEL: CPT | Performed by: PHYSICIAN ASSISTANT

## 2020-10-13 PROCEDURE — 84484 ASSAY OF TROPONIN QUANT: CPT | Performed by: PHYSICIAN ASSISTANT

## 2020-10-13 PROCEDURE — 99285 EMERGENCY DEPT VISIT HI MDM: CPT | Mod: 25

## 2020-10-13 PROCEDURE — 85379 FIBRIN DEGRADATION QUANT: CPT | Performed by: PHYSICIAN ASSISTANT

## 2020-10-13 PROCEDURE — 93005 ELECTROCARDIOGRAM TRACING: CPT

## 2020-10-13 RX ORDER — ACETAMINOPHEN 500 MG
1000 TABLET ORAL ONCE
Status: COMPLETED | OUTPATIENT
Start: 2020-10-13 | End: 2020-10-13

## 2020-10-13 RX ADMIN — ACETAMINOPHEN 1000 MG: 500 TABLET, FILM COATED ORAL at 13:16

## 2020-10-13 ASSESSMENT — MIFFLIN-ST. JEOR: SCORE: 1125.15

## 2020-10-13 ASSESSMENT — ENCOUNTER SYMPTOMS
COUGH: 0
MYALGIAS: 1
HEADACHES: 1
SHORTNESS OF BREATH: 0
DIARRHEA: 0
NAUSEA: 0
FEVER: 1
VOMITING: 0

## 2020-10-13 NOTE — ED PROVIDER NOTES
History     Chief Complaint:  Chest Pain    HPI   Teresa Herron is a 65 year old female with a history of hypertension, hyperlipidemia, and type 2 diabetes who presents with fever, chest tightness, myalgias.  Patient notes that she has had diffuse body aches and low-grade fever for the last several days.  Yesterday she had some chest tightness as was intermittent for a few minutes at a time.  She described this as a sharp pain in the center of the chest.  It did not radiate, and was not brought on by exertion or any other factor.  She denies nausea or vomiting.  She endorses a low-grade headache but denies neck stiffness or confusion.  No sore throat.  She has not had a cough but has felt a little bit short of breath.  No leg swelling or prior history of DVT/PE.  No diarrhea or abdominal pain or dysuria.  No rashes or tick bites.  Does not have a history of heart valve problems and denies IV drug use.  She has a history of hypertension, diabetes, and dyslipidemia but denies smoking, family history of early CAD    Allergies:  No known drug allergies    Medications:    Metformin  Zocor  Crestor   Prilosec  Lisinopril   Hydrodiuril   Aspirin 81mg     Past Medical History:    Arthritis   GERD  Hypertension   Type 2 diabetes   Hyperlipidemia   DJD    Past Surgical History:    Fibroid surgery   Hysterectomy   Laparoscopic appendectomy     Family History:    Father: coronary artery disease   Mother:  Diabetes, hyperlipidemia, hypertension  Brother: diabetes   Sister: diabetes     Social History:  Smoking status: no  Alcohol use: no  Drug use: no  The patient presents to the emergency department alone   Yoni Baxter   Marital Status:   [2]    Review of Systems   Constitutional: Positive for fever.   Respiratory: Negative for cough and shortness of breath.    Cardiovascular: Positive for chest pain.   Gastrointestinal: Negative for diarrhea, nausea and vomiting.   Musculoskeletal: Positive for myalgias.  "  Neurological: Positive for headaches.   All other systems reviewed and are negative.    Physical Exam     Patient Vitals for the past 24 hrs:   BP Temp Temp src Pulse Resp SpO2 Height Weight   10/13/20 1536 117/81 -- -- -- -- -- -- --   10/13/20 1530 -- -- -- 58 17 95 % -- --   10/13/20 1500 121/81 -- -- 60 24 95 % -- --   10/13/20 1430 -- -- -- 62 19 97 % -- --   10/13/20 1400 120/71 -- -- 65 25 98 % -- --   10/13/20 1320 -- -- -- 69 (!) 32 99 % -- --   10/13/20 1319 (!) 146/85 -- -- 68 -- -- -- --   10/13/20 1148 (!) 165/100 100  F (37.8  C) Oral 85 16 98 % 1.473 m (4' 10\") 68 kg (150 lb)       Physical Exam  General: Alert and cooperative with exam. Resting comfortably on gurney  Head:  Scalp is NC/AT  Eyes:  Conjunctiva normal, PERRL  ENT:  The external nose and ears are normal.   Neck:  Normal range of motion without rigidity.  CV:  Regular rate and rhythm    No pathologic murmur, rubs, or gallops.  Resp:  Breath sounds are clear bilaterally.  No crackles, wheezes, rhonchi, stridor.    Non-labored, no retractions or accessory muscle use  Abdomen: Abdomen is soft, no distension, no tenderness, no masses. No peritoneal signs. No CVA tenderness.  MS:  No lower extremity edema or asymmetric calf swelling. Normal ROM in all joints without effusions.    No midline cervical, thoracic, or lumbar tenderness  Skin:  Warm and dry, No rash or lesions noted. 2+ peripheral pulses in all extremities  Neuro: Alert and oriented x3.  No gross motor deficits.  No facial asymmetry.  Psych: Awake. Alert. Normal affect. Appropriate interactions.      Emergency Department Course   ECG (11:51:49):  Rate 85 bpm. RI interval 136. QRS duration 78. QT/QTc 328/390. P-R-T axes 62 2 14. Normal sinus rhythm.nonspecific ST and T wave abnormality, Abnormal ECG. No significant change when compared to EKG dated 05/19/14. Interpreted at 1305 by Davey Kapoor MD.    Imaging:  Radiology findings were communicated with the patient who voiced " understanding of the findings.    XR Chest Port 1 View  IMPRESSION: Mild elevation of the right hemidiaphragm. Otherwise  negative chest. No change from previous.  As read by Radiology.      Laboratory:  Laboratory findings were communicated with the patient who voiced understanding of the findings.    D-dimer: 0.5    Troponin I (1315): <0.015    CMP: (Creatinine 0.59)    CBC: WNL (WBC 4.7, HGB 13.1, )    Interventions:  1316 Tylenol 1,000 mg PO    Emergency Department Course:  Past medical records, nursing notes, and vitals reviewed.  1251: I performed an exam of the patient and obtained history, as documented above.     EKG was obtained and reviewed in the emergency department.    IV inserted and blood drawn.    The patient was sent for a chest XR while in the emergency department, results above.     1517: I rechecked the patient. I reviewed the results with the Patient and answered all related questions prior to discharge.     Findings and plan explained to the Patient. Patient discharged home with instructions regarding supportive care, medications, and reasons to return. The importance of close follow-up was reviewed.   Impression & Plan   Covid-19  Teresa Herron was evaluated during a global COVID-19 pandemic, which necessitated consideration that the patient might be at risk for infection with the SARS-CoV-2 virus that causes COVID-19.   Applicable protocols for evaluation were followed during the patient's care.   COVID-19 was considered as part of the patient's evaluation. The plan for testing is:  a test was obtained at a previous visit and reviewed & considered today.    Medical Decision Makin-year-old female who presents with chest pain and fever.  Broad differential considered.  EKG shows no evidence of acute ischemia, arrhythmia, or pericarditis and is not significantly changed from prior.  Troponin is undetectable despite symptoms greater than 6 hours ago, no ongoing chest pain and  doubt ACS.  The patient is a heart score of 3 (history 0, EKG 0, age 1, risk factors 2, troponin 0).  No evidence of myocarditis at this time.  Chest x-ray clear with no evidence of pneumonia, pneumothorax, or cardiomegaly, or mediastinal widening.  D-dimer undetectable and not high risk by Wells criteria essentially ruling out pulmonary embolism.  Patient is afebrile with normal white blood cell count and no tachycardia and no evidence of sepsis or other serious bacterial infection at this time.  Abdominal examination is benign and non-tender.  COVID-19 is in the differential, with test at outside facility pending and patient declines repeat test today.  No respiratory distress, no pneumonia on CT, no indication for hospitalization at this time.  Plan will be close follow-up with primary care provider, strict return precautions for any new or worsening symptoms including return of chest pain, shortness of breath, weakness, among others.    Diagnosis:    ICD-10-CM    1. Chest pain  R07.9    2. Myalgia  M79.10    3. Suspected COVID-19 virus infection  Z20.828        Disposition:  Discharged to home.    Lucrecia Maharaj  10/13/2020   [facility] EMERGENCY DEPT  Scribe Disclosure:  I, Lucrecia Maharaj, am serving as a scribe at 12:51 PM on 10/13/2020 to document services personally performed by Louie Guevara PA-C based on my observations and the provider's statements to me.        Louie Guevara PA-C  10/13/20 [id number]

## 2020-10-13 NOTE — DISCHARGE INSTRUCTIONS
Discharge Instructions  Chest Pain    You have been seen today for chest pain or discomfort.  At this time, your provider has found no signs that your chest pain is due to a serious or life-threatening condition, (or you have declined more testing and/or admission to the hospital). However, sometimes there is a serious problem that does not show up right away. Your evaluation today may not be complete and you may need further testing and evaluation.     Generally, every Emergency Department visit should have a follow-up clinic visit with either a primary or a specialty clinic/provider. Please follow-up as instructed by your emergency provider today.  Return to the Emergency Department if:  Your chest pain changes, gets worse, starts to happen more often, or comes with less activity.  You are newly short of breath.  You get very weak or tired.  You pass out or faint.  You have any new symptoms, like fever, cough, numb legs, or you cough up blood.  You have anything else that worries you.    Until you follow-up with your regular provider, please do the following:  Take one aspirin daily unless you have an allergy or are told not to by your provider.  If a stress test appointment has been made, go to the appointment.  If you have questions, contact your regular provider.  Follow-up with your regular provider/clinic as directed; this is very important.    If you were given a prescription for medicine here today, be sure to read all of the information (including the package insert) that comes with your prescription.  This will include important information about the medicine, its side effects, and any warnings that you need to know about.  The pharmacist who fills the prescription can provide more information and answer questions you may have about the medicine.  If you have questions or concerns that the pharmacist cannot address, please call or return to the Emergency Department.       Remember that you can always come  back to the Emergency Department if you are not able to see your regular provider in the amount of time listed above, if you get any new symptoms, or if there is anything that worries you.  Discharge Instructions  COVID-19    COVID-19 is the disease caused by a new coronavirus. The virus spreads from person-to-person primarily by droplets when an infected person coughs or sneezes and the droplet either lands on another person or that other person touches a surface with the droplet on it. There are tests available to diagnose COVID-19. There is no specific treatment or medicine for the disease.    You may have been diagnosed with COVID, may be being tested for COVID and have a pending test result, or may have been exposed to COVID.    Symptoms of COVID-19  Many people have no symptoms or mild symptoms.  Symptoms may usually appear 4 to 5 days (up to 14 days) after contact with a person with COVID-19. Some people will get severe symptoms and pneumonia. Usual symptoms are:     ? Fever  ? Cough  ? Trouble breathing    Less common symptoms are: Headache, body aches, sore throat, sneezing, diarrhea,loss of taste or smell.    Isolation and Quarantine    You were seen because you have symptoms, had an exposure, or had some other concern about possible COVID. The best way to stop the spread of the virus is to avoid contact with others.  Isolation refers to sick people staying away from people who are not sick. A person in quarantine is limiting activity because they were exposed and are waiting to see if they might become sick.    If you test positive for COVID, you should stay home (isolation) for at least 10 days after your symptoms began, and for 24 hours with no fever and improvement of symptoms--whichever is longer. (Your fever should be gone for 24 hours without using fever-reducing medicine). If you have no symptoms, you should stay home (isolation) for 10 days from the day of the test.    For example, if you have a  fever and cough for 6 days, you need to stay home 4 more days with no fever for a total of 10 days. Or, if you have a fever and cough for 10 days, you need to stay home one more day with no fever for a total of 11 days.    If you have a high-risk exposure to COVID (you spent 15 minutes or more within six feet of somebody who has COVID), you should stay home (quarantine) for 14 days. Even if you test negative for COVID, the CDC recommends a 14-day quarantine from the time of your last exposure to that individual.    If you have symptoms but a negative test, you should stay at home until you are symptom-free and without fever for 24 hours, using the same judgment you would for when it is safe to return to work/school from strep throat, influenza, or the common cold. If you worsen, you should consider being re-evaluated.    If you are being tested for COVID and your test is pending, you should stay home until you know your test result.    How should I protect myself and others?    Do not go to work or school. Have a friend or relative do your shopping. Do not use public transportation (bus, train) or ridesharing (Lyft, Uber).    Separate yourself from other people in your home.?As much as possible, you should stay in one room and away from other people in your home. Also, use a separate bathroom, if possible. Avoid handling pets or other animals while sick.     Wear a facemask if you need to be around other people and cover your mouth and nose with a tissue when you cough or sneeze.     Avoid sharing personal household items. You should not share dishes, drinking glasses, forks/knives/spoons, towels, or bedding with other people in your home. After using these items, they should be washed with soap and water. Clean parts of your home that are touched often (doorknobs, faucets, countertops, etc.) daily.     Wash your hands often with soap and water for at least 20 seconds or use an alcohol-based hand   containing at least 60% alcohol.     Avoid touching your face.    Treat your symptoms. You can take Acetaminophen (Tylenol) to treat body aches and fever as needed for comfort. Ibuprofen (Advil or Motrin) can be used as well if you still have symptoms after taking Tylenol. Drink fluids. Rest.    Watch for worsening symptoms such as shortness of breath/difficulty breathing or very severe weakness.    Employers/workplaces are being asked by the Centers for Disease Control (CDC) to not request notes/documentation for you to return to work or prove that you were ill. You may choose to show your employer this paperwork. Also, repeat testing should not be required to return to work.    Return to the Emergency Department if:    If you are developing worsening breathing, shortness of breath, or feel worse you should seek medical attention.  If you are uncertain, contact your health care provider/clinic. If you need emergency medical attention, call 911 and tell them you have been ill.

## 2020-10-13 NOTE — ED TRIAGE NOTES
Pt reports episode of chest pain yesterday around 1330. Now having generalized body aches, headache and fever. COVID test pending.

## 2020-10-13 NOTE — ED AVS SNAPSHOT
[facility]  [address] [address]  Phone: [phone]  Fax: [phone]                                    Teresa Herron   MRN: [id number]    Department: [facility]   Date of Visit: 10/13/2020           After Visit Summary Signature Page    I have received my discharge instructions, and my questions have been answered. I have discussed any challenges I see with this plan with the nurse or doctor.    ..........................................................................................................................................  Patient/Patient Representative Signature      ..........................................................................................................................................  Patient Representative Print Name and Relationship to Patient    ..................................................               ................................................  Date                                   Time    ..........................................................................................................................................  Reviewed by Signature/Title    ...................................................              ..............................................  Date                                               Time          22EPIC Rev 08/18

## 2021-01-04 NOTE — TELEPHONE ENCOUNTER
Done    Thank you    Yoni Baxter M.D.      
losartan (COZAAR) 100 MG tablet 90 tablet 3 7/2/2019  No   Sig - Route: Take 1 tablet (100 mg) by mouth daily - Oral         Fax from pharmacy stating Losartan is on backorder.  Requesting alternative medication.    LOV 1-8-2020 Cory, 7-2-2019 [facility]    BP Readings from Last 6 Encounters:   01/08/20 136/84   08/15/19 136/80   07/02/19 132/84   03/07/19 138/86   08/13/18 136/88   04/17/18 130/84         Betty Cameron RT (R)    
97.4

## 2021-05-04 ENCOUNTER — TRANSFERRED RECORDS (OUTPATIENT)
Dept: HEALTH INFORMATION MANAGEMENT | Facility: CLINIC | Age: 66
End: 2021-05-04

## 2021-05-14 ENCOUNTER — TRANSFERRED RECORDS (OUTPATIENT)
Dept: HEALTH INFORMATION MANAGEMENT | Facility: CLINIC | Age: 66
End: 2021-05-14

## 2021-05-20 ENCOUNTER — OFFICE VISIT (OUTPATIENT)
Dept: FAMILY MEDICINE | Facility: CLINIC | Age: 66
End: 2021-05-20
Payer: COMMERCIAL

## 2021-05-20 VITALS
SYSTOLIC BLOOD PRESSURE: 138 MMHG | OXYGEN SATURATION: 98 % | HEART RATE: 71 BPM | HEIGHT: 56 IN | DIASTOLIC BLOOD PRESSURE: 88 MMHG | WEIGHT: 150 LBS | TEMPERATURE: 98 F | BODY MASS INDEX: 33.74 KG/M2

## 2021-05-20 DIAGNOSIS — I10 BENIGN ESSENTIAL HYPERTENSION: ICD-10-CM

## 2021-05-20 DIAGNOSIS — K21.9 GASTROESOPHAGEAL REFLUX DISEASE WITHOUT ESOPHAGITIS: ICD-10-CM

## 2021-05-20 DIAGNOSIS — E78.5 HYPERLIPIDEMIA LDL GOAL <130: ICD-10-CM

## 2021-05-20 DIAGNOSIS — E11.9 TYPE 2 DIABETES MELLITUS WITHOUT COMPLICATION, WITHOUT LONG-TERM CURRENT USE OF INSULIN (H): ICD-10-CM

## 2021-05-20 DIAGNOSIS — E55.9 VITAMIN D DEFICIENCY: ICD-10-CM

## 2021-05-20 DIAGNOSIS — Z00.00 ENCOUNTER FOR ANNUAL PHYSICAL EXAM: Primary | ICD-10-CM

## 2021-05-20 DIAGNOSIS — R06.09 DOE (DYSPNEA ON EXERTION): ICD-10-CM

## 2021-05-20 LAB
ALBUMIN SERPL-MCNC: 3.4 G/DL (ref 3.4–5)
ALP SERPL-CCNC: 76 U/L (ref 40–150)
ALT SERPL W P-5'-P-CCNC: 21 U/L (ref 0–50)
ANION GAP SERPL CALCULATED.3IONS-SCNC: 2 MMOL/L (ref 3–14)
AST SERPL W P-5'-P-CCNC: 10 U/L (ref 0–45)
BILIRUB SERPL-MCNC: 0.5 MG/DL (ref 0.2–1.3)
BUN SERPL-MCNC: 17 MG/DL (ref 7–30)
CALCIUM SERPL-MCNC: 9.3 MG/DL (ref 8.5–10.1)
CHLORIDE SERPL-SCNC: 106 MMOL/L (ref 94–109)
CHOLEST SERPL-MCNC: 237 MG/DL
CO2 SERPL-SCNC: 32 MMOL/L (ref 20–32)
CREAT SERPL-MCNC: 0.72 MG/DL (ref 0.52–1.04)
ERYTHROCYTE [DISTWIDTH] IN BLOOD BY AUTOMATED COUNT: 14.8 % (ref 10–15)
GFR SERPL CREATININE-BSD FRML MDRD: 89 ML/MIN/{1.73_M2}
GLUCOSE SERPL-MCNC: 96 MG/DL (ref 70–99)
HBA1C MFR BLD: 6.7 % (ref 0–5.6)
HCT VFR BLD AUTO: 38.9 % (ref 35–47)
HDLC SERPL-MCNC: 66 MG/DL
HGB BLD-MCNC: 12.4 G/DL (ref 11.7–15.7)
LDLC SERPL CALC-MCNC: 144 MG/DL
MCH RBC QN AUTO: 29 PG (ref 26.5–33)
MCHC RBC AUTO-ENTMCNC: 31.9 G/DL (ref 31.5–36.5)
MCV RBC AUTO: 91 FL (ref 78–100)
NONHDLC SERPL-MCNC: 171 MG/DL
PLATELET # BLD AUTO: 245 10E9/L (ref 150–450)
POTASSIUM SERPL-SCNC: 4.6 MMOL/L (ref 3.4–5.3)
PROT SERPL-MCNC: 7.6 G/DL (ref 6.8–8.8)
RBC # BLD AUTO: 4.28 10E12/L (ref 3.8–5.2)
SODIUM SERPL-SCNC: 140 MMOL/L (ref 133–144)
TRIGL SERPL-MCNC: 134 MG/DL
WBC # BLD AUTO: 10.2 10E9/L (ref 4–11)

## 2021-05-20 PROCEDURE — 80053 COMPREHEN METABOLIC PANEL: CPT | Performed by: INTERNAL MEDICINE

## 2021-05-20 PROCEDURE — 99207 PR FOOT EXAM NO CHARGE: CPT | Mod: 25 | Performed by: INTERNAL MEDICINE

## 2021-05-20 PROCEDURE — 80061 LIPID PANEL: CPT | Performed by: INTERNAL MEDICINE

## 2021-05-20 PROCEDURE — 99396 PREV VISIT EST AGE 40-64: CPT | Performed by: INTERNAL MEDICINE

## 2021-05-20 PROCEDURE — 36415 COLL VENOUS BLD VENIPUNCTURE: CPT | Performed by: INTERNAL MEDICINE

## 2021-05-20 PROCEDURE — 83036 HEMOGLOBIN GLYCOSYLATED A1C: CPT | Performed by: INTERNAL MEDICINE

## 2021-05-20 PROCEDURE — 85027 COMPLETE CBC AUTOMATED: CPT | Performed by: INTERNAL MEDICINE

## 2021-05-20 PROCEDURE — 82306 VITAMIN D 25 HYDROXY: CPT | Performed by: INTERNAL MEDICINE

## 2021-05-20 PROCEDURE — 99214 OFFICE O/P EST MOD 30 MIN: CPT | Mod: 25 | Performed by: INTERNAL MEDICINE

## 2021-05-20 RX ORDER — IRBESARTAN 300 MG/1
300 TABLET ORAL AT BEDTIME
Qty: 90 TABLET | Refills: 3 | Status: SHIPPED | OUTPATIENT
Start: 2021-05-20 | End: 2022-04-29

## 2021-05-20 RX ORDER — HYDROCHLOROTHIAZIDE 12.5 MG/1
12.5 TABLET ORAL DAILY
Qty: 90 TABLET | Refills: 3 | Status: SHIPPED | OUTPATIENT
Start: 2021-05-20 | End: 2022-04-29

## 2021-05-20 RX ORDER — SIMVASTATIN 40 MG
40 TABLET ORAL AT BEDTIME
Qty: 90 TABLET | Refills: 3 | Status: SHIPPED | OUTPATIENT
Start: 2021-05-20 | End: 2022-04-29

## 2021-05-20 ASSESSMENT — MIFFLIN-ST. JEOR: SCORE: 1093.4

## 2021-05-20 NOTE — LETTER
May 24, 2021      Teresa Herron  [address]   [place] [address]        Dear ,    We are writing to inform you of your test results.    It was nice to see you for your physical.  You should be able to view the labs.     For the most part they look very good including your complete blood count, blood salts, kidney tests, liver tests and proteins.  Your vitamin D level is also fine.     The cholesterol is significantly higher this year.  Please be sure to take the simvastatin for this.       The diabetes test or hemoglobin a1c is also higher at 6.7.  I am worried we may need to add medication for the diabetes soon.  The best way to avoid this is regular exercise and getting your weight down 10 pounds.     Please be sure to get the stress test and also let me know if the pepcid does not fix the gassiness.  In terms of your overall healthy please try to exercise more and get your weight down.  I would like you to come back and repeat some of the labs in 6 months.     If you have any questions please call me.     Resulted Orders   CBC with platelets   Result Value Ref Range    WBC 10.2 4.0 - 11.0 10e9/L    RBC Count 4.28 3.8 - 5.2 10e12/L    Hemoglobin 12.4 11.7 - 15.7 g/dL    Hematocrit 38.9 35.0 - 47.0 %    MCV 91 78 - 100 fl    MCH 29.0 26.5 - 33.0 pg    MCHC 31.9 31.5 - 36.5 g/dL    RDW 14.8 10.0 - 15.0 %    Platelet Count 245 150 - 450 10e9/L   Comprehensive metabolic panel   Result Value Ref Range    Sodium 140 133 - 144 mmol/L    Potassium 4.6 3.4 - 5.3 mmol/L    Chloride 106 94 - 109 mmol/L    Carbon Dioxide 32 20 - 32 mmol/L    Anion Gap 2 (L) 3 - 14 mmol/L    Glucose 96 70 - 99 mg/dL    Urea Nitrogen 17 7 - 30 mg/dL    Creatinine 0.72 0.52 - 1.04 mg/dL    GFR Estimate 89 >60 mL/min/[1.73_m2]      Comment:      Non  GFR Calc  Starting 12/18/2018, serum creatinine based estimated GFR (eGFR) will be   calculated using the Chronic Kidney Disease Epidemiology  Collaboration   (CKD-EPI) equation.      GFR Estimate If Black >90 >60 mL/min/[1.73_m2]      Comment:       GFR Calc  Starting 12/18/2018, serum creatinine based estimated GFR (eGFR) will be   calculated using the Chronic Kidney Disease Epidemiology Collaboration   (CKD-EPI) equation.      Calcium 9.3 8.5 - 10.1 mg/dL    Bilirubin Total 0.5 0.2 - 1.3 mg/dL    Albumin 3.4 3.4 - 5.0 g/dL    Protein Total 7.6 6.8 - 8.8 g/dL    Alkaline Phosphatase 76 40 - 150 U/L    ALT 21 0 - 50 U/L    AST 10 0 - 45 U/L   Lipid panel reflex to direct LDL Non-fasting   Result Value Ref Range    Cholesterol 237 (H) <200 mg/dL      Comment:      Desirable:       <200 mg/dl    Triglycerides 134 <150 mg/dL    HDL Cholesterol 66 >49 mg/dL    LDL Cholesterol Calculated 144 (H) <100 mg/dL      Comment:      Above desirable:  100-129 mg/dl  Borderline High:  130-159 mg/dL  High:             160-189 mg/dL  Very high:       >189 mg/dl      Non HDL Cholesterol 171 (H) <130 mg/dL      Comment:      Above Desirable:  130-159 mg/dl  Borderline high:  160-189 mg/dl  High:             190-219 mg/dl  Very high:       >219 mg/dl     Hemoglobin A1c   Result Value Ref Range    Hemoglobin A1C 6.7 (H) 0 - 5.6 %      Comment:      Normal <5.7% Prediabetes 5.7-6.4%  Diabetes 6.5% or higher - adopted from ADA   consensus guidelines.     Vitamin D Deficiency   Result Value Ref Range    Vitamin D Deficiency screening 26 20 - 75 ug/L      Comment:      Season, race, dietary intake, and treatment affect the concentration of   25-hydroxy-Vitamin D. Values may decrease during winter months and increase   during summer months. Values 20-29 ug/L may indicate Vitamin D insufficiency   and values <20 ug/L may indicate Vitamin D deficiency.  Vitamin D determination is routinely performed by an immunoassay specific for   25 hydroxyvitamin D3.  If an individual is on vitamin D2 (ergocalciferol)   supplementation, please specify 25 OH vitamin D2 and D3 level  determination by   LCMSMS test [id number].         If you have any questions or concerns, please call the clinic at the number listed above.       Sincerely,      Yoni Baxter MD

## 2021-05-20 NOTE — PATIENT INSTRUCTIONS
1. I will have the heart clinic call you to do the stress test.    2. Try pepcid ac for the gassiness and let me know if this does not help    Yoni Baxter M.D.

## 2021-05-20 NOTE — PROGRESS NOTES
SUBJECTIVE:   CC: Teresa Herron is an 65 year old woman who presents for preventive health visit.     She does have a couple of issues.    She does get winded with exertion but it has been this way for quite some time.  She does not have chest pain or shortness of breath at rest.    She notes gassiness for quite some time with the need to belch.  No dysphagia.  No unexplained weight changes.    She otherwise  is doing well, not working after fall and injury at work.   had cabg recently.  She has diabetes, not on meds, does not check sugar, to get eye exam soon, feet fine.  No c/o.  Does not see gyn.  Has djd and reg ortho follow up.      Patient has been advised of split billing requirements and indicates understanding: Yes  Healthy Habits:     Getting at least 3 servings of Calcium per day:  Yes    Bi-annual eye exam:  Yes    Dental care twice a year:  Yes    Sleep apnea or symptoms of sleep apnea:  None    Diet:  Regular (no restrictions)    Frequency of exercise:  None    Taking medications regularly:  Yes    Barriers to taking medications:  None    Medication side effects:  None    PHQ-2 Total Score: 0    Additional concerns today:  No              Today's PHQ-2 Score:   PHQ-2 ( 1999 Pfizer) 9/4/2020   Q1: Little interest or pleasure in doing things 0   Q2: Feeling down, depressed or hopeless 0   PHQ-2 Score 0       Abuse: Current or Past (Physical, Sexual or Emotional) - No  Do you feel safe in your environment? Yes        Social History     Tobacco Use     Smoking status: Never Smoker     Smokeless tobacco: Never Used   Substance Use Topics     Alcohol use: No     Alcohol/week: 0.0 standard drinks     If you drink alcohol do you typically have >3 drinks per day or >7 drinks per week? No               Past Medical History:      Past Medical History:   Diagnosis Date     Arthritis of knee, degenerative     seen by Dr. Jones 2017 and cortisone did not help     Chest pain 04/2015    nl est echo      Dizziness years     GERD (gastroesophageal reflux disease)      H/O colonoscopy 2014    tics, tortuous, fu 5 years     HTN (hypertension) 1988     Hypercholesteraemia 2010     Screening for osteoporosis 10/2020    nl dexa     Type II or unspecified type diabetes mellitus without mention of complication, not stated as uncontrolled     not on meds as of 2018     Vitamin D deficiency              Past Surgical History:      Past Surgical History:   Procedure Laterality Date     COLONOSCOPY N/A 11/18/2014    Procedure: COLONOSCOPY;  Surgeon: Yoni Carvajal MD;  Location:  GI     fibroid surgery  2000     HYSTERECTOMY, PAP NO LONGER INDICATED  2006    estrada/bso due to pain and bleeding     LAPAROSCOPIC APPENDECTOMY  5/19/2014    Procedure: LAPAROSCOPIC APPENDECTOMY;  Surgeon: Will Barth MD;  Location: [facility]             Social History:     Social History     Socioeconomic History     Marital status:      Spouse name: Not on file     Number of children: 1     Years of education: Not on file     Highest education level: Not on file   Occupational History     Occupation: work dietary, in kitchen     Employer: [employer]   Social Needs     Financial resource strain: Not on file     Food insecurity     Worry: Not on file     Inability: Not on file     Transportation needs     Medical: Not on file     Non-medical: Not on file   Tobacco Use     Smoking status: Never Smoker     Smokeless tobacco: Never Used   Substance and Sexual Activity     Alcohol use: No     Alcohol/week: 0.0 standard drinks     Drug use: No     Sexual activity: Not Currently   Lifestyle     Physical activity     Days per week: Not on file     Minutes per session: Not on file     Stress: Not on file   Relationships     Social connections     Talks on phone: Not on file     Gets together: Not on file     Attends Yarsani service: Not on file     Active member of club or organization: Not on file     Attends meetings of  "clubs or organizations: Not on file     Relationship status: Not on file     Intimate partner violence     Fear of current or ex partner: Not on file     Emotionally abused: Not on file     Physically abused: Not on file     Forced sexual activity: Not on file   Other Topics Concern     Not on file   Social History Narrative     Not on file             Family History:   reviewed         Allergies:   No Known Allergies          Medications:     Current Outpatient Medications   Medication Sig Dispense Refill     aspirin 81 MG EC tablet Take 1 tablet (81 mg) by mouth daily 90 tablet 3     hydrochlorothiazide (HYDRODIURIL) 12.5 MG tablet Take 1 tablet (12.5 mg) by mouth daily 90 tablet 3     irbesartan (AVAPRO) 300 MG tablet Take 1 tablet (300 mg) by mouth At Bedtime 90 tablet 3     omega 3 1000 MG CAPS Take 1 g by mouth daily 90 capsule      simvastatin (ZOCOR) 40 MG tablet Take 1 tablet (40 mg) by mouth At Bedtime 90 tablet 3               Review of Systems:   The 10 point Review of Systems is negative other than noted in the HPI           Physical Exam:   Blood pressure 138/88, pulse 71, temperature 98  F (36.7  C), temperature source Oral, height 1.422 m (4' 8\"), weight 68 kg (150 lb), SpO2 98 %, not currently breastfeeding.    Exam:  Constitutional: healthy appearing, alert and in no distress  Heent: Normocephalic. Head without obvious masses or lesions. PERRLDC, EOMI. Mouth exam within normal limits: tongue, mucous membranes, posterior pharynx all normal, no lesions or abnormalities seen.  Tm's and canals within normal limits bilaterally. Neck supple, no nuchal rigidity or masses. No supraclavicular, or cervical adenopathy. Thyroid symmetric, no masses.  Cardiovascular: Regular rate and rhythm, no murmer, rub or gallops.  JVP not elevated, no edema.  Carotids within normal limits bilaterally, no bruits.  Respiratory: Normal respiratory effort.  Lungs clear, normal flow, no wheezing or crackles.  Breasts: Normal " bilaterally.  No masses or lesions.  Nipples within normal limits.  No axillary lesions or nodes.  My M.A. Was present during this part of the examination.  Gastrointestinal: Normal active bowel sounds.   Soft, not tender, no masses, guarding or rebound.  No hepatosplenomegaly.   Musculoskeletal: extremities normal, no gross deformities noted.  Skin: no suspicious lesions or rashes   Neurologic: Mental status within normal limits.  Speech fluent.  No gross motor abnormalities and gait intact.  Psychiatric: mentation appears normal and affect normal.  Feet within normal limits bilat         Data:   Labs sent        Assessment:   1. Normal complete physical exam  2. Diabetes, not on meds, needs weight loss, to get eye exam  3. Dyspnea on exertion, suspect weight, decond, but to be safe will check est  4. Gassiness, suspect gerd, doubt malig cause  5. Hypertension, control fair  6. Elevated cholesterol, on statin  7. Vit d defic, on it  8. hcm         Plan:   Routine colon exam as last not great prep  Est echo  Try pepcid, call if not effective  Exercise, diet and weight loss  Up to date mammogram  Letter with labs  Discussed with opt need for covid shot      Yoni Baxter M.D.

## 2021-05-21 ENCOUNTER — APPOINTMENT (OUTPATIENT)
Dept: LAB | Facility: CLINIC | Age: 66
End: 2021-05-21
Payer: COMMERCIAL

## 2021-05-21 LAB — DEPRECATED CALCIDIOL+CALCIFEROL SERPL-MC: 26 UG/L (ref 20–75)

## 2021-05-24 NOTE — RESULT ENCOUNTER NOTE
It was nice to see you for your physical.  You should be able to view the labs.    For the most part they look very good including your complete blood count, blood salts, kidney tests, liver tests and proteins.  Your vitamin D level is also fine.    The cholesterol is significantly higher this year.  Please be sure to take the simvastatin for this.      The diabetes test or hemoglobin a1c is also higher at 6.7.  I am worried we may need to add medication for the diabetes soon.  The best way to avoid this is regular exercise and getting your weight down 10 pounds.    Please be sure to get the stress test and also let me know if the pepcid does not fix the gassiness.  In terms of your overall healthy please try to exercise more and get your weight down.  I would like you to come back and repeat some of the labs in 6 months.    If you have any questions please call me.    Yoni Baxter M.D.

## 2021-06-04 ENCOUNTER — HOSPITAL ENCOUNTER (OUTPATIENT)
Dept: CARDIOLOGY | Facility: CLINIC | Age: 66
Discharge: HOME | End: 2021-06-04
Attending: INTERNAL MEDICINE | Admitting: INTERNAL MEDICINE
Payer: COMMERCIAL

## 2021-06-04 ENCOUNTER — TELEPHONE (OUTPATIENT)
Dept: FAMILY MEDICINE | Facility: CLINIC | Age: 66
End: 2021-06-04

## 2021-06-04 DIAGNOSIS — R06.09 DOE (DYSPNEA ON EXERTION): ICD-10-CM

## 2021-06-04 DIAGNOSIS — R94.39 ABNORMAL CARDIOVASCULAR STRESS TEST: Primary | ICD-10-CM

## 2021-06-04 PROCEDURE — 93325 DOPPLER ECHO COLOR FLOW MAPG: CPT | Mod: 26 | Performed by: INTERNAL MEDICINE

## 2021-06-04 PROCEDURE — 999N000208 ECHO STRESS ECHOCARDIOGRAM

## 2021-06-04 PROCEDURE — 93018 CV STRESS TEST I&R ONLY: CPT | Performed by: INTERNAL MEDICINE

## 2021-06-04 PROCEDURE — 93016 CV STRESS TEST SUPVJ ONLY: CPT | Performed by: INTERNAL MEDICINE

## 2021-06-04 PROCEDURE — 255N000002 HC RX 255 OP 636: Performed by: INTERNAL MEDICINE

## 2021-06-04 PROCEDURE — 93350 STRESS TTE ONLY: CPT | Mod: 26 | Performed by: INTERNAL MEDICINE

## 2021-06-04 PROCEDURE — 93321 DOPPLER ECHO F-UP/LMTD STD: CPT | Mod: 26 | Performed by: INTERNAL MEDICINE

## 2021-06-04 RX ADMIN — HUMAN ALBUMIN MICROSPHERES AND PERFLUTREN 6 ML: 10; .22 INJECTION, SOLUTION INTRAVENOUS at 14:16

## 2021-06-04 NOTE — TELEPHONE ENCOUNTER
Doctor Dorado from [facility]  called to make PCP aware of echocardiogram results today. Please see notes below. Provider recommends cardiology referral and coronary angiogram follow up test.    Normal rest wall motion with stress-induced wall motion abnormalities  consistent with ischemia in the apical and inferior wall(s).

## 2021-06-06 NOTE — TELEPHONE ENCOUNTER
I discussed with patient the results.  To see cards.  Call if chest pain or worsening shortness of breath.    Yoni Baxter M.D.

## 2021-06-08 ENCOUNTER — OFFICE VISIT (OUTPATIENT)
Dept: CARDIOLOGY | Facility: CLINIC | Age: 66
End: 2021-06-08
Attending: INTERNAL MEDICINE
Payer: COMMERCIAL

## 2021-06-08 VITALS
BODY MASS INDEX: 34.04 KG/M2 | HEIGHT: 56 IN | WEIGHT: 151.3 LBS | DIASTOLIC BLOOD PRESSURE: 78 MMHG | HEART RATE: 80 BPM | SYSTOLIC BLOOD PRESSURE: 127 MMHG

## 2021-06-08 DIAGNOSIS — R94.39 ABNORMAL CARDIOVASCULAR STRESS TEST: ICD-10-CM

## 2021-06-08 DIAGNOSIS — E11.9 TYPE 2 DIABETES MELLITUS WITHOUT COMPLICATION, WITHOUT LONG-TERM CURRENT USE OF INSULIN (H): ICD-10-CM

## 2021-06-08 DIAGNOSIS — E78.5 HYPERLIPIDEMIA LDL GOAL <70: ICD-10-CM

## 2021-06-08 DIAGNOSIS — E78.5 HYPERLIPIDEMIA LDL GOAL <130: Primary | ICD-10-CM

## 2021-06-08 DIAGNOSIS — Z11.59 ENCOUNTER FOR SCREENING FOR OTHER VIRAL DISEASES: ICD-10-CM

## 2021-06-08 DIAGNOSIS — I10 BENIGN ESSENTIAL HYPERTENSION: ICD-10-CM

## 2021-06-08 PROCEDURE — 99204 OFFICE O/P NEW MOD 45 MIN: CPT | Performed by: INTERNAL MEDICINE

## 2021-06-08 PROCEDURE — 93000 ELECTROCARDIOGRAM COMPLETE: CPT | Performed by: INTERNAL MEDICINE

## 2021-06-08 RX ORDER — FAMOTIDINE 10 MG
10 TABLET ORAL PRN
COMMUNITY
End: 2023-03-03

## 2021-06-08 RX ORDER — NITROGLYCERIN 0.4 MG/1
TABLET SUBLINGUAL
Qty: 30 TABLET | Refills: 11 | Status: SHIPPED | OUTPATIENT
Start: 2021-06-08

## 2021-06-08 RX ORDER — ASPIRIN 81 MG/1
81 TABLET ORAL PRN
COMMUNITY

## 2021-06-08 RX ORDER — METOPROLOL SUCCINATE 25 MG/1
25 TABLET, EXTENDED RELEASE ORAL DAILY
Qty: 90 TABLET | Refills: 3 | Status: ON HOLD | OUTPATIENT
Start: 2021-06-08 | End: 2021-06-17

## 2021-06-08 ASSESSMENT — MIFFLIN-ST. JEOR: SCORE: 1099.29

## 2021-06-08 NOTE — PROGRESS NOTES
CARDIOLOGY CLINIC CONSULT    REASON FOR CONSULT: Abnormal stress test and chest pain    PRIMARY CARE PHYSICIAN:  Yoni Baxter    HISTORY OF PRESENT ILLNESS:  Teresa Herron is a very nice 64-year-old woman with hypertension, dyslipidemia and type 2 diabetes here to follow-up a recent abnormal cardiovascular stress test. She initially presented to her PCP for shortness of breath and chest discomfort with exertion.  She states she has had the symptoms for about 1 year. The chest pain does radiate to her back in the mid-scapular region. She has no prior personal h/o MI or CVA.  She also occasionally notes a single palpitation and mild dizziness with position changed but has not had prolonged palpitations or fallen or had syncope.  She has no lower extremity edema. After the stress test she has had some soreness in her chest which she thinks is from pushing from the echo probe.     Her exercise stress echo done on June 4, 2021 showed stress induced hypokinesis of the apical and inferior walls and the ejection fraction did not augment with stress.  She exercised for 4 minutes and 10 seconds and stopped due to fatigue and had a borderline hypertensive response to exercise.  She does note some residual soreness in her ribs from the echo probe.    No history of adverse reaction to anesthesia or sedation  No allergies to medications or contrast dye      PAST MEDICAL HISTORY:  Past Medical History:   Diagnosis Date     Arthritis of knee, degenerative     seen by Dr. Jones 2017 and cortisone did not help     Chest pain 04/2015    nl est echo     Dizziness years     GERD (gastroesophageal reflux disease)      H/O colonoscopy 2014    tics, tortuous, fu 5 years     HTN (hypertension) 1988     Hypercholesteraemia 2010     Screening for osteoporosis 10/2020    nl dexa     Type II or unspecified type diabetes mellitus without mention of complication, not stated as uncontrolled     not on meds as of 2018     Vitamin D deficiency         MEDICATIONS:  Current Outpatient Medications   Medication     aspirin 81 MG EC tablet     cholecalciferol (VITAMIN D3) 25 mcg (1000 units) capsule     famotidine (PEPCID) 10 MG tablet     hydrochlorothiazide (HYDRODIURIL) 12.5 MG tablet     irbesartan (AVAPRO) 300 MG tablet     omega 3 1000 MG CAPS     simvastatin (ZOCOR) 40 MG tablet     No current facility-administered medications for this visit.    she was not taking her aspirin or simvastatin on a regular daily basis as prescribed. She was instructed by her PCP to take them daily and has started taking them daily.     ALLERGIES:  No Known Allergies    SOCIAL HISTORY:  Non-smoker  Here today with her     FAMILY HISTORY:  No known family history of CAD.     REVIEW OF SYSTEMS:  Skin:  Positive for rash   Eyes:  Positive for glasses  ENT:  Negative    Respiratory:  Positive for dyspnea on exertion  Cardiovascular:    Positive for;chest pain;palpitations;dizziness;lightheadedness;lower extremity symptoms  Gastroenterology: Positive for heartburn;reflux  Genitourinary:  Negative    Musculoskeletal:  Positive for arthritis  Neurologic:  Negative    Psychiatric:  Negative    Heme/Lymph/Imm:  Negative    Endocrine:  Negative      PHYSICAL EXAM:      BP: 127/78 Pulse: 80            Vital Signs with Ranges  Pulse:  [80] 80  BP: (127)/(78) 127/78  151 lbs 4.8 oz    Constitutional: awake, alert, no distress  Eyes: sclera nonicteric  ENT: trachea midline  Respiratory: Clear bilaterally  Cardiovascular: Regular rate and rhythm no murmur  GI: nondistended, nontender, bowel sounds present  Skin: dry, no rash no edema  Musculoskeletal: grossly normal muscle bulk and tone  Neuropsychiatric: appropriate affect      DATA:   LAST CHOLESTEROL:  Lab Results   Component Value Date    CHOL 237 05/20/2021     Lab Results   Component Value Date    HDL 66 05/20/2021     Lab Results   Component Value Date     05/20/2021     Lab Results   Component Value Date    TRIG 134  05/20/2021     Lab Results   Component Value Date    CHOLHDLRATIO 5.0 10/19/2015       LAST BMP:  Lab Results   Component Value Date     05/20/2021      Lab Results   Component Value Date    POTASSIUM 4.6 05/20/2021     Lab Results   Component Value Date    CHLORIDE 106 05/20/2021     Lab Results   Component Value Date    AMISH 9.3 05/20/2021     Lab Results   Component Value Date    CO2 32 05/20/2021     Lab Results   Component Value Date    BUN 17 05/20/2021     Lab Results   Component Value Date    CR 0.72 05/20/2021     Lab Results   Component Value Date    GLC 96 05/20/2021       LAST CBC:  Lab Results   Component Value Date    WBC 10.2 05/20/2021     Lab Results   Component Value Date    RBC 4.28 05/20/2021     Lab Results   Component Value Date    HGB 12.4 05/20/2021     Lab Results   Component Value Date    HCT 38.9 05/20/2021     Lab Results   Component Value Date    MCV 91 05/20/2021     Lab Results   Component Value Date    MCH 29.0 05/20/2021     Lab Results   Component Value Date    MCHC 31.9 05/20/2021     Lab Results   Component Value Date    RDW 14.8 05/20/2021     Lab Results   Component Value Date     05/20/2021         EKG 6//21 normal sinus rhythm, LAE    Echo  Stress echo 5/20/21  Normal rest wall motion with stress-induced wall motion abnormalities  consistent with ischemia in the apical and inferior wall(s).  Consider coronary angiogram for further assessment.  Findings discussed with the nurse of Dr. Baxter at 4.30 pm.    Stress  The patient exercised 4:10.  RPP 28,728.  Exercise was stopped due to fatigue.  There was a borderline hypertensive BP response to exercise.  The patient exhibited no chest pain during exercise.  A low workload was achieved.  A treadmill exercise test according to the [facility] protocol was performed.  Target Heart Rate was achieved.  There were no ST segment changes observed with stress.  Left ventricular cavity size decreases with exercise.  Global LV  systolic function does not change with exercise.  The visual ejection fraction is estimated at 55-60%.  Normal rest wall motion with stress-induced wall motion abnormalities  consistent with ischemia in the apical wall(s).        ASSESSMENT:  1. Abnormal stress test, apical and inferior wall  2. Exertional angina and dyspnea for several months.   3. HTN, controlled  4. Dyslipidemia,  on simvastatin. LDL not adequately controlled with simvastatin, but she was not consistently taking it.  She has previously also taken rosuvastatin but it was too expensive. She has started taking the simvastatin regularly. She would prefer to continue simvastatin for now.  5. DM2      RECOMMENDATIONS:  1. Schedule coronary angiogram with possible PCI.  2. Rx for sublingual nitroglycerin given with instructions for use.  3. Take aspirin 81mg PO daily (she was not consistently.   4. Pre-procedure CXR and labs.  5. Recheck lipids in 2-3 months.     Risks and benefits of the procedure were discussed with the patient in detail that includes but not limited to the risk of stroke, heart attack, death, cardiac or vascular perforation, emergent stenting or bypass, contrast induced allergic reaction, renal dysfunction (including risks of temporary or permanent dialysis), risk of respiratory depression with sedation, and vascular complications (including bleeding and transfusion). Patient denies any major active bleeding issues and is willing to take and comply with dual antiplatelet therapy and understands associated bleeding risks. Patient understands the overall risks of the procedure and wishes to proceed.      Renata eHrring MD [facility] Heart  Text Page

## 2021-06-08 NOTE — LETTER
6/8/2021    Yoni Baxter MD  [address]  [facility] [address]    RE: Teresa Herron       Dear Colleague,    I had the pleasure of seeing Teresa Herron in the [facility].    CARDIOLOGY CLINIC CONSULT    REASON FOR CONSULT: Abnormal stress test and chest pain    PRIMARY CARE PHYSICIAN:  Yoni Baxter    HISTORY OF PRESENT ILLNESS:  Teresa Herron is a very nice 64-year-old woman with hypertension, dyslipidemia and type 2 diabetes here to follow-up a recent abnormal cardiovascular stress test. She initially presented to her PCP for shortness of breath and chest discomfort with exertion.  She states she has had the symptoms for about 1 year. The chest pain does radiate to her back in the mid-scapular region. She has no prior personal h/o MI or CVA.  She also occasionally notes a single palpitation and mild dizziness with position changed but has not had prolonged palpitations or fallen or had syncope.  She has no lower extremity edema. After the stress test she has had some soreness in her chest which she thinks is from pushing from the echo probe.     Her exercise stress echo done on June 4, 2021 showed stress induced hypokinesis of the apical and inferior walls and the ejection fraction did not augment with stress.  She exercised for 4 minutes and 10 seconds and stopped due to fatigue and had a borderline hypertensive response to exercise.  She does note some residual soreness in her ribs from the echo probe.    No history of adverse reaction to anesthesia or sedation  No allergies to medications or contrast dye      PAST MEDICAL HISTORY:  Past Medical History:   Diagnosis Date     Arthritis of knee, degenerative     seen by Dr. Jones 2017 and cortisone did not help     Chest pain 04/2015    nl est echo     Dizziness years     GERD (gastroesophageal reflux disease)      H/O colonoscopy 2014    tics, tortuous, fu 5 years     HTN (hypertension)  1988     Hypercholesteraemia 2010     Screening for osteoporosis 10/2020    nl dexa     Type II or unspecified type diabetes mellitus without mention of complication, not stated as uncontrolled     not on meds as of 2018     Vitamin D deficiency        MEDICATIONS:  Current Outpatient Medications   Medication     aspirin 81 MG EC tablet     cholecalciferol (VITAMIN D3) 25 mcg (1000 units) capsule     famotidine (PEPCID) 10 MG tablet     hydrochlorothiazide (HYDRODIURIL) 12.5 MG tablet     irbesartan (AVAPRO) 300 MG tablet     omega 3 1000 MG CAPS     simvastatin (ZOCOR) 40 MG tablet     No current facility-administered medications for this visit.    she was not taking her aspirin or simvastatin on a regular daily basis as prescribed. She was instructed by her PCP to take them daily and has started taking them daily.     ALLERGIES:  No Known Allergies    SOCIAL HISTORY:  Non-smoker  Here today with her     FAMILY HISTORY:  No known family history of CAD.     REVIEW OF SYSTEMS:  Skin:  Positive for rash   Eyes:  Positive for glasses  ENT:  Negative    Respiratory:  Positive for dyspnea on exertion  Cardiovascular:    Positive for;chest pain;palpitations;dizziness;lightheadedness;lower extremity symptoms  Gastroenterology: Positive for heartburn;reflux  Genitourinary:  Negative    Musculoskeletal:  Positive for arthritis  Neurologic:  Negative    Psychiatric:  Negative    Heme/Lymph/Imm:  Negative    Endocrine:  Negative      PHYSICAL EXAM:      BP: 127/78 Pulse: 80            Vital Signs with Ranges  Pulse:  [80] 80  BP: (127)/(78) 127/78  151 lbs 4.8 oz    Constitutional: awake, alert, no distress  Eyes: sclera nonicteric  ENT: trachea midline  Respiratory: Clear bilaterally  Cardiovascular: Regular rate and rhythm no murmur  GI: nondistended, nontender, bowel sounds present  Skin: dry, no rash no edema  Musculoskeletal: grossly normal muscle bulk and tone  Neuropsychiatric: appropriate affect      DATA:    LAST CHOLESTEROL:  Lab Results   Component Value Date    CHOL 237 05/20/2021     Lab Results   Component Value Date    HDL 66 05/20/2021     Lab Results   Component Value Date     05/20/2021     Lab Results   Component Value Date    TRIG 134 05/20/2021     Lab Results   Component Value Date    CHOLHDLRATIO 5.0 10/19/2015       LAST BMP:  Lab Results   Component Value Date     05/20/2021      Lab Results   Component Value Date    POTASSIUM 4.6 05/20/2021     Lab Results   Component Value Date    CHLORIDE 106 05/20/2021     Lab Results   Component Value Date    AMISH 9.3 05/20/2021     Lab Results   Component Value Date    CO2 32 05/20/2021     Lab Results   Component Value Date    BUN 17 05/20/2021     Lab Results   Component Value Date    CR 0.72 05/20/2021     Lab Results   Component Value Date    GLC 96 05/20/2021       LAST CBC:  Lab Results   Component Value Date    WBC 10.2 05/20/2021     Lab Results   Component Value Date    RBC 4.28 05/20/2021     Lab Results   Component Value Date    HGB 12.4 05/20/2021     Lab Results   Component Value Date    HCT 38.9 05/20/2021     Lab Results   Component Value Date    MCV 91 05/20/2021     Lab Results   Component Value Date    MCH 29.0 05/20/2021     Lab Results   Component Value Date    MCHC 31.9 05/20/2021     Lab Results   Component Value Date    RDW 14.8 05/20/2021     Lab Results   Component Value Date     05/20/2021         EKG 6//21 normal sinus rhythm, LAE    Echo  Stress echo 5/20/21  Normal rest wall motion with stress-induced wall motion abnormalities  consistent with ischemia in the apical and inferior wall(s).  Consider coronary angiogram for further assessment.  Findings discussed with the nurse of Dr. Baxter at 4.30 pm.    Stress  The patient exercised 4:10.  RPP 28,728.  Exercise was stopped due to fatigue.  There was a borderline hypertensive BP response to exercise.  The patient exhibited no chest pain during exercise.  A low  workload was achieved.  A treadmill exercise test according to the [facility] protocol was performed.  Target Heart Rate was achieved.  There were no ST segment changes observed with stress.  Left ventricular cavity size decreases with exercise.  Global LV systolic function does not change with exercise.  The visual ejection fraction is estimated at 55-60%.  Normal rest wall motion with stress-induced wall motion abnormalities  consistent with ischemia in the apical wall(s).        ASSESSMENT:  1. Abnormal stress test, apical and inferior wall  2. Exertional angina and dyspnea for several months.   3. HTN, controlled  4. Dyslipidemia,  on simvastatin. LDL not adequately controlled with simvastatin, but she was not consistently taking it.  She has previously also taken rosuvastatin but it was too expensive. She has started taking the simvastatin regularly. She would prefer to continue simvastatin for now.  5. DM2      RECOMMENDATIONS:  1. Schedule coronary angiogram with possible PCI.  2. Rx for sublingual nitroglycerin given with instructions for use.  3. Take aspirin 81mg PO daily (she was not consistently.   4. Pre-procedure CXR and labs.  5. Recheck lipids in 2-3 months.     Risks and benefits of the procedure were discussed with the patient in detail that includes but not limited to the risk of stroke, heart attack, death, cardiac or vascular perforation, emergent stenting or bypass, contrast induced allergic reaction, renal dysfunction (including risks of temporary or permanent dialysis), risk of respiratory depression with sedation, and vascular complications (including bleeding and transfusion). Patient denies any major active bleeding issues and is willing to take and comply with dual antiplatelet therapy and understands associated bleeding risks. Patient understands the overall risks of the procedure and wishes to proceed.      Renata Herring MD [facility] Heart  Text Page     cc:   Yoni Baxter,  MD  [address]  [place] [address]

## 2021-06-08 NOTE — PATIENT INSTRUCTIONS
1.  Schedule angiogram.  2.  Start metoprolol succinate 25mg daily  3.   Prescription for nitroglycerin.  4.  Schedule follow up after angiogram.

## 2021-06-09 ENCOUNTER — HOSPITAL ENCOUNTER (OUTPATIENT)
Dept: GENERAL RADIOLOGY | Facility: CLINIC | Age: 66
Discharge: HOME | End: 2021-06-09
Attending: INTERNAL MEDICINE | Admitting: INTERNAL MEDICINE
Payer: COMMERCIAL

## 2021-06-09 DIAGNOSIS — R94.39 ABNORMAL CARDIOVASCULAR STRESS TEST: ICD-10-CM

## 2021-06-09 PROCEDURE — 71046 X-RAY EXAM CHEST 2 VIEWS: CPT

## 2021-06-14 DIAGNOSIS — Z11.59 ENCOUNTER FOR SCREENING FOR OTHER VIRAL DISEASES: ICD-10-CM

## 2021-06-14 LAB
LABORATORY COMMENT REPORT: NORMAL
SARS-COV-2 RNA RESP QL NAA+PROBE: NEGATIVE
SARS-COV-2 RNA RESP QL NAA+PROBE: NORMAL
SPECIMEN SOURCE: NORMAL
SPECIMEN SOURCE: NORMAL

## 2021-06-14 PROCEDURE — U0005 INFEC AGEN DETEC AMPLI PROBE: HCPCS | Performed by: INTERNAL MEDICINE

## 2021-06-14 PROCEDURE — U0003 INFECTIOUS AGENT DETECTION BY NUCLEIC ACID (DNA OR RNA); SEVERE ACUTE RESPIRATORY SYNDROME CORONAVIRUS 2 (SARS-COV-2) (CORONAVIRUS DISEASE [COVID-19]), AMPLIFIED PROBE TECHNIQUE, MAKING USE OF HIGH THROUGHPUT TECHNOLOGIES AS DESCRIBED BY CMS-2020-01-R: HCPCS | Performed by: INTERNAL MEDICINE

## 2021-06-16 DIAGNOSIS — R94.39 ABNORMAL CARDIOVASCULAR STRESS TEST: Primary | ICD-10-CM

## 2021-06-16 RX ORDER — ASPIRIN 81 MG/1
81 TABLET ORAL DAILY
Status: CANCELLED | OUTPATIENT
Start: 2021-06-16 | End: 2021-06-18

## 2021-06-16 RX ORDER — POTASSIUM CHLORIDE 1500 MG/1
20 TABLET, EXTENDED RELEASE ORAL
Status: CANCELLED | OUTPATIENT
Start: 2021-06-16

## 2021-06-16 RX ORDER — LIDOCAINE 40 MG/G
CREAM TOPICAL
Status: CANCELLED | OUTPATIENT
Start: 2021-06-16

## 2021-06-16 RX ORDER — SODIUM CHLORIDE 9 MG/ML
INJECTION, SOLUTION INTRAVENOUS CONTINUOUS
Status: CANCELLED | OUTPATIENT
Start: 2021-06-16

## 2021-06-16 NOTE — PROGRESS NOTES
Contacted patient to review pre-procedure instructions: patient is scheduled for coronary angiogram on 6/17/21. Patient's arrival time is 9:00 am and procedure time is 11:00 am. Patient had a COVID test on 6/14/21 that was negative. Patient is aware to be NPO except for medications after midnight. Patient will hold the medication hydrochlorothiazide. Patient has arranged for transportation and someone to stay with her for 24 hours after the procedure. Patient has no known contrast dye allergy. Patient is not on any diabetic medications. Patient is not taking anti-coagulation medication. Patient's renal function is WNL for procedure. Patient will continue daily aspirin 81 mg. Orders for procedure entered. Reviewed that patient has post-angiogram follow up scheduled on 6/24/21 at 4:00 pm with CATALINO Reyes. Patient verbalized understanding of all instructions.   .   Wellness Screening Tool    Symptom Screening:    Do you have one of the following NEW symptoms:      Fever (subjective or >100.0)? No    New cough? No    Shortness of breath? No    Chills? No     New loss of taste or smell? No     Generalized body aches? No    New persistent headache? No     New sore throat? No   Nausea, vomiting or diarrhea? No     Within the past 2 weeks, have you been exposed to someone with a known positive illness below?      COVID - 19 (known or suspected: No    Chicken pox?  No    Measles? No    Pertussis? No     Have you had a positive COVID-19 diagnostic test (nasal swab test) in the last 14 days or are you currently on self-quarantine restrictions (i.e.travel restriction, exposure, etc?) No    Patient notified of visitor restriction: Yes  Patient informed to wear a mask: Yes    Patient's appointment status: Patient will keep procedure as scheduled on 6/17/21.

## 2021-06-17 ENCOUNTER — HOSPITAL ENCOUNTER (OUTPATIENT)
Facility: CLINIC | Age: 66
Discharge: HOME | End: 2021-06-17
Admitting: INTERNAL MEDICINE
Payer: COMMERCIAL

## 2021-06-17 VITALS
HEART RATE: 60 BPM | SYSTOLIC BLOOD PRESSURE: 129 MMHG | HEIGHT: 56 IN | WEIGHT: 151.9 LBS | OXYGEN SATURATION: 96 % | BODY MASS INDEX: 34.17 KG/M2 | DIASTOLIC BLOOD PRESSURE: 66 MMHG | RESPIRATION RATE: 16 BRPM | TEMPERATURE: 98 F

## 2021-06-17 DIAGNOSIS — R94.39 ABNORMAL CARDIOVASCULAR STRESS TEST: ICD-10-CM

## 2021-06-17 PROBLEM — Z98.890 STATUS POST CORONARY ANGIOGRAM: Status: ACTIVE | Noted: 2021-06-17

## 2021-06-17 LAB
ANION GAP SERPL CALCULATED.3IONS-SCNC: 2 MMOL/L (ref 3–14)
APTT PPP: 31 SEC (ref 22–37)
BUN SERPL-MCNC: 12 MG/DL (ref 7–30)
CALCIUM SERPL-MCNC: 9.1 MG/DL (ref 8.5–10.1)
CHLORIDE SERPL-SCNC: 106 MMOL/L (ref 94–109)
CO2 SERPL-SCNC: 32 MMOL/L (ref 20–32)
CREAT SERPL-MCNC: 0.7 MG/DL (ref 0.52–1.04)
ERYTHROCYTE [DISTWIDTH] IN BLOOD BY AUTOMATED COUNT: 14.1 % (ref 10–15)
GFR SERPL CREATININE-BSD FRML MDRD: >90 ML/MIN/{1.73_M2}
GLUCOSE SERPL-MCNC: 115 MG/DL (ref 70–99)
HCT VFR BLD AUTO: 37.8 % (ref 35–47)
HGB BLD-MCNC: 11.7 G/DL (ref 11.7–15.7)
INR PPP: 1.02 (ref 0.86–1.14)
MCH RBC QN AUTO: 28.4 PG (ref 26.5–33)
MCHC RBC AUTO-ENTMCNC: 31 G/DL (ref 31.5–36.5)
MCV RBC AUTO: 92 FL (ref 78–100)
PLATELET # BLD AUTO: 247 10E9/L (ref 150–450)
POTASSIUM SERPL-SCNC: 4 MMOL/L (ref 3.4–5.3)
RBC # BLD AUTO: 4.12 10E12/L (ref 3.8–5.2)
SODIUM SERPL-SCNC: 140 MMOL/L (ref 133–144)
WBC # BLD AUTO: 8.7 10E9/L (ref 4–11)

## 2021-06-17 PROCEDURE — 80048 BASIC METABOLIC PNL TOTAL CA: CPT | Performed by: INTERNAL MEDICINE

## 2021-06-17 PROCEDURE — 999N000184 HC STATISTIC TELEMETRY

## 2021-06-17 PROCEDURE — 258N000003 HC RX IP 258 OP 636: Performed by: INTERNAL MEDICINE

## 2021-06-17 PROCEDURE — 99153 MOD SED SAME PHYS/QHP EA: CPT | Performed by: INTERNAL MEDICINE

## 2021-06-17 PROCEDURE — 93458 L HRT ARTERY/VENTRICLE ANGIO: CPT | Performed by: INTERNAL MEDICINE

## 2021-06-17 PROCEDURE — 272N000001 HC OR GENERAL SUPPLY STERILE: Performed by: INTERNAL MEDICINE

## 2021-06-17 PROCEDURE — 999N000071 HC STATISTIC HEART CATH LAB OR EP LAB

## 2021-06-17 PROCEDURE — 93005 ELECTROCARDIOGRAM TRACING: CPT

## 2021-06-17 PROCEDURE — 85027 COMPLETE CBC AUTOMATED: CPT | Performed by: INTERNAL MEDICINE

## 2021-06-17 PROCEDURE — C1894 INTRO/SHEATH, NON-LASER: HCPCS | Performed by: INTERNAL MEDICINE

## 2021-06-17 PROCEDURE — C1769 GUIDE WIRE: HCPCS | Performed by: INTERNAL MEDICINE

## 2021-06-17 PROCEDURE — 99152 MOD SED SAME PHYS/QHP 5/>YRS: CPT | Performed by: INTERNAL MEDICINE

## 2021-06-17 PROCEDURE — 250N000009 HC RX 250: Performed by: INTERNAL MEDICINE

## 2021-06-17 PROCEDURE — 36415 COLL VENOUS BLD VENIPUNCTURE: CPT

## 2021-06-17 PROCEDURE — 250N000011 HC RX IP 250 OP 636: Performed by: INTERNAL MEDICINE

## 2021-06-17 PROCEDURE — 85730 THROMBOPLASTIN TIME PARTIAL: CPT | Performed by: INTERNAL MEDICINE

## 2021-06-17 PROCEDURE — 85610 PROTHROMBIN TIME: CPT | Performed by: INTERNAL MEDICINE

## 2021-06-17 RX ORDER — NALOXONE HYDROCHLORIDE 0.4 MG/ML
0.4 INJECTION, SOLUTION INTRAMUSCULAR; INTRAVENOUS; SUBCUTANEOUS
Status: DISCONTINUED | OUTPATIENT
Start: 2021-06-17 | End: 2021-06-17 | Stop reason: HOSPADM

## 2021-06-17 RX ORDER — VERAPAMIL HYDROCHLORIDE 2.5 MG/ML
INJECTION, SOLUTION INTRAVENOUS
Status: DISCONTINUED | OUTPATIENT
Start: 2021-06-17 | End: 2021-06-17 | Stop reason: HOSPADM

## 2021-06-17 RX ORDER — ASPIRIN 81 MG/1
81 TABLET ORAL DAILY
Status: DISCONTINUED | OUTPATIENT
Start: 2021-06-17 | End: 2021-06-17 | Stop reason: HOSPADM

## 2021-06-17 RX ORDER — IOPAMIDOL 755 MG/ML
INJECTION, SOLUTION INTRAVASCULAR
Status: DISCONTINUED | OUTPATIENT
Start: 2021-06-17 | End: 2021-06-17 | Stop reason: HOSPADM

## 2021-06-17 RX ORDER — FLUMAZENIL 0.1 MG/ML
0.2 INJECTION, SOLUTION INTRAVENOUS
Status: DISCONTINUED | OUTPATIENT
Start: 2021-06-17 | End: 2021-06-17 | Stop reason: HOSPADM

## 2021-06-17 RX ORDER — NALOXONE HYDROCHLORIDE 0.4 MG/ML
0.2 INJECTION, SOLUTION INTRAMUSCULAR; INTRAVENOUS; SUBCUTANEOUS
Status: DISCONTINUED | OUTPATIENT
Start: 2021-06-17 | End: 2021-06-17 | Stop reason: HOSPADM

## 2021-06-17 RX ORDER — SODIUM CHLORIDE 9 MG/ML
INJECTION, SOLUTION INTRAVENOUS CONTINUOUS
Status: DISCONTINUED | OUTPATIENT
Start: 2021-06-17 | End: 2021-06-17 | Stop reason: HOSPADM

## 2021-06-17 RX ORDER — NITROGLYCERIN 5 MG/ML
VIAL (ML) INTRAVENOUS
Status: DISCONTINUED | OUTPATIENT
Start: 2021-06-17 | End: 2021-06-17 | Stop reason: HOSPADM

## 2021-06-17 RX ORDER — FENTANYL CITRATE 50 UG/ML
INJECTION, SOLUTION INTRAMUSCULAR; INTRAVENOUS
Status: DISCONTINUED | OUTPATIENT
Start: 2021-06-17 | End: 2021-06-17 | Stop reason: HOSPADM

## 2021-06-17 RX ORDER — POTASSIUM CHLORIDE 1500 MG/1
20 TABLET, EXTENDED RELEASE ORAL
Status: DISCONTINUED | OUTPATIENT
Start: 2021-06-17 | End: 2021-06-17 | Stop reason: HOSPADM

## 2021-06-17 RX ORDER — LIDOCAINE 40 MG/G
CREAM TOPICAL
Status: DISCONTINUED | OUTPATIENT
Start: 2021-06-17 | End: 2021-06-17 | Stop reason: HOSPADM

## 2021-06-17 RX ORDER — METOPROLOL SUCCINATE 25 MG/1
50 TABLET, EXTENDED RELEASE ORAL DAILY
Qty: 90 TABLET | Refills: 3 | Status: SHIPPED | OUTPATIENT
Start: 2021-06-17 | End: 2022-04-29

## 2021-06-17 RX ORDER — ACETAMINOPHEN 325 MG/1
650 TABLET ORAL EVERY 4 HOURS PRN
Status: DISCONTINUED | OUTPATIENT
Start: 2021-06-17 | End: 2021-06-17 | Stop reason: HOSPADM

## 2021-06-17 RX ORDER — HEPARIN SODIUM 1000 [USP'U]/ML
INJECTION, SOLUTION INTRAVENOUS; SUBCUTANEOUS
Status: DISCONTINUED | OUTPATIENT
Start: 2021-06-17 | End: 2021-06-17 | Stop reason: HOSPADM

## 2021-06-17 RX ORDER — FENTANYL CITRATE 50 UG/ML
25-50 INJECTION, SOLUTION INTRAMUSCULAR; INTRAVENOUS
Status: ACTIVE | OUTPATIENT
Start: 2021-06-17 | End: 2021-06-17

## 2021-06-17 RX ORDER — ATROPINE SULFATE 0.1 MG/ML
0.5 INJECTION INTRAVENOUS
Status: DISCONTINUED | OUTPATIENT
Start: 2021-06-17 | End: 2021-06-17 | Stop reason: HOSPADM

## 2021-06-17 RX ADMIN — SODIUM CHLORIDE: 9 INJECTION, SOLUTION INTRAVENOUS at 09:35

## 2021-06-17 ASSESSMENT — MIFFLIN-ST. JEOR: SCORE: 1097.01

## 2021-06-17 NOTE — PROGRESS NOTES
Care Suites Admission Nursing Note    Patient Information  Name: Teresa Herron  Age: 66 year old  Reason for admission: heart cath  Care Suites arrival time: 0843    Visitor Information  Name: Hugh  Informed of visitor restrictions: Yes  1 visitor allowed per patient   Visitor must screen negative for COVID symptoms   Visitor must wear a mask  Waiting rooms closed to visitors    Patient Admission/Assessment   Pre-procedure assessment complete: Yes  If abnormal assessment/labs, provider notified: N/A  NPO: Yes  Medications held per instructions/orders: N/A  Consent: deferred  If applicable, pregnancy test status: deferred  Patient oriented to room: Yes  Education/questions answered: Yes  Plan/other: proceed as scheduled    Discharge Planning  Discharge name/phone number:  Hugh [phone]  Overnight post sedation caregiver: Hugh  Discharge location: home    Jennifer Hanks RN       PATIENT/VISITOR WELLNESS SCREENING    Step 1 Patient Screening    1. In the last month, have you been in contact with someone who was confirmed or suspected to have Coronavirus/COVID-19? No    2. Do you have the following symptoms?  Fever/Chills? No   Cough? No   Shortness of breath? No   New loss of taste or smell? No  Sore throat? No  Muscle or body aches? No  Headaches? No  Fatigue? No  Vomiting or diarrhea? No    Step 2 Visitor Screening    1. Name of Visitor (1 visitor per patient): Hugh    2. In the last month, have you been in contact with someone who was confirmed or suspected to have Coronavirus/COVID-19? No    3. Do you have the following symptoms?  Fever/Chills? No   Cough? No   Shortness of breath? No   Skin rash? No   Loss of taste or smell? No  Sore throat? No  Runny or stuffy nose? No  Muscle or body aches? No  Headaches? No  Fatigue? No  Vomiting or diarrhea? No    If the visitor has positive symptoms, notify supervisor/manger  Per policy, the visitor will need to leave the facility     Step 3 Refer to  logic grid below for actions    NO SYMPTOM(S)    ACTIONS:  1. Standard rooming process  2. Provider to assess per normal protocol  3. Implement precautions as needed and per guidelines     POSITIVE SYMPTOM(S)  If positive for ANY of the following symptoms: fever, cough, shortness of breath, rash    ACTION:  1. Continue to have the patient wear a mask   2. Room patient as soon as possible  3. Don appropriate PPE when entering room  4. Provider evaluation

## 2021-06-17 NOTE — PROGRESS NOTES
Care Suites Post Procedure Note    Patient Information  Name: Teresa Herron  Age: 66 year old    Post Procedure  Time patient returned to Care Suites: 1335  Concerns/abnormal assessment: NA  If abnormal assessment, provider notified: N/A  Plan/Other: Right radial site CDI, soft, 11cc air in TR Band, denies pain, VSS.     Jennifer Hanks RN

## 2021-06-17 NOTE — PRE-PROCEDURE
GENERAL PRE-PROCEDURE:   Procedure:  Cor angio possible PCI    Written consent obtained?: Yes    Risks and benefits: Risks, benefits and alternatives were discussed    Consent given by:  Patient  Patient states understanding of procedure being performed: Yes    Patient's understanding of procedure matches consent: Yes    Procedure consent matches procedure scheduled: Yes    Expected level of sedation:  Moderate  Appropriately NPO:  Yes  ASA Class:  Class 3- Severe systemic disease, definite functional limitations  Mallampati  :  Grade 1- soft palate, uvula, tonsillar pillars, and posterior pharyngeal wall visible  Lungs:  Other (comment)  Lung exam comment:  Normal resp rate and effort  Heart:  RRR  History & Physical reviewed:  History and physical reviewed and no updates needed  Statement of review:  I have reviewed the lab findings, diagnostic data, medications, and the plan for sedation

## 2021-06-17 NOTE — PROGRESS NOTES
Care Suites Discharge Nursing Note    Patient Information  Name: Teresa Herron  Age: 66 year old    Discharge Education:  Discharge instructions reviewed: Yes  Additional education/resources provided: n/a  Patient/patient representative verbalizes understanding: Yes  Patient discharging on new medications: Yes  Medication education completed: Yes    Discharge Plans:   Discharge location: home  Discharge ride contacted: Yes  Approximate discharge time: 1715    Discharge Criteria:  Discharge criteria met and vital signs stable: Yes    Patient Belongs:  Patient belongings returned to patient: Yes    Latasha Hernandez RN

## 2021-06-17 NOTE — DISCHARGE INSTRUCTIONS
Cardiac Angiogram Discharge Instructions - Radial (WRIST)    After you go home:      Have an adult stay with you until tomorrow.    Drink extra fluids for 2 days.    You may resume your normal diet.    No smoking       For 24 hours - due to the sedation you received:    Relax and take it easy.    Do NOT make any important or legal decisions.    Do NOT drive or operate machines at home or at work.    Do NOT drink alcohol.    Care of Wrist Puncture Site:      For the first 24 hrs - check the puncture site every 1-2 hours while awake.    It is normal to have soreness at the puncture site and mild tingling in your hand for up to 3 days.    Remove the bandaid after 24 hours. If there is minor oozing, apply another bandaid and remove it after 12 hours.    You may shower tomorrow.  Do NOT take a bath, or use a hot tub or pool for at least 3 days. Do NOT scrub the site. Do not use lotion or powder near the puncture site.           Activity:        For 2 days:     do not use your hand or arm to support your weight (such as rising from a chair)     do not bend your wrist (such as lifting a garage door).    do not lift more than 5 pounds or exercise your arm (such as tennis, golf or bowling).    Do NOT do any heavy activity such as exercise, lifting, or straining.     Bleeding:      If you start bleeding from the site in your wrist, sit down and press firmly on/above the site for 10 minutes.     Once bleeding stops, keep arm still for 2 hours.     Call [facility] as soon as you can.       Call 911 right away if you have heavy bleeding or bleeding that does not stop.      Medicines:      If you are taking an antiplatelet medication such as Plavix, Brilinta or Effient, do not stop taking it until you talk to your cardiologist.        If you are on Metformin (Glucophage), do not restart it until you have blood tests (within 2 to 3 days after discharge).  After you have your blood drawn, you may restart the Metformin.     Take  your medications, including blood thinners, unless your provider tells you not to.      If you take Coumadin (Warfarin), have your INR checked by your provider in  3-5 days. Call your clinic to schedule this.    If you have stopped any medicines, check with your provider about when to restart them.    Follow Up Appointments:      Follow up with [facility] Nurse Practitioner at [facility] of patient preference in 7-10 days.    Call the clinic if:      You have a large or growing hard lump around the site.    The site is red, swollen, hot or tender.    Blood or fluid is draining from the site.    You have chills or a fever greater than 101 F (38 C).    Your arm feels numb, cool or changes color.    You have hives, a rash or unusual itching.    Any questions or concerns.          [facility] [place]:    [phone] [facility] (7 days a week)

## 2021-06-24 ENCOUNTER — VIRTUAL VISIT (OUTPATIENT)
Dept: CARDIOLOGY | Facility: CLINIC | Age: 66
End: 2021-06-24
Payer: COMMERCIAL

## 2021-06-24 DIAGNOSIS — I10 BENIGN ESSENTIAL HYPERTENSION: Primary | ICD-10-CM

## 2021-06-24 DIAGNOSIS — E78.5 HYPERLIPIDEMIA LDL GOAL <70: ICD-10-CM

## 2021-06-24 DIAGNOSIS — Z11.59 ENCOUNTER FOR SCREENING FOR OTHER VIRAL DISEASES: ICD-10-CM

## 2021-06-24 LAB — INTERPRETATION ECG - MUSE: NORMAL

## 2021-06-24 PROCEDURE — 99213 OFFICE O/P EST LOW 20 MIN: CPT | Mod: 95 | Performed by: PHYSICIAN ASSISTANT

## 2021-06-24 NOTE — LETTER
6/24/2021    Yoni Baxter MD  [address]  [place] [address]    RE: Teresa Herron       Dear Colleague,    I had the pleasure of seeing Teresa Herron in the [facility].    Teresa is a 66 year old who is being evaluated via a billable video visit.      How would you like to obtain your AVS? MyChart  If the video visit is dropped, the invitation should be resent by: Text to cell phone: [phone] alex[employer] [place].  Will anyone else be joining your video visit? No     Review Of Systems  Skin: NEGATIVE  Eyes:Ears/Nose/Throat: NEGATIVE  Respiratory: some SHETTY   Cardiovascular:NEGATIVE  Gastrointestinal:reflux  Genitourinary:NEGATIVE   Musculoskeletal: arthritis, knee  Neurologic: NEGATIVE  Psychiatric: NEGATIVE  Hematologic/Lymphatic/Immunologic: NEGATIVE  Endocrine:  Diabetic  Vitals - Patient Reported  Systolic (Patient Reported): (n/a)  Diastolic (Patient Reported): (n/a)  Weight (Patient Reported): 68.5 kg (151 lb)  Pulse (Patient Reported): (n/a)    Telephone number of patient: [phone]    Migdalia Dowell LPN    Video-Visit Details    Type of service:  Video Visit    Originating Location (pt. Location): Home    Distant Location (provider location):  [facility] [place]     Platform used for Video Visit: [employer]     Send link to Annie vaughan [phone]    -----------------------------------------------------------------------------------------------------------------------------    Primary Cardiologist: Dr. Herring    Reason for Video Visit: post angio follow up    HPI:  Teresa is a 66 year old female who was referred to Dr. Herring recently with CP and abnormal stress echo (apical and inferior WMA). She was arranged for diagnostic coronary angiogram. CXR was also completed which showed no obstructive CAD.     Teresa also has history of HTN, HLD ( but now taking simvastatin regularly;  declined rosuvastatin due to cost), and T2DM.     Fortunately her coronary angiogram showed no obstructive lesions. She has mild nonobstructive CAD. LVEDP was normal. Her metoprolol was increased and she was arranged to follow up with me. This was scheduled as a virtual visit.     She is doing well. She thinks her SHETTY is due to deconditioning. She will plan to walk regularly to build her stamina. She has no issues or pain at the access site from her recent angiogram.     ROS:  12-pt ROS is negative except for as noted above.     Physical Exam:  A limited exam was conducted via video.  Constitutional:  Patient is pleasant, alert, cooperative, and in NAD.  HEENT:  NCAT. EOM's intact.   Neck:  CVP appears normal.   Pulmonary: Normal respiratory effort.   Extremities: No edema, erythema, cyanosis appreciated.  Skin:  No rashes or lesions appreciated.   Neurological:  No gross motor or sensory deficits.   Psych: Appropriate affect.       A/P:   Teresa is a pleasant 66 year old female with past medical history notable for mild non-obstructive CAD, recent diagnosis of T2DM (hA1c 7.1%; pt is trying lifestyle changes currently), HTN, and HLD ( but now taking simvastatin regularly now; declined rosuvastatin due to cost).     We talked about importance of mediterranean diet, regular exercise, and diabetes control. I would like to check Lipid panel/ALT in the 4-6 months. She reports she needs hA1c checked in 6 months and she will complete these labs at that time.  We will see her back in cardiology clinic in 1 year.     Video start time: 4: 30 PM  Video end time: 4:50 PM  Originating Location (pt. Location): home  Distant Location (provider location):  [facility]   Mode of Communication:  Video Conference via [employer] phone application       This visit is being conducted as a virtual visit due to the emphasis on mitigation of the COVID-19 virus pandemic. The clinician has decided  that the risk of an in-office visit outweighs the benefit for this patient. The rest of the comprehensive physical examination is deferred due to public health emergency video visit restrictions.         Amy Cagle PA-C   6/24/2021  Pager: [phone]    cc:   No referring provider defined for this encounter.

## 2021-06-24 NOTE — PROGRESS NOTES
Teresa is a 66 year old who is being evaluated via a billable video visit.      How would you like to obtain your AVS? MyChart  If the video visit is dropped, the invitation should be resent by: Text to cell phone: [phone] [employer] [place].  Will anyone else be joining your video visit? No     Review Of Systems  Skin: NEGATIVE  Eyes:Ears/Nose/Throat: NEGATIVE  Respiratory: some SHETTY   Cardiovascular:NEGATIVE  Gastrointestinal:reflux  Genitourinary:NEGATIVE   Musculoskeletal: arthritis, knee  Neurologic: NEGATIVE  Psychiatric: NEGATIVE  Hematologic/Lymphatic/Immunologic: NEGATIVE  Endocrine:  Diabetic  Vitals - Patient Reported  Systolic (Patient Reported): (n/a)  Diastolic (Patient Reported): (n/a)  Weight (Patient Reported): 68.5 kg (151 lb)  Pulse (Patient Reported): (n/a)    Telephone number of patient: [phone]    Migdalia Dowell LPN    Video-Visit Details    Type of service:  Video Visit    Originating Location (pt. Location): Home    Distant Location (provider location):  [facility]     Platform used for Video Visit: [employer]KS     Send link to Annie vaughan [phone]    -----------------------------------------------------------------------------------------------------------------------------    Primary Cardiologist: Dr. Herring    Reason for Video Visit: post angio follow up    HPI:  Teresa is a 66 year old female who was referred to Dr. Herring recently with CP and abnormal stress echo (apical and inferior WMA). She was arranged for diagnostic coronary angiogram. CXR was also completed which showed no obstructive CAD.     Teresa also has history of HTN, HLD ( but now taking simvastatin regularly; declined rosuvastatin due to cost), and T2DM.     Fortunately her coronary angiogram showed no obstructive lesions. She has mild nonobstructive CAD. LVEDP was normal. Her metoprolol was increased and she was arranged to follow up with me. This was  scheduled as a virtual visit.     She is doing well. She thinks her SHETTY is due to deconditioning. She will plan to walk regularly to build her stamina. She has no issues or pain at the access site from her recent angiogram.     ROS:  12-pt ROS is negative except for as noted above.     Physical Exam:  A limited exam was conducted via video.  Constitutional:  Patient is pleasant, alert, cooperative, and in NAD.  HEENT:  NCAT. EOM's intact.   Neck:  CVP appears normal.   Pulmonary: Normal respiratory effort.   Extremities: No edema, erythema, cyanosis appreciated.  Skin:  No rashes or lesions appreciated.   Neurological:  No gross motor or sensory deficits.   Psych: Appropriate affect.       A/P:   Teresa is a pleasant 66 year old female with past medical history notable for mild non-obstructive CAD, recent diagnosis of T2DM (hA1c 7.1%; pt is trying lifestyle changes currently), HTN, and HLD ( but now taking simvastatin regularly now; declined rosuvastatin due to cost).     We talked about importance of mediterranean diet, regular exercise, and diabetes control. I would like to check Lipid panel/ALT in the 4-6 months. She reports she needs hA1c checked in 6 months and she will complete these labs at that time.  We will see her back in cardiology clinic in 1 year.     Video start time: 4: 30 PM  Video end time: 4:50 PM  Originating Location (pt. Location): home  Distant Location (provider location):  [facility]   Mode of Communication:  Video Conference via [employer] phone application       This visit is being conducted as a virtual visit due to the emphasis on mitigation of the COVID-19 virus pandemic. The clinician has decided that the risk of an in-office visit outweighs the benefit for this patient. The rest of the comprehensive physical examination is deferred due to public health emergency video visit restrictions.         Amy Cagle PA-C    6/24/2021  Pager: [phone]

## 2021-06-26 NOTE — TELEPHONE ENCOUNTER
Prescription approved per [facility] Refill Protocol.  Silvana Loco RN      
Reason for Call:  Medication or medication refill:htzc,lisinpril, vit d ,simvastation    Do you use a [facility]?  Name of the pharmacy and phone number for the current request:     [facility] [address] [place] [id number]      Name of the medication requested:     Other request:     Can we leave a detailed message on this number? YES    Phone number patient can be reached at: Home number on file [phone] (home)    Best Time: any    Call taken on 4/11/2017 at 7:15 AM by Yue Holcomb      
Screening for viral disease

## 2021-07-20 ENCOUNTER — TELEPHONE (OUTPATIENT)
Dept: FAMILY MEDICINE | Facility: CLINIC | Age: 66
End: 2021-07-20

## 2021-07-20 DIAGNOSIS — E11.9 TYPE 2 DIABETES MELLITUS WITHOUT COMPLICATION, WITHOUT LONG-TERM CURRENT USE OF INSULIN (H): Primary | ICD-10-CM

## 2021-07-20 DIAGNOSIS — E78.5 HYPERLIPIDEMIA LDL GOAL <130: ICD-10-CM

## 2021-07-20 DIAGNOSIS — I10 BENIGN ESSENTIAL HYPERTENSION: ICD-10-CM

## 2021-07-20 NOTE — TELEPHONE ENCOUNTER
Reason for Call: Request for an order or referral:    Order or referral being requested: labs, kidney, A1C, cholesterol    Date needed: as soon as possible    Has the patient been seen by the PCP for this problem? NO    Additional comments: does he need to see her before he'll put in these tests- can communicate through [employer]    Phone number Patient can be reached at:  Cell number on file:    Telephone Information:   Mobile [phone]       Best Time:  any    Can we leave a detailed message on this number?  YES    Call taken on 7/20/2021 at 9:57 AM by Irlanda Jin

## 2021-07-21 NOTE — TELEPHONE ENCOUNTER
Last OV 5/20/21 - per labs return to recheck some of the labs in 6 mos.     Please advise - pt requesting labs be ordered     Alisha MEDINA RN

## 2021-07-22 NOTE — TELEPHONE ENCOUNTER
Pt would like to check labs before 6 months since she wants to know how her BS are. Pt also asked if she could have CBC ordered. In the past, abnormal results for why she would like to recheck.     Future lab only appt was scheduled 08/04/2021  CBC order pended. Please approve if appropriate.

## 2021-08-04 ENCOUNTER — LAB (OUTPATIENT)
Dept: LAB | Facility: CLINIC | Age: 66
End: 2021-08-04
Payer: COMMERCIAL

## 2021-08-04 DIAGNOSIS — E78.5 HYPERLIPIDEMIA LDL GOAL <130: ICD-10-CM

## 2021-08-04 DIAGNOSIS — E11.9 TYPE 2 DIABETES MELLITUS WITHOUT COMPLICATION, WITHOUT LONG-TERM CURRENT USE OF INSULIN (H): ICD-10-CM

## 2021-08-04 DIAGNOSIS — I10 BENIGN ESSENTIAL HYPERTENSION: ICD-10-CM

## 2021-08-04 DIAGNOSIS — Z11.59 ENCOUNTER FOR SCREENING FOR OTHER VIRAL DISEASES: ICD-10-CM

## 2021-08-04 DIAGNOSIS — E78.5 HYPERLIPIDEMIA LDL GOAL <70: ICD-10-CM

## 2021-08-04 LAB
BASOPHILS # BLD AUTO: 0 10E3/UL (ref 0–0.2)
BASOPHILS NFR BLD AUTO: 0 %
EOSINOPHIL # BLD AUTO: 0.1 10E3/UL (ref 0–0.7)
EOSINOPHIL NFR BLD AUTO: 1 %
ERYTHROCYTE [DISTWIDTH] IN BLOOD BY AUTOMATED COUNT: 14.8 % (ref 10–15)
HBA1C MFR BLD: 6.2 % (ref 0–5.6)
HCT VFR BLD AUTO: 37.4 % (ref 35–47)
HGB BLD-MCNC: 11.9 G/DL (ref 11.7–15.7)
LYMPHOCYTES # BLD AUTO: 3.2 10E3/UL (ref 0.8–5.3)
LYMPHOCYTES NFR BLD AUTO: 41 %
MCH RBC QN AUTO: 29 PG (ref 26.5–33)
MCHC RBC AUTO-ENTMCNC: 31.8 G/DL (ref 31.5–36.5)
MCV RBC AUTO: 91 FL (ref 78–100)
MONOCYTES # BLD AUTO: 0.5 10E3/UL (ref 0–1.3)
MONOCYTES NFR BLD AUTO: 7 %
NEUTROPHILS # BLD AUTO: 4 10E3/UL (ref 1.6–8.3)
NEUTROPHILS NFR BLD AUTO: 51 %
PLATELET # BLD AUTO: 267 10E3/UL (ref 150–450)
RBC # BLD AUTO: 4.1 10E6/UL (ref 3.8–5.2)
WBC # BLD AUTO: 7.9 10E3/UL (ref 4–11)

## 2021-08-04 PROCEDURE — 83036 HEMOGLOBIN GLYCOSYLATED A1C: CPT

## 2021-08-04 PROCEDURE — 80061 LIPID PANEL: CPT

## 2021-08-04 PROCEDURE — 82043 UR ALBUMIN QUANTITATIVE: CPT

## 2021-08-04 PROCEDURE — 84460 ALANINE AMINO (ALT) (SGPT): CPT

## 2021-08-04 PROCEDURE — 36415 COLL VENOUS BLD VENIPUNCTURE: CPT | Performed by: PHYSICIAN ASSISTANT

## 2021-08-04 PROCEDURE — 80048 BASIC METABOLIC PNL TOTAL CA: CPT

## 2021-08-04 PROCEDURE — 85025 COMPLETE CBC W/AUTO DIFF WBC: CPT

## 2021-08-05 ENCOUNTER — TELEPHONE (OUTPATIENT)
Dept: CARDIOLOGY | Facility: CLINIC | Age: 66
End: 2021-08-05

## 2021-08-05 LAB
ALT SERPL W P-5'-P-CCNC: 19 U/L (ref 0–50)
ANION GAP SERPL CALCULATED.3IONS-SCNC: 1 MMOL/L (ref 3–14)
BUN SERPL-MCNC: 12 MG/DL (ref 7–30)
CALCIUM SERPL-MCNC: 9 MG/DL (ref 8.5–10.1)
CHLORIDE BLD-SCNC: 112 MMOL/L (ref 94–109)
CHOLEST SERPL-MCNC: 177 MG/DL
CO2 SERPL-SCNC: 28 MMOL/L (ref 20–32)
CREAT SERPL-MCNC: 0.68 MG/DL (ref 0.52–1.04)
CREAT UR-MCNC: 40 MG/DL
FASTING STATUS PATIENT QL REPORTED: YES
GFR SERPL CREATININE-BSD FRML MDRD: >90 ML/MIN/1.73M2
GLUCOSE BLD-MCNC: 121 MG/DL (ref 70–99)
HDLC SERPL-MCNC: 55 MG/DL
LDLC SERPL CALC-MCNC: 95 MG/DL
MICROALBUMIN UR-MCNC: <5 MG/L
MICROALBUMIN/CREAT UR: NORMAL MG/G{CREAT}
NONHDLC SERPL-MCNC: 122 MG/DL
POTASSIUM BLD-SCNC: 4.6 MMOL/L (ref 3.4–5.3)
SODIUM SERPL-SCNC: 141 MMOL/L (ref 133–144)
TRIGL SERPL-MCNC: 135 MG/DL

## 2021-08-05 NOTE — LETTER
August 5, 2021       TO: Teresa Herron   [address]  [facility] [address]       Dear Ms. Herron,    The results of your recent Laboratory tests.    Component      Latest Ref Rng & Units 8/4/2021   Cholesterol      <200 mg/dL 177   Triglycerides      <150 mg/dL 135   HDL Cholesterol      >=50 mg/dL 55   LDL Cholesterol Calculated      <=100 mg/dL 95   Non HDL Cholesterol      <130 mg/dL 122   Patient Fasting > 8hrs?       Yes     Looks good. No new changes.     Thanks,     Amy          Sincerely,    [facility]

## 2021-08-05 NOTE — TELEPHONE ENCOUNTER
Lipid panel planned for December drawn at PCP with August visit. Patient stated at June visit she was taking simvastatin regularly now; declined rosuvastatin due to cost.    Component      Latest Ref Rng & Units 8/4/2021   Cholesterol      <200 mg/dL 177   Triglycerides      <150 mg/dL 135   HDL Cholesterol      >=50 mg/dL 55   LDL Cholesterol Calculated      <=100 mg/dL 95   Non HDL Cholesterol      <130 mg/dL 122   Patient Fasting > 8hrs?       Yes   ALT      0 - 50 U/L 19     Will message ADA Amy Cagle to review      8/5/2021 1518 reply from ADA Amy Cagle:  Looks good. No new changes.     Thanks,     Amy      Results letter mailed to patient

## 2021-08-06 ENCOUNTER — MYC MEDICAL ADVICE (OUTPATIENT)
Dept: FAMILY MEDICINE | Facility: CLINIC | Age: 66
End: 2021-08-06

## 2021-08-07 PROBLEM — I25.10 ASCVD (ARTERIOSCLEROTIC CARDIOVASCULAR DISEASE): Status: ACTIVE | Noted: 2021-06-01

## 2021-08-07 NOTE — RESULT ENCOUNTER NOTE
Hello,    As you can see for the most part the labs look fine.  Your diabetes test or hemoglobin a1c is lower which is great.  No medications are needed, but please continue to exercise and get your weight down for this.Your CBC or complete blood count is normal.  Your blood salts and kidney tests are fine.Your cholesterol overall is fine but we would like to see the LDL or bad cholesterol under 70.  I believe you are currently taking simvastatin and I would like to change this to atorvastatin or Lipitor.  This is more potent and is generic so cost should not be a big change.  Please send me a note back and let me know if this is okay.    Yoni Baxter M.D.

## 2021-10-03 ENCOUNTER — HEALTH MAINTENANCE LETTER (OUTPATIENT)
Age: 66
End: 2021-10-03

## 2022-03-15 ENCOUNTER — TRANSFERRED RECORDS (OUTPATIENT)
Dept: HEALTH INFORMATION MANAGEMENT | Facility: CLINIC | Age: 67
End: 2022-03-15
Payer: COMMERCIAL

## 2022-03-20 ENCOUNTER — HEALTH MAINTENANCE LETTER (OUTPATIENT)
Age: 67
End: 2022-03-20

## 2022-04-29 ENCOUNTER — OFFICE VISIT (OUTPATIENT)
Dept: FAMILY MEDICINE | Facility: CLINIC | Age: 67
End: 2022-04-29
Payer: COMMERCIAL

## 2022-04-29 VITALS
RESPIRATION RATE: 16 BRPM | HEIGHT: 56 IN | OXYGEN SATURATION: 99 % | BODY MASS INDEX: 33.52 KG/M2 | DIASTOLIC BLOOD PRESSURE: 93 MMHG | TEMPERATURE: 97 F | SYSTOLIC BLOOD PRESSURE: 163 MMHG | HEART RATE: 65 BPM | WEIGHT: 149 LBS

## 2022-04-29 DIAGNOSIS — E78.5 HYPERLIPIDEMIA LDL GOAL <130: ICD-10-CM

## 2022-04-29 DIAGNOSIS — M25.511 ACUTE PAIN OF RIGHT SHOULDER: Primary | ICD-10-CM

## 2022-04-29 DIAGNOSIS — R94.39 ABNORMAL CARDIOVASCULAR STRESS TEST: ICD-10-CM

## 2022-04-29 DIAGNOSIS — I10 BENIGN ESSENTIAL HYPERTENSION: ICD-10-CM

## 2022-04-29 PROCEDURE — 99213 OFFICE O/P EST LOW 20 MIN: CPT | Performed by: INTERNAL MEDICINE

## 2022-04-29 RX ORDER — METOPROLOL SUCCINATE 25 MG/1
50 TABLET, EXTENDED RELEASE ORAL DAILY
Qty: 90 TABLET | Refills: 3 | Status: SHIPPED | OUTPATIENT
Start: 2022-04-29 | End: 2022-05-02

## 2022-04-29 RX ORDER — HYDROCHLOROTHIAZIDE 12.5 MG/1
12.5 TABLET ORAL DAILY
Qty: 90 TABLET | Refills: 3 | Status: SHIPPED | OUTPATIENT
Start: 2022-04-29 | End: 2022-05-02

## 2022-04-29 RX ORDER — IRBESARTAN 300 MG/1
300 TABLET ORAL AT BEDTIME
Qty: 90 TABLET | Refills: 3 | Status: SHIPPED | OUTPATIENT
Start: 2022-04-29 | End: 2022-05-02

## 2022-04-29 RX ORDER — SIMVASTATIN 40 MG
40 TABLET ORAL AT BEDTIME
Qty: 90 TABLET | Refills: 3 | Status: SHIPPED | OUTPATIENT
Start: 2022-04-29 | End: 2022-05-02

## 2022-04-29 ASSESSMENT — PAIN SCALES - GENERAL: PAINLEVEL: SEVERE PAIN (6)

## 2022-04-29 NOTE — PROGRESS NOTES
Presents for acute pain of her right shoulder.  There was no trauma or injury.  Its been about 5 weeks.  The pain starts in the mid biceps area and then goes up into the shoulder.  Nothing on the left side.  It does not radiate down.  She has not been ill.  She has had no falls.  She has never had this before.    Additionally, she has hypertension and high cholesterol.  She has been out of her blood pressure medications for the last several days.    Past Medical History:   Diagnosis Date     Arthritis of knee, degenerative     seen by Dr. Jones 2017 and cortisone did not help     ASCVD (arteriosclerotic cardiovascular disease) 06/2021    on angio after pos est for collazo.  Non obstructive cad, med mgmt     Chest pain 04/2015    nl est echo     Dizziness years     GERD (gastroesophageal reflux disease)      H/O colonoscopy 2014    tics, tortuous, fu 5 years     HTN (hypertension) 1988     Hypercholesteraemia 2010     Screening for osteoporosis 10/2020    nl dexa     Type II or unspecified type diabetes mellitus without mention of complication, not stated as uncontrolled     not on meds as of 2018     Vitamin D deficiency      Past Surgical History:   Procedure Laterality Date     COLONOSCOPY N/A 11/18/2014    Procedure: COLONOSCOPY;  Surgeon: Yoni Carvajal MD;  Location:  GI     CV HEART CATHETERIZATION WITH POSSIBLE INTERVENTION N/A 6/17/2021    Procedure: Coronary Angiogram;  Surgeon: Renata Herring MD;  Location:  HEART CARDIAC CATH LAB     CV LEFT HEART CATH N/A 6/17/2021    Procedure: Left Heart Cath;  Surgeon: Renata Herring MD;  Location:  HEART CARDIAC CATH LAB     fibroid surgery  2000     HYSTERECTOMY, PAP NO LONGER INDICATED  2006    estrada/bso due to pain and bleeding     LAPAROSCOPIC APPENDECTOMY  5/19/2014    Procedure: LAPAROSCOPIC APPENDECTOMY;  Surgeon: Will Barth MD;  Location:  SD     Social History     Socioeconomic History     Marital status:      Spouse name: Not on  file     Number of children: 1     Years of education: Not on file     Highest education level: Not on file   Occupational History     Occupation: work dietary, in kitchen     Employer: [employer]   Tobacco Use     Smoking status: Never Smoker     Smokeless tobacco: Never Used   Substance and Sexual Activity     Alcohol use: No     Alcohol/week: 0.0 standard drinks     Drug use: No     Sexual activity: Not Currently   Other Topics Concern     Not on file   Social History Narrative     Not on file     Social Determinants of Health     Financial Resource Strain: Not on file   Food Insecurity: Not on file   Transportation Needs: Not on file   Physical Activity: Not on file   Stress: Not on file   Social Connections: Not on file   Intimate Partner Violence: Not on file   Housing Stability: Not on file     Current Outpatient Medications   Medication Sig Dispense Refill     aspirin 81 MG EC tablet Take 81 mg by mouth as needed for moderate pain       cholecalciferol (VITAMIN D3) 25 mcg (1000 units) capsule Take 1 capsule by mouth daily       famotidine (PEPCID) 10 MG tablet Take 10 mg by mouth as needed       hydrochlorothiazide (HYDRODIURIL) 12.5 MG tablet Take 1 tablet (12.5 mg) by mouth daily 90 tablet 3     irbesartan (AVAPRO) 300 MG tablet Take 1 tablet (300 mg) by mouth At Bedtime 90 tablet 3     metoprolol succinate ER (TOPROL-XL) 25 MG 24 hr tablet Take 2 tablets (50 mg) by mouth daily 90 tablet 3     omega 3 1000 MG CAPS Take 1 g by mouth daily 90 capsule      simvastatin (ZOCOR) 40 MG tablet Take 1 tablet (40 mg) by mouth At Bedtime 90 tablet 3     nitroGLYcerin (NITROSTAT) 0.4 MG sublingual tablet For chest pain place 1 tablet under the tongue every 5 minutes for 3 doses. If symptoms persist 5 minutes after 1st dose call 911. (Patient not taking: Reported on 4/29/2022) 30 tablet 11     No Known Allergies  FAMILY HISTORY NOTED AND REVIEWED    REVIEW OF SYSTEMS: above    PHYSICAL EXAM    BP (!)  "163/93 (BP Location: Left arm, Patient Position: Chair, Cuff Size: Adult Large)   Pulse 65   Temp 97  F (36.1  C) (Tympanic)   Resp 16   Ht 1.422 m (4' 8\")   Wt 67.6 kg (149 lb)   SpO2 99%   Breastfeeding No   BMI 33.41 kg/m      Patient appears non toxic  Right shoulder and upper arm are not red, tender, swollen, or warm.  She has decreased range of motion of the right shoulder with pain with abduction.  Her CMS is intact in her right upper extremity.  The elbow is normal.  The hand appears normal.    ASSESSMENT:  1. Right shoulder pain, ?rot cuff vs frozen shoulder  2. Hypertension, up now off meds  3. Elevated cholesterol, off meds    PLAN:  Resume meds  See ortho  Has follow up complete physical exam end of May with me    Yoni Baxter M.D.        "

## 2022-05-02 RX ORDER — METOPROLOL SUCCINATE 25 MG/1
50 TABLET, EXTENDED RELEASE ORAL DAILY
Qty: 90 TABLET | Refills: 3 | Status: SHIPPED | OUTPATIENT
Start: 2022-05-02 | End: 2022-06-09

## 2022-05-02 RX ORDER — SIMVASTATIN 40 MG
40 TABLET ORAL AT BEDTIME
Qty: 90 TABLET | Refills: 3 | Status: SHIPPED | OUTPATIENT
Start: 2022-05-02 | End: 2023-03-03

## 2022-05-02 RX ORDER — IRBESARTAN 300 MG/1
300 TABLET ORAL AT BEDTIME
Qty: 90 TABLET | Refills: 3 | Status: SHIPPED | OUTPATIENT
Start: 2022-05-02 | End: 2023-03-03

## 2022-05-02 RX ORDER — HYDROCHLOROTHIAZIDE 12.5 MG/1
12.5 TABLET ORAL DAILY
Qty: 90 TABLET | Refills: 3 | Status: SHIPPED | OUTPATIENT
Start: 2022-05-02 | End: 2023-03-03

## 2022-05-03 ENCOUNTER — HOSPITAL ENCOUNTER (EMERGENCY)
Facility: CLINIC | Age: 67
Discharge: HOME | End: 2022-05-03
Attending: EMERGENCY MEDICINE | Admitting: EMERGENCY MEDICINE
Payer: COMMERCIAL

## 2022-05-03 ENCOUNTER — APPOINTMENT (OUTPATIENT)
Dept: CT IMAGING | Facility: CLINIC | Age: 67
End: 2022-05-03
Attending: EMERGENCY MEDICINE
Payer: COMMERCIAL

## 2022-05-03 VITALS
HEIGHT: 56 IN | TEMPERATURE: 97.5 F | HEART RATE: 70 BPM | DIASTOLIC BLOOD PRESSURE: 80 MMHG | BODY MASS INDEX: 33.07 KG/M2 | WEIGHT: 147 LBS | RESPIRATION RATE: 18 BRPM | SYSTOLIC BLOOD PRESSURE: 156 MMHG | OXYGEN SATURATION: 98 %

## 2022-05-03 DIAGNOSIS — I10 HYPERTENSION, UNSPECIFIED TYPE: ICD-10-CM

## 2022-05-03 DIAGNOSIS — H61.21 IMPACTED CERUMEN OF RIGHT EAR: ICD-10-CM

## 2022-05-03 DIAGNOSIS — H81.12 BENIGN PAROXYSMAL POSITIONAL VERTIGO, LEFT: ICD-10-CM

## 2022-05-03 LAB
ALBUMIN SERPL-MCNC: 3.5 G/DL (ref 3.4–5)
ALP SERPL-CCNC: 88 U/L (ref 40–150)
ALT SERPL W P-5'-P-CCNC: 19 U/L (ref 0–50)
ANION GAP SERPL CALCULATED.3IONS-SCNC: 5 MMOL/L (ref 3–14)
AST SERPL W P-5'-P-CCNC: 14 U/L (ref 0–45)
ATRIAL RATE - MUSE: 67 BPM
BASOPHILS # BLD AUTO: 0 10E3/UL (ref 0–0.2)
BASOPHILS NFR BLD AUTO: 0 %
BILIRUB SERPL-MCNC: 0.6 MG/DL (ref 0.2–1.3)
BUN SERPL-MCNC: 12 MG/DL (ref 7–30)
CALCIUM SERPL-MCNC: 9.5 MG/DL (ref 8.5–10.1)
CHLORIDE BLD-SCNC: 108 MMOL/L (ref 94–109)
CO2 SERPL-SCNC: 26 MMOL/L (ref 20–32)
CREAT SERPL-MCNC: 0.61 MG/DL (ref 0.52–1.04)
DIASTOLIC BLOOD PRESSURE - MUSE: NORMAL MMHG
EOSINOPHIL # BLD AUTO: 0.1 10E3/UL (ref 0–0.7)
EOSINOPHIL NFR BLD AUTO: 1 %
ERYTHROCYTE [DISTWIDTH] IN BLOOD BY AUTOMATED COUNT: 14.1 % (ref 10–15)
GFR SERPL CREATININE-BSD FRML MDRD: >90 ML/MIN/1.73M2
GLUCOSE BLD-MCNC: 124 MG/DL (ref 70–99)
HCT VFR BLD AUTO: 42.4 % (ref 35–47)
HGB BLD-MCNC: 12.9 G/DL (ref 11.7–15.7)
HOLD SPECIMEN: NORMAL
IMM GRANULOCYTES # BLD: 0 10E3/UL
IMM GRANULOCYTES NFR BLD: 0 %
INTERPRETATION ECG - MUSE: NORMAL
LYMPHOCYTES # BLD AUTO: 3.2 10E3/UL (ref 0.8–5.3)
LYMPHOCYTES NFR BLD AUTO: 31 %
MCH RBC QN AUTO: 27.6 PG (ref 26.5–33)
MCHC RBC AUTO-ENTMCNC: 30.4 G/DL (ref 31.5–36.5)
MCV RBC AUTO: 91 FL (ref 78–100)
MONOCYTES # BLD AUTO: 0.4 10E3/UL (ref 0–1.3)
MONOCYTES NFR BLD AUTO: 4 %
NEUTROPHILS # BLD AUTO: 6.5 10E3/UL (ref 1.6–8.3)
NEUTROPHILS NFR BLD AUTO: 64 %
NRBC # BLD AUTO: 0 10E3/UL
NRBC BLD AUTO-RTO: 0 /100
P AXIS - MUSE: 56 DEGREES
PLATELET # BLD AUTO: 301 10E3/UL (ref 150–450)
POTASSIUM BLD-SCNC: 3.9 MMOL/L (ref 3.4–5.3)
PR INTERVAL - MUSE: 140 MS
PROT SERPL-MCNC: 7.6 G/DL (ref 6.8–8.8)
QRS DURATION - MUSE: 90 MS
QT - MUSE: 370 MS
QTC - MUSE: 390 MS
R AXIS - MUSE: -6 DEGREES
RBC # BLD AUTO: 4.67 10E6/UL (ref 3.8–5.2)
SODIUM SERPL-SCNC: 139 MMOL/L (ref 133–144)
SYSTOLIC BLOOD PRESSURE - MUSE: NORMAL MMHG
T AXIS - MUSE: 11 DEGREES
TROPONIN I SERPL HS-MCNC: <3 NG/L
VENTRICULAR RATE- MUSE: 67 BPM
WBC # BLD AUTO: 10.2 10E3/UL (ref 4–11)

## 2022-05-03 PROCEDURE — 84484 ASSAY OF TROPONIN QUANT: CPT | Performed by: EMERGENCY MEDICINE

## 2022-05-03 PROCEDURE — 70450 CT HEAD/BRAIN W/O DYE: CPT

## 2022-05-03 PROCEDURE — 80053 COMPREHEN METABOLIC PANEL: CPT | Performed by: EMERGENCY MEDICINE

## 2022-05-03 PROCEDURE — 36415 COLL VENOUS BLD VENIPUNCTURE: CPT | Performed by: EMERGENCY MEDICINE

## 2022-05-03 PROCEDURE — 93005 ELECTROCARDIOGRAM TRACING: CPT

## 2022-05-03 PROCEDURE — 99285 EMERGENCY DEPT VISIT HI MDM: CPT | Mod: 25

## 2022-05-03 PROCEDURE — 250N000013 HC RX MED GY IP 250 OP 250 PS 637: Performed by: EMERGENCY MEDICINE

## 2022-05-03 PROCEDURE — 85025 COMPLETE CBC W/AUTO DIFF WBC: CPT | Performed by: EMERGENCY MEDICINE

## 2022-05-03 RX ORDER — MECLIZINE HCL 25MG 25 MG/1
25 TABLET, CHEWABLE ORAL EVERY 6 HOURS PRN
Qty: 30 TABLET | Refills: 0 | Status: SHIPPED | OUTPATIENT
Start: 2022-05-03 | End: 2022-05-24

## 2022-05-03 RX ORDER — MECLIZINE HYDROCHLORIDE 25 MG/1
25 TABLET ORAL ONCE
Status: COMPLETED | OUTPATIENT
Start: 2022-05-03 | End: 2022-05-03

## 2022-05-03 RX ADMIN — MECLIZINE HYDROCHLORIDE 25 MG: 25 TABLET ORAL at 20:49

## 2022-05-03 NOTE — LETTER
May 3, 2022      To Whom It May Concern:      Teresa Herron was seen in our Emergency Department today, 05/03/22.  I expect her condition to improve over the next 3 days.  She may return to work/school when improved.    Sincerely,        Juan Pablo Rouse RN

## 2022-05-03 NOTE — ED TRIAGE NOTES
Pt presents with dizziness and nausea that started this morning.      Triage Assessment     Row Name 05/03/22 1301       Triage Assessment (Adult)    Airway WDL WDL       Respiratory WDL    Respiratory WDL WDL       Skin Circulation/Temperature WDL    Skin Circulation/Temperature WDL WDL       Cardiac WDL    Cardiac WDL WDL       Peripheral/Neurovascular WDL    Peripheral Neurovascular WDL WDL       Cognitive/Neuro/Behavioral WDL    Cognitive/Neuro/Behavioral WDL WDL

## 2022-05-04 ENCOUNTER — PATIENT OUTREACH (OUTPATIENT)
Dept: CARE COORDINATION | Facility: CLINIC | Age: 67
End: 2022-05-04
Payer: COMMERCIAL

## 2022-05-04 ENCOUNTER — TRANSFERRED RECORDS (OUTPATIENT)
Dept: HEALTH INFORMATION MANAGEMENT | Facility: CLINIC | Age: 67
End: 2022-05-04
Payer: COMMERCIAL

## 2022-05-04 DIAGNOSIS — Z71.89 OTHER SPECIFIED COUNSELING: ICD-10-CM

## 2022-05-04 NOTE — ED PROVIDER NOTES
History     Chief Complaint:    Dizziness and Nausea & Vomiting      HPI   Teresa Herron is a 66 year old female who presents with dizziness and vomiting.  She has a history of hypertension and has been off of her medications for about month 1 month and had her meds refilled today.  She notes today she woke up in a hurry and developed generalized headache as well as dizziness.  She been having difficulty with walking as well as nausea and vomiting.  She denies any neck pain and denies any new chiropractic work to her neck.  She does not typically get headaches.  She notes the headache is generalized both in the front and in the back and the back radiates into the neck.  She has not had this problem before.  She does note some ringing in the ears and difficulty when showering with the right ear.  She denies diplopia dysarthria or dysphagia.  She has difficulty with walking.  She has no history of stroke.  She does take an aspirin but has not taken one for some time either.    Review of Systems  + Headache nausea vomiting dizziness difficulty with walking  Negative for diplopia dysarthria or dysphagia no fevers or chills all other systems negative    Allergies:      No Known Allergies      Medications:      meclizine 25 MG CHEW  aspirin 81 MG EC tablet  cholecalciferol (VITAMIN D3) 25 mcg (1000 units) capsule  famotidine (PEPCID) 10 MG tablet  hydrochlorothiazide (HYDRODIURIL) 12.5 MG tablet  irbesartan (AVAPRO) 300 MG tablet  metoprolol succinate ER (TOPROL-XL) 25 MG 24 hr tablet  nitroGLYcerin (NITROSTAT) 0.4 MG sublingual tablet  omega 3 1000 MG CAPS  simvastatin (ZOCOR) 40 MG tablet        Past Medical History:        Past Medical History:   Diagnosis Date     Arthritis of knee, degenerative      ASCVD (arteriosclerotic cardiovascular disease) 06/2021     Chest pain 04/2015     Dizziness years     GERD (gastroesophageal reflux disease)      H/O colonoscopy 2014     HTN (hypertension) 1988      Hypercholesteraemia 2010     Screening for osteoporosis 10/2020     Type II or unspecified type diabetes mellitus without mention of complication, not stated as uncontrolled      Vitamin D deficiency      Patient Active Problem List    Diagnosis Date Noted     Status post coronary angiogram 06/17/2021     Priority: Medium     Abnormal cardiovascular stress test 06/08/2021     Priority: Medium     Added automatically from request for surgery [id number]       ASCVD (arteriosclerotic cardiovascular disease) 06/2021     Priority: Medium     on angio after pos est for collazo.  Non obstructive cad, med mgmt       Arthritis of knee, degenerative      Priority: Medium     seen by Dr. Jones 2017 and cortisone did not help       Type 2 diabetes mellitus without complication, without long-term current use of insulin (H) 11/28/2016     Priority: Medium     Gastroesophageal reflux disease without esophagitis 11/28/2016     Priority: Medium     Non morbid obesity, unspecified obesity type 11/28/2016     Priority: Medium     Hyperlipidemia LDL goal <130 07/11/2014     Priority: Medium     Benign essential hypertension      Priority: Medium     Vitamin D deficiency      Priority: Medium        Past Surgical History:        Past Surgical History:   Procedure Laterality Date     COLONOSCOPY N/A 11/18/2014    Procedure: COLONOSCOPY;  Surgeon: oYni Carvajal MD;  Location:  GI     CV HEART CATHETERIZATION WITH POSSIBLE INTERVENTION N/A 6/17/2021    Procedure: Coronary Angiogram;  Surgeon: Renata Herring MD;  Location:  HEART CARDIAC CATH LAB     CV LEFT HEART CATH N/A 6/17/2021    Procedure: Left Heart Cath;  Surgeon: Renata Herring MD;  Location:  HEART CARDIAC CATH LAB     fibroid surgery  2000     HYSTERECTOMY, PAP NO LONGER INDICATED  2006    estrada/bso due to pain and bleeding     LAPAROSCOPIC APPENDECTOMY  5/19/2014    Procedure: LAPAROSCOPIC APPENDECTOMY;  Surgeon: Will Barth MD;  Location: [facility]  "History:        Family History   Problem Relation Age of Onset     Diabetes Mother      Hypertension Mother      Other - See Comments Sister         4 sisters and 3 brothers, dm, kidney dz       Social History:  Lives with her  denies smoking or drugs    Physical Exam     Patient Vitals for the past 24 hrs:   BP Temp Temp src Pulse Resp SpO2 Height Weight   05/03/22 2246 (!) 156/80 -- -- 70 18 98 % -- --   05/03/22 1302 (!) 171/94 97.5  F (36.4  C) Temporal 67 18 97 % 1.422 m (4' 8\") 66.7 kg (147 lb)       Physical Exam  GENERAL: well developed, pleasant  HEAD: atraumatic  EYES: pupils reactive, extraocular muscles intact, conjunctivae normal  ENT:  mucus membranes moist, cerumen impaction to the right ear that is dry and crusted left is clear  NECK:  trachea midline, normal range of motion  RESPIRATORY: no tachypnea, breath sounds clear to auscultation   CVS: normal S1/S2, no murmurs, intact distal pulses  ABDOMEN: soft, nontender, nondistention  MUSCULOSKELETAL: no deformities  SKIN: warm and dry, no acute rashes or ulceration  NEURO: GCS 15, cranial nerves intact, alert and oriented x3, Epley remover maneuver reproduces symptoms to the left no significant horizontal nystagmus some question of subtle vertical nystagmus, normal finger-to-nose, normal heel-to-shin  PSYCH:  Mood/affect normal        Emergency Department Course       Imaging:  CT Head w/o Contrast   Final Result   IMPRESSION:   1.  No discrete mass lesion, hemorrhage or focal area suggestive of acute infarct.   2.  Minimal age related changes.        Report per radiology    Laboratory:  Labs Ordered and Resulted from Time of ED Arrival to Time of ED Departure   COMPREHENSIVE METABOLIC PANEL - Abnormal       Result Value    Sodium 139      Potassium 3.9      Chloride 108      Carbon Dioxide (CO2) 26      Anion Gap 5      Urea Nitrogen 12      Creatinine 0.61      Calcium 9.5      Glucose 124 (*)     Alkaline Phosphatase 88      AST 14      ALT " 19      Protein Total 7.6      Albumin 3.5      Bilirubin Total 0.6      GFR Estimate >90     CBC WITH PLATELETS AND DIFFERENTIAL - Abnormal    WBC Count 10.2      RBC Count 4.67      Hemoglobin 12.9      Hematocrit 42.4      MCV 91      MCH 27.6      MCHC 30.4 (*)     RDW 14.1      Platelet Count 301      % Neutrophils 64      % Lymphocytes 31      % Monocytes 4      % Eosinophils 1      % Basophils 0      % Immature Granulocytes 0      NRBCs per 100 WBC 0      Absolute Neutrophils 6.5      Absolute Lymphocytes 3.2      Absolute Monocytes 0.4      Absolute Eosinophils 0.1      Absolute Basophils 0.0      Absolute Immature Granulocytes 0.0      Absolute NRBCs 0.0     TROPONIN I - Normal    Troponin I High Sensitivity <3         Procedures:  na    Emergency Department Course:             Reviewed:    I reviewed nursing notes, vitals and past medical history    Assessments:   I obtained history and examined the patient as noted above.    I rechecked the patient and explained findings.       Consults:   na         Interventions:    Medications   meclizine (ANTIVERT) tablet 25 mg (25 mg Oral Given 5/3/22 2049)       Disposition:  The patient was discharged to home.     Impression & Plan            CMS Diagnoses:        Medical Decision Making:  Patient presents with sudden onset vertigo in the setting of waking up quickly.  She has reproducible symptoms when looking to the left with Epley maneuver.  Does have a headache but no severe neck pain to suggest dissection.  Also hypertensive but has been off of her meds and recently got them restarted and pick them up from the pharmacy today.  CT head is negative for acute pathology.  She had her right ear irrigated by the tech with large wax removal.  She was given meclizine and fluids and feeling much improved without ongoing symptoms.  Do not suspect stroke that she requires an MRI or MRA.  Discussed outpatient management and indications for return.  She will restart her  medications for blood pressure.  Do not see any endorgan disease problems in terms of hypertensive urgency.    Critical Care time:  none    Covid-19  Teresa Herron was evaluated during a global COVID-19 pandemic, which necessitated consideration that the patient might be at risk for infection with the SARS-CoV-2 virus that causes COVID-19.   Applicable protocols for evaluation were followed during the patient's care.   COVID-19 was considered as part of the patient's evaluation.    Diagnosis:    ICD-10-CM    1. Benign paroxysmal positional vertigo, left  H81.12    2. Impacted cerumen of right ear  H61.21    3. Hypertension, unspecified type  I10        Discharge Medications:  Discharge Medication List as of 5/3/2022 10:43 PM      START taking these medications    Details   meclizine 25 MG CHEW Take 1 tablet (25 mg) by mouth every 6 hours as needed for dizziness, Disp-30 tablet, R-0, Local Print               Scribe Disclosure:  Silvino BAUER MD, am serving as a scribe at 7:43 PM on 5/3/2022 to document services personally performed by Silvino Rendon MD based on my observations and the provider's statements to me.      Silvino Rendon MD  05/04/22 0150

## 2022-05-04 NOTE — PROGRESS NOTES
Clinic Care Coordination Contact  [facility]: Post-Discharge Note  SITUATION                                                      Admission:    Admission Date: 05/03/22   Reason for Admission: Dizziness, Nausea & Vomiting  Discharge:   Discharge Date: 05/03/22  Discharge Diagnosis: Benign paroxysmal positional vertigo, left    BACKGROUND                                                      Per hospital discharge summary and inpatient provider notes:  Teresa Herron is a 66 year old female who presents with dizziness and vomiting.  She has a history of hypertension and has been off of her medications for about month 1 month and had her meds refilled today.  She notes today she woke up in a hurry and developed generalized headache as well as dizziness.  She been having difficulty with walking as well as nausea and vomiting.  She denies any neck pain and denies any new chiropractic work to her neck.  She does not typically get headaches.  She notes the headache is generalized both in the front and in the back and the back radiates into the neck.  She has not had this problem before.  She does note some ringing in the ears and difficulty when showering with the right ear.  She denies diplopia dysarthria or dysphagia.  She has difficulty with walking.  She has no history of stroke.  She does take an aspirin but has not taken one for some time either.    ASSESSMENT      Enrollment  Primary Care Care Coordination Status: Declined    Discharge Assessment  How are you doing now that you are home?: a bit lightheaded  How are your symptoms? (Red Flag symptoms escalate to triage hotline per guidelines): Improved  Do you feel your condition is stable enough to be safe at home until your provider visit?: Yes  Does the patient have their discharge instructions? : Yes  Does the patient have questions regarding their discharge instructions? : No  Were you started on any new medications or were there changes to any of your  previous medications? : Yes  Does the patient have all of their medications?: No (see comment) (will  later)  Do you have questions regarding any of your medications? : No  Do you have all of your needed medical supplies or equipment (DME)?  (i.e. oxygen tank, CPAP, cane, etc.): Yes  Discharge follow-up appointment scheduled within 14 calendar days? : Yes         PLAN                                                      Outpatient Plan:  Follow-Ups: Follow up with Yoni Baxter MD (Internal Medicine)        Future Appointments   Date Time Provider Department [place]   5/24/2022 10:30 AM Yoni Baxter MD [facility]          For any urgent concerns, please contact our 24 hour nurse triage line: [phone] ([phone])         Sarahi Vo MA

## 2022-05-17 ENCOUNTER — TRANSFERRED RECORDS (OUTPATIENT)
Dept: HEALTH INFORMATION MANAGEMENT | Facility: CLINIC | Age: 67
End: 2022-05-17

## 2022-05-24 ENCOUNTER — OFFICE VISIT (OUTPATIENT)
Dept: FAMILY MEDICINE | Facility: CLINIC | Age: 67
End: 2022-05-24
Payer: COMMERCIAL

## 2022-05-24 VITALS
SYSTOLIC BLOOD PRESSURE: 128 MMHG | TEMPERATURE: 97.7 F | OXYGEN SATURATION: 99 % | HEIGHT: 56 IN | DIASTOLIC BLOOD PRESSURE: 86 MMHG | RESPIRATION RATE: 16 BRPM | HEART RATE: 72 BPM | WEIGHT: 147 LBS | BODY MASS INDEX: 33.07 KG/M2

## 2022-05-24 DIAGNOSIS — I25.10 ASCVD (ARTERIOSCLEROTIC CARDIOVASCULAR DISEASE): ICD-10-CM

## 2022-05-24 DIAGNOSIS — Z23 NEED FOR VACCINATION: Primary | ICD-10-CM

## 2022-05-24 DIAGNOSIS — E55.9 VITAMIN D DEFICIENCY: ICD-10-CM

## 2022-05-24 DIAGNOSIS — K21.9 GASTROESOPHAGEAL REFLUX DISEASE WITHOUT ESOPHAGITIS: ICD-10-CM

## 2022-05-24 DIAGNOSIS — I10 BENIGN ESSENTIAL HYPERTENSION: ICD-10-CM

## 2022-05-24 DIAGNOSIS — E66.9 NON MORBID OBESITY, UNSPECIFIED OBESITY TYPE: ICD-10-CM

## 2022-05-24 DIAGNOSIS — E78.5 HYPERLIPIDEMIA LDL GOAL <130: ICD-10-CM

## 2022-05-24 DIAGNOSIS — E11.9 TYPE 2 DIABETES MELLITUS WITHOUT COMPLICATION, WITHOUT LONG-TERM CURRENT USE OF INSULIN (H): ICD-10-CM

## 2022-05-24 DIAGNOSIS — Z00.00 ENCOUNTER FOR ANNUAL PHYSICAL EXAM: ICD-10-CM

## 2022-05-24 PROBLEM — R94.39 ABNORMAL CARDIOVASCULAR STRESS TEST: Status: RESOLVED | Noted: 2021-06-08 | Resolved: 2022-05-24

## 2022-05-24 LAB — HBA1C MFR BLD: 6.5 % (ref 0–5.6)

## 2022-05-24 PROCEDURE — 90471 IMMUNIZATION ADMIN: CPT | Performed by: INTERNAL MEDICINE

## 2022-05-24 PROCEDURE — 80061 LIPID PANEL: CPT | Performed by: INTERNAL MEDICINE

## 2022-05-24 PROCEDURE — 99396 PREV VISIT EST AGE 40-64: CPT | Mod: 25 | Performed by: INTERNAL MEDICINE

## 2022-05-24 PROCEDURE — 82306 VITAMIN D 25 HYDROXY: CPT | Performed by: INTERNAL MEDICINE

## 2022-05-24 PROCEDURE — 83036 HEMOGLOBIN GLYCOSYLATED A1C: CPT | Performed by: INTERNAL MEDICINE

## 2022-05-24 PROCEDURE — 90714 TD VACC NO PRESV 7 YRS+ IM: CPT | Performed by: INTERNAL MEDICINE

## 2022-05-24 PROCEDURE — 36415 COLL VENOUS BLD VENIPUNCTURE: CPT | Performed by: INTERNAL MEDICINE

## 2022-05-24 ASSESSMENT — PAIN SCALES - GENERAL: PAINLEVEL: NO PAIN (0)

## 2022-05-24 NOTE — PROGRESS NOTES
SUBJECTIVE:   CC: Teresa Herron is an 66 year old woman who presents for preventive health visit.     The patient is doiing well, not working out as no time to, baby sits at night.  She has niddm not on meds, up to date eye exam, no sores on feet or toes.  Had recent vertigo, I reviewed that and since has been fine.  Was out of meds then, on them now.  No other c/o.            Today's PHQ-2 Score:   PHQ-2 ( 1999 [employer]) 5/24/2022   Q1: Little interest or pleasure in doing things 0   Q2: Feeling down, depressed or hopeless 0   PHQ-2 Score 0   PHQ-2 Total Score (12-17 Years)- Positive if 3 or more points; Administer PHQ-A if positive -       Abuse: Current or Past (Physical, Sexual or Emotional) -   Do you feel safe in your environment?                Past Medical History:      Past Medical History:   Diagnosis Date     Arthritis of knee, degenerative     seen by Dr. Jones 2017 and cortisone did not help     ASCVD (arteriosclerotic cardiovascular disease) 06/2021    on angio after pos est for collazo.  Non obstructive cad, med mgmt     Chest pain 04/2015    nl est echo     Dizziness years     GERD (gastroesophageal reflux disease)      H/O colonoscopy 2014    tics, tortuous, fu 5 years     HTN (hypertension) 1988     Hypercholesteraemia 2010     Screening for osteoporosis 10/2020    nl dexa     Type II or unspecified type diabetes mellitus without mention of complication, not stated as uncontrolled     not on meds as of 2018     Vitamin D deficiency              Past Surgical History:      Past Surgical History:   Procedure Laterality Date     COLONOSCOPY N/A 11/18/2014    Procedure: COLONOSCOPY;  Surgeon: Yoni Carvajal MD;  Location:  GI     CV HEART CATHETERIZATION WITH POSSIBLE INTERVENTION N/A 6/17/2021    Procedure: Coronary Angiogram;  Surgeon: Renata Herring MD;  Location:  HEART CARDIAC CATH LAB     CV LEFT HEART CATH N/A 6/17/2021    Procedure: Left Heart Cath;  Surgeon: Renata Herring MD;   Location:  HEART CARDIAC CATH LAB     fibroid surgery  2000     HYSTERECTOMY, PAP NO LONGER INDICATED  2006    estrada/bso due to pain and bleeding     LAPAROSCOPIC APPENDECTOMY  5/19/2014    Procedure: LAPAROSCOPIC APPENDECTOMY;  Surgeon: Will Barth MD;  Location: [facility]             Social History:     Social History     Socioeconomic History     Marital status:      Spouse name: Not on file     Number of children: 1     Years of education: Not on file     Highest education level: Not on file   Occupational History     Occupation: work dietary, in kitchen     Employer: [employer]   Tobacco Use     Smoking status: Never Smoker     Smokeless tobacco: Never Used   Substance and Sexual Activity     Alcohol use: No     Alcohol/week: 0.0 standard drinks     Drug use: No     Sexual activity: Not Currently   Other Topics Concern     Not on file   Social History Narrative     Not on file     Social Determinants of Health     Financial Resource Strain: Not on file   Food Insecurity: Not on file   Transportation Needs: Not on file   Physical Activity: Not on file   Stress: Not on file   Social Connections: Not on file   Intimate Partner Violence: Not on file   Housing Stability: Not on file             Family History:   reviewed         Allergies:   No Known Allergies          Medications:     Current Outpatient Medications   Medication Sig Dispense Refill     aspirin 81 MG EC tablet Take 81 mg by mouth as needed for moderate pain       cholecalciferol (VITAMIN D3) 25 mcg (1000 units) capsule Take 1 capsule by mouth daily       famotidine (PEPCID) 10 MG tablet Take 10 mg by mouth as needed       hydrochlorothiazide (HYDRODIURIL) 12.5 MG tablet Take 1 tablet (12.5 mg) by mouth daily 90 tablet 3     irbesartan (AVAPRO) 300 MG tablet Take 1 tablet (300 mg) by mouth At Bedtime 90 tablet 3     metoprolol succinate ER (TOPROL-XL) 25 MG 24 hr tablet Take 2 tablets (50 mg) by mouth daily 90 tablet 3  "    nitroGLYcerin (NITROSTAT) 0.4 MG sublingual tablet For chest pain place 1 tablet under the tongue every 5 minutes for 3 doses. If symptoms persist 5 minutes after 1st dose call 911. 30 tablet 11     omega 3 1000 MG CAPS Take 1 g by mouth daily 90 capsule      simvastatin (ZOCOR) 40 MG tablet Take 1 tablet (40 mg) by mouth At Bedtime 90 tablet 3               Review of Systems:   The 10 point Review of Systems is negative other than noted in the HPI           Physical Exam:   Blood pressure 128/86, pulse 72, temperature 97.7  F (36.5  C), temperature source Temporal, resp. rate 16, height 1.422 m (4' 8\"), weight 66.7 kg (147 lb), SpO2 99 %, not currently breastfeeding.    Exam:  Constitutional: healthy appearing, alert and in no distress  Heent: Normocephalic. Head without obvious masses or lesions. PERRLDC, EOMI. Mouth exam within normal limits: tongue, mucous membranes, posterior pharynx all normal, no lesions or abnormalities seen.  Tm's and canals within normal limits bilaterally. Neck supple, no nuchal rigidity or masses. No supraclavicular, or cervical adenopathy. Thyroid symmetric, no masses.  Cardiovascular: Regular rate and rhythm, no murmer, rub or gallops.  JVP not elevated, no edema.  Carotids within normal limits bilaterally, no bruits.  Respiratory: Normal respiratory effort.  Lungs clear, normal flow, no wheezing or crackles.  Breasts: Normal bilaterally.  No masses or lesions.  Nipples within normal limits.  No axillary lesions or nodes.  My M.A. Was present during this part of the examination.  Gastrointestinal: Normal active bowel sounds.   Soft, not tender, no masses, guarding or rebound.  No hepatosplenomegaly.   Musculoskeletal: extremities normal, no gross deformities noted.  Skin: no suspicious lesions or rashes   Neurologic: Mental status within normal limits.  Speech fluent.  No gross motor abnormalities and gait intact.  Psychiatric: mentation appears normal and affect normal.  Feet " within normal limits bilat, no sores         Data:   Labs sent        Assessment:   1. Normal complete physical exam  2. Niddm, not on meds, follow up labs, feet fine  3. Cad, med mgmt  4. Gerd, no c/o  5. Vit d defic, follow up labs  6. Elevated cholesterol on statin  7. Hypertension, controlled  8. Obesity, weight loss  9. hcm         Plan:   covid booster at pharm  Td shot  Letter with labs ,some already done  Exercise, diet, weight loss  Routine colon  Up to date mammogram      Yoni Baxter M.D.

## 2022-05-25 LAB
CHOLEST SERPL-MCNC: 201 MG/DL
DEPRECATED CALCIDIOL+CALCIFEROL SERPL-MC: 38 UG/L (ref 20–75)
FASTING STATUS PATIENT QL REPORTED: YES
HDLC SERPL-MCNC: 64 MG/DL
LDLC SERPL CALC-MCNC: 116 MG/DL
NONHDLC SERPL-MCNC: 137 MG/DL
TRIGL SERPL-MCNC: 104 MG/DL

## 2022-05-25 RX ORDER — ATORVASTATIN CALCIUM 40 MG/1
40 TABLET, FILM COATED ORAL DAILY
Qty: 90 TABLET | Refills: 3 | Status: SHIPPED | OUTPATIENT
Start: 2022-05-25 | End: 2023-03-03

## 2022-05-25 RX ORDER — ROSUVASTATIN CALCIUM 40 MG/1
40 TABLET, COATED ORAL DAILY
Qty: 90 TABLET | Refills: 3 | Status: SHIPPED | OUTPATIENT
Start: 2022-05-25 | End: 2023-03-03

## 2022-05-26 ENCOUNTER — IMMUNIZATION (OUTPATIENT)
Dept: NURSING | Facility: CLINIC | Age: 67
End: 2022-05-26
Payer: COMMERCIAL

## 2022-05-26 PROCEDURE — 91305 COVID-19,PF,PFIZER (12+ YRS): CPT

## 2022-05-26 PROCEDURE — 0054A COVID-19,PF,PFIZER (12+ YRS): CPT

## 2022-05-26 NOTE — RESULT ENCOUNTER NOTE
It was very nice to see you.  Your labs should be viewable to you.    The diabetes test or hemoglobin a1c is slightly higher this time.  I know it is hard, but please try to get your weight down for this.      Your vitamin D level is normal.    Your cholesterol is too high putting you at higher risk of vascular events such as heart attacks or strokes.  I would like to change the cholesterol pill to one that may be more effective.  I would prefer crestor but if that is too costly would then use lipitor.  I will send both in to your pharmacy and you can see about the costs and decide.      If you have any questions please call me.    Yoni Baxter M.D.

## 2022-06-09 DIAGNOSIS — R94.39 ABNORMAL CARDIOVASCULAR STRESS TEST: ICD-10-CM

## 2022-06-09 RX ORDER — METOPROLOL SUCCINATE 25 MG/1
TABLET, EXTENDED RELEASE ORAL
Qty: 180 TABLET | Refills: 2 | Status: SHIPPED | OUTPATIENT
Start: 2022-06-09 | End: 2023-03-03

## 2022-06-09 NOTE — TELEPHONE ENCOUNTER
Prescription approved per [facility] Refill Protocol.  Meaghan Rosen RN  [facility]

## 2022-07-20 ENCOUNTER — TRANSFERRED RECORDS (OUTPATIENT)
Dept: HEALTH INFORMATION MANAGEMENT | Facility: CLINIC | Age: 67
End: 2022-07-20

## 2022-09-10 ENCOUNTER — HEALTH MAINTENANCE LETTER (OUTPATIENT)
Age: 67
End: 2022-09-10

## 2022-11-22 ENCOUNTER — TELEPHONE (OUTPATIENT)
Dept: FAMILY MEDICINE | Facility: CLINIC | Age: 67
End: 2022-11-22

## 2022-12-06 ENCOUNTER — TELEPHONE (OUTPATIENT)
Dept: FAMILY MEDICINE | Facility: CLINIC | Age: 67
End: 2022-12-06

## 2022-12-06 NOTE — TELEPHONE ENCOUNTER
Reason for Call:  Other appointment    Detailed comments: Patient would like to see Dr Baxter for Physical. Patient does not want to wait till May(which is what came up for next appointment). Patient has new insurance so does not have to wait a year since her last one which was 5/24/22    Phone Number Patient can be reached at: Cell number on file:    Telephone Information:   Mobile [phone]       Best Time: anytime    Can we leave a detailed message on this number? YES    Call taken on 12/6/2022 at 1:11 PM by Beth Ng

## 2022-12-14 ENCOUNTER — TRANSFERRED RECORDS (OUTPATIENT)
Dept: HEALTH INFORMATION MANAGEMENT | Facility: CLINIC | Age: 67
End: 2022-12-14

## 2022-12-21 NOTE — TELEPHONE ENCOUNTER
Pt placed on waitlist. [employer] will reach out to Pt when an available heber't opens up before 05.01.23.

## 2023-01-22 ENCOUNTER — HEALTH MAINTENANCE LETTER (OUTPATIENT)
Age: 68
End: 2023-01-22

## 2023-02-21 ENCOUNTER — TELEPHONE (OUTPATIENT)
Dept: FAMILY MEDICINE | Facility: CLINIC | Age: 68
End: 2023-02-21

## 2023-02-21 NOTE — TELEPHONE ENCOUNTER
Reason for Call:  Other appointment    Detailed comments: Looking to get in with only PCP for an appt sooner than the one scheduled- patient states she is not feeling well no other information provided     Phone Number Patient can be reached at: Cell number on file:    Telephone Information:   Mobile [phone]       Best Time: anytime    Can we leave a detailed message on this number? YES    Call taken on 2/21/2023 at 1:01 PM by Zoya Hurt       Island Pedicle Flap Text: The defect edges were debeveled with a #15 scalpel blade.  Given the location of the defect, shape of the defect and the proximity to free margins an island pedicle advancement flap was deemed most appropriate.  Using a sterile surgical marker, an appropriate advancement flap was drawn incorporating the defect, outlining the appropriate donor tissue and placing the expected incisions within the relaxed skin tension lines where possible.    The area thus outlined was incised deep to adipose tissue with a #15 scalpel blade.  The skin margins were undermined to an appropriate distance in all directions around the primary defect and laterally outward around the island pedicle utilizing iris scissors.  There was minimal undermining beneath the pedicle flap.

## 2023-02-22 ENCOUNTER — TELEPHONE (OUTPATIENT)
Dept: FAMILY MEDICINE | Facility: CLINIC | Age: 68
End: 2023-02-22

## 2023-02-22 NOTE — TELEPHONE ENCOUNTER
Patient Returning Call    Reason for call:  PLS CALL PT BACK IF PCP CAN FIT HER INTO SCHEDULE NEXT WEEK DUE TO BAD WEATHER FOR TOMORROW SHE DID HAVE TO CANCEL APPT THEY FIT HER INTO TY     Information relayed to patient: WOULD SEND HIGH PRO MSG TO CARE TEAM      Patient has additional questions:  No    What are your questions/concerns:  NOT FEELING WELL WOULD LIKE TO BE SEEN BY PCP ONLY     Could we send this information to you in [facility] or would you prefer to receive a phone call?:   Patient would prefer a phone call   Okay to leave a detailed message?: Yes at Home number on file [phone] (home)

## 2023-02-24 NOTE — TELEPHONE ENCOUNTER
Spoke with pt and offered appointment today 02/24/2023. Pt state that she is working and not able to come today. I scheduled an appointment on 03/03/2023 with .    Enedelia Lopez CMA

## 2023-03-01 ENCOUNTER — TRANSFERRED RECORDS (OUTPATIENT)
Dept: HEALTH INFORMATION MANAGEMENT | Facility: CLINIC | Age: 68
End: 2023-03-01
Payer: COMMERCIAL

## 2023-03-03 ENCOUNTER — OFFICE VISIT (OUTPATIENT)
Dept: FAMILY MEDICINE | Facility: CLINIC | Age: 68
End: 2023-03-03
Payer: COMMERCIAL

## 2023-03-03 VITALS
SYSTOLIC BLOOD PRESSURE: 121 MMHG | HEART RATE: 78 BPM | WEIGHT: 146 LBS | HEIGHT: 56 IN | OXYGEN SATURATION: 100 % | BODY MASS INDEX: 32.84 KG/M2 | DIASTOLIC BLOOD PRESSURE: 84 MMHG | TEMPERATURE: 97.3 F | RESPIRATION RATE: 16 BRPM

## 2023-03-03 DIAGNOSIS — R10.32 ABDOMINAL PAIN, LEFT LOWER QUADRANT: Primary | ICD-10-CM

## 2023-03-03 DIAGNOSIS — I10 BENIGN ESSENTIAL HYPERTENSION: ICD-10-CM

## 2023-03-03 DIAGNOSIS — E78.5 HYPERLIPIDEMIA LDL GOAL <130: ICD-10-CM

## 2023-03-03 DIAGNOSIS — I25.10 ASCVD (ARTERIOSCLEROTIC CARDIOVASCULAR DISEASE): ICD-10-CM

## 2023-03-03 DIAGNOSIS — E11.9 TYPE 2 DIABETES MELLITUS WITHOUT COMPLICATION, WITHOUT LONG-TERM CURRENT USE OF INSULIN (H): ICD-10-CM

## 2023-03-03 DIAGNOSIS — R94.39 ABNORMAL CARDIOVASCULAR STRESS TEST: ICD-10-CM

## 2023-03-03 LAB
ANION GAP SERPL CALCULATED.3IONS-SCNC: 11 MMOL/L (ref 7–15)
BUN SERPL-MCNC: 12.9 MG/DL (ref 8–23)
CALCIUM SERPL-MCNC: 9.1 MG/DL (ref 8.8–10.2)
CHLORIDE SERPL-SCNC: 103 MMOL/L (ref 98–107)
CHOLEST SERPL-MCNC: 157 MG/DL
CREAT SERPL-MCNC: 0.59 MG/DL (ref 0.51–0.95)
CREAT UR-MCNC: 88.8 MG/DL
DEPRECATED HCO3 PLAS-SCNC: 27 MMOL/L (ref 22–29)
ERYTHROCYTE [DISTWIDTH] IN BLOOD BY AUTOMATED COUNT: 13.3 % (ref 10–15)
GFR SERPL CREATININE-BSD FRML MDRD: >90 ML/MIN/1.73M2
GLUCOSE SERPL-MCNC: 145 MG/DL (ref 70–99)
HBA1C MFR BLD: 6.6 % (ref 0–5.6)
HCT VFR BLD AUTO: 42.3 % (ref 35–47)
HDLC SERPL-MCNC: 56 MG/DL
HGB BLD-MCNC: 12.7 G/DL (ref 11.7–15.7)
LDLC SERPL CALC-MCNC: 74 MG/DL
MCH RBC QN AUTO: 28.2 PG (ref 26.5–33)
MCHC RBC AUTO-ENTMCNC: 30 G/DL (ref 31.5–36.5)
MCV RBC AUTO: 94 FL (ref 78–100)
MICROALBUMIN UR-MCNC: <12 MG/L
MICROALBUMIN/CREAT UR: NORMAL MG/G{CREAT}
NONHDLC SERPL-MCNC: 101 MG/DL
PLATELET # BLD AUTO: 266 10E3/UL (ref 150–450)
POTASSIUM SERPL-SCNC: 4.9 MMOL/L (ref 3.4–5.3)
RBC # BLD AUTO: 4.51 10E6/UL (ref 3.8–5.2)
SODIUM SERPL-SCNC: 141 MMOL/L (ref 136–145)
TRIGL SERPL-MCNC: 137 MG/DL
WBC # BLD AUTO: 7.2 10E3/UL (ref 4–11)

## 2023-03-03 PROCEDURE — 99214 OFFICE O/P EST MOD 30 MIN: CPT | Performed by: INTERNAL MEDICINE

## 2023-03-03 PROCEDURE — 36415 COLL VENOUS BLD VENIPUNCTURE: CPT | Performed by: INTERNAL MEDICINE

## 2023-03-03 PROCEDURE — 80048 BASIC METABOLIC PNL TOTAL CA: CPT | Performed by: INTERNAL MEDICINE

## 2023-03-03 PROCEDURE — 80061 LIPID PANEL: CPT | Performed by: INTERNAL MEDICINE

## 2023-03-03 PROCEDURE — 82570 ASSAY OF URINE CREATININE: CPT | Performed by: INTERNAL MEDICINE

## 2023-03-03 PROCEDURE — 85027 COMPLETE CBC AUTOMATED: CPT | Performed by: INTERNAL MEDICINE

## 2023-03-03 PROCEDURE — 82043 UR ALBUMIN QUANTITATIVE: CPT | Performed by: INTERNAL MEDICINE

## 2023-03-03 PROCEDURE — 83036 HEMOGLOBIN GLYCOSYLATED A1C: CPT | Performed by: INTERNAL MEDICINE

## 2023-03-03 RX ORDER — METOPROLOL SUCCINATE 25 MG/1
50 TABLET, EXTENDED RELEASE ORAL DAILY
Qty: 180 TABLET | Refills: 2 | Status: SHIPPED | OUTPATIENT
Start: 2023-03-03 | End: 2024-03-12

## 2023-03-03 RX ORDER — IRBESARTAN 300 MG/1
300 TABLET ORAL AT BEDTIME
Qty: 90 TABLET | Refills: 3 | Status: SHIPPED | OUTPATIENT
Start: 2023-03-03 | End: 2024-03-12

## 2023-03-03 RX ORDER — HYDROCHLOROTHIAZIDE 12.5 MG/1
12.5 TABLET ORAL DAILY
Qty: 90 TABLET | Refills: 3 | Status: SHIPPED | OUTPATIENT
Start: 2023-03-03 | End: 2024-03-12

## 2023-03-03 RX ORDER — ROSUVASTATIN CALCIUM 40 MG/1
40 TABLET, COATED ORAL DAILY
Qty: 90 TABLET | Refills: 3 | Status: SHIPPED | OUTPATIENT
Start: 2023-03-03 | End: 2024-03-12

## 2023-03-03 ASSESSMENT — PAIN SCALES - GENERAL: PAINLEVEL: NO PAIN (0)

## 2023-03-03 NOTE — PATIENT INSTRUCTIONS
Get your mammogram today at the [facility] on the 2nd floor.    Get the ct scan.    I will send you the lab results.    See me in 6 months    Yoni Baxter M.D.

## 2023-03-03 NOTE — RESULT ENCOUNTER NOTE
It was nice to see you today.  You should be able to view all of your test results.    Your CBC or blood count is normal with no signs of anemia or blood disorders.  Your chemistry panel shows normal blood salts and kidney tests.  Your sugar is well controlled with an A1c of 6.6.    Your cholesterol is excellent at 157.  Your HDL or good cholesterol and LDL or bad cholesterol are also good.  Lastly, your urine is clear.    Please let me know if you have questions.    Yoni Baxter M.D.

## 2023-03-03 NOTE — PROGRESS NOTES
This is a very pleasant 67-year-old diabetic who presents for evaluation of a few issues.    For the last 3 weeks or so she has had discomfort in the left groin radiating around to the back.  She notes that when she is sitting or lying but it hurts worse when she stands up and when she walks.  It does not really radiate otherwise.  She has joint pains elsewhere and saw orthopedics and they apparently x-rayed her hip and she is told she has arthritis there.  No nausea or vomiting.  No bowel changes.  No  symptoms.  No fevers or night sweats.    The patient also has diabetes for which she is not on medications.  I will get labs today.    Patient has hyperlipidemia and takes medication for that.  Her blood pressure is controlled on blood pressure medications.  She has coronary artery disease which is being managed medically.    She is due for a mammogram.  She did rather not get immunizations today.    Past Medical History:   Diagnosis Date     Arthritis of knee, degenerative     seen by Dr. Jones 2017 and cortisone did not help     ASCVD (arteriosclerotic cardiovascular disease) 06/2021    on angio after pos est for collazo.  Non obstructive cad, med mgmt     Chest pain 04/2015    nl est echo     Dizziness years     GERD (gastroesophageal reflux disease)      H/O colonoscopy 2014    tics, tortuous, fu 5 years     HTN (hypertension) 1988     Hypercholesteraemia 2010     Screening for osteoporosis 10/2020    nl dexa     Type II or unspecified type diabetes mellitus without mention of complication, not stated as uncontrolled     not on meds as of 2018     Vitamin D deficiency      Past Surgical History:   Procedure Laterality Date     COLONOSCOPY N/A 11/18/2014    Procedure: COLONOSCOPY;  Surgeon: Yoni Carvajal MD;  Location:  GI     CV HEART CATHETERIZATION WITH POSSIBLE INTERVENTION N/A 6/17/2021    Procedure: Coronary Angiogram;  Surgeon: Renata Hrering MD;  Location:  HEART CARDIAC CATH LAB     CV LEFT HEART  CATH N/A 6/17/2021    Procedure: Left Heart Cath;  Surgeon: Renata Herring MD;  Location:  HEART CARDIAC CATH LAB     fibroid surgery  2000     HYSTERECTOMY, PAP NO LONGER INDICATED  2006    estrada/bso due to pain and bleeding     LAPAROSCOPIC APPENDECTOMY  5/19/2014    Procedure: LAPAROSCOPIC APPENDECTOMY;  Surgeon: Will Barth MD;  Location: [facility]     Social History     Socioeconomic History     Marital status:      Spouse name: Not on file     Number of children: 1     Years of education: Not on file     Highest education level: Not on file   Occupational History     Occupation: work dietary, in kitchen     Employer: [employer]   Tobacco Use     Smoking status: Never     Smokeless tobacco: Never   Substance and Sexual Activity     Alcohol use: No     Alcohol/week: 0.0 standard drinks     Drug use: No     Sexual activity: Not Currently   Other Topics Concern     Not on file   Social History Narrative     Not on file     Social Determinants of Health     Financial Resource Strain: Not on file   Food Insecurity: Not on file   Transportation Needs: Not on file   Physical Activity: Not on file   Stress: Not on file   Social Connections: Not on file   Intimate Partner Violence: Not on file   Housing Stability: Not on file     Current Outpatient Medications   Medication Sig Dispense Refill     aspirin 81 MG EC tablet Take 81 mg by mouth as needed for moderate pain       cholecalciferol (VITAMIN D3) 25 mcg (1000 units) capsule Take 1 capsule by mouth daily       hydrochlorothiazide (HYDRODIURIL) 12.5 MG tablet Take 1 tablet (12.5 mg) by mouth daily 90 tablet 3     irbesartan (AVAPRO) 300 MG tablet Take 1 tablet (300 mg) by mouth At Bedtime 90 tablet 3     metoprolol succinate ER (TOPROL XL) 25 MG 24 hr tablet Take 2 tablets (50 mg) by mouth daily 180 tablet 2     nitroGLYcerin (NITROSTAT) 0.4 MG sublingual tablet For chest pain place 1 tablet under the tongue every 5 minutes for 3  "doses. If symptoms persist 5 minutes after 1st dose call 911. 30 tablet 11     omega 3 1000 MG CAPS Take 1 g by mouth daily 90 capsule      rosuvastatin (CRESTOR) 40 MG tablet Take 1 tablet (40 mg) by mouth daily 90 tablet 3     No Known Allergies  FAMILY HISTORY NOTED AND REVIEWED    REVIEW OF SYSTEMS: above    PHYSICAL EXAM    /84 (BP Location: Right arm, Patient Position: Sitting, Cuff Size: Adult Large)   Pulse 78   Temp 97.3  F (36.3  C) (Temporal)   Resp 16   Ht 1.422 m (4' 8\")   Wt 66.2 kg (146 lb)   SpO2 100%   BMI 32.73 kg/m      Patient appears non toxic  Lungs - clear, normal flow  Cardiovascular - regular rate and rhythm, no murmer, rub or gallop, no jvp or edema, carotids within normal limits, no bruits.  Abdomen - normal active bowel sounds, soft, non tender, no masses, guarding or rebound, no hepatosplenomegaly  She does have some reproducible discomfort in the left groin region but also hurts with range of motion of her left hip.    Labs sent    ASSESSMENT:  1. Left lower groin pain, suspect msk but need to evaluate further for other gi cause, no signs acute abdomen, doubt perf, diverticulitis, etc  2. Niddm, not on meds, ?control  3. Cad, med mgmt  4. Hypertension, controlled  5. Elevated cholesterol on statin    PLAN:  Ct abdomen and pelvis  Labs now  Follow up 6 months or prn  Routine mammogram today      Yoni Baxter M.D.          "

## 2023-03-13 ENCOUNTER — TRANSFERRED RECORDS (OUTPATIENT)
Dept: MULTI SPECIALTY CLINIC | Facility: CLINIC | Age: 68
End: 2023-03-13
Payer: COMMERCIAL

## 2023-03-13 LAB — RETINOPATHY: NORMAL

## 2023-03-17 ENCOUNTER — HOSPITAL ENCOUNTER (OUTPATIENT)
Dept: MAMMOGRAPHY | Facility: CLINIC | Age: 68
Discharge: HOME | End: 2023-03-17
Attending: INTERNAL MEDICINE
Payer: COMMERCIAL

## 2023-03-17 ENCOUNTER — ANCILLARY PROCEDURE (OUTPATIENT)
Dept: CT IMAGING | Facility: CLINIC | Age: 68
End: 2023-03-17
Attending: INTERNAL MEDICINE
Payer: COMMERCIAL

## 2023-03-17 DIAGNOSIS — Z12.31 VISIT FOR SCREENING MAMMOGRAM: ICD-10-CM

## 2023-03-17 DIAGNOSIS — R10.32 ABDOMINAL PAIN, LEFT LOWER QUADRANT: ICD-10-CM

## 2023-03-17 PROCEDURE — 74177 CT ABD & PELVIS W/CONTRAST: CPT

## 2023-03-17 PROCEDURE — 255N000002 HC RX 255 OP 636: Performed by: INTERNAL MEDICINE

## 2023-03-17 PROCEDURE — 77067 SCR MAMMO BI INCL CAD: CPT

## 2023-03-17 RX ADMIN — IOHEXOL 100 ML: 350 INJECTION, SOLUTION INTRAVENOUS at 07:48

## 2023-03-22 ENCOUNTER — TELEPHONE (OUTPATIENT)
Dept: FAMILY MEDICINE | Facility: CLINIC | Age: 68
End: 2023-03-22
Payer: COMMERCIAL

## 2023-03-22 NOTE — TELEPHONE ENCOUNTER
Please make sure patient saw her ct result, it indicates she has not viewed it yet.  Let me know if ongoing symptoms please    Thanks    Yoni Baxter M.D.

## 2023-03-22 NOTE — TELEPHONE ENCOUNTER
Spoke with patient to relay CT scan results and ask about her pain. Patient stated she is still having the lower left quadrant pain by her hip and it usually occurs after she is done working or at night. 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     RN encouraged patient to go to ED if pain worsens or if painful/blood in the urine occurs. Patient agreed with plan of care and did not have any additional questions.     Routing to PCP as update.

## 2023-03-23 NOTE — TELEPHONE ENCOUNTER
Called patient regarding MD message below. She verbalized understanding and will call them.     Dionne Forrester RN on 3/23/2023 at 8:16 AM

## 2023-03-30 ENCOUNTER — OFFICE VISIT (OUTPATIENT)
Dept: FAMILY MEDICINE | Facility: CLINIC | Age: 68
End: 2023-03-30
Payer: COMMERCIAL

## 2023-03-30 ENCOUNTER — TELEPHONE (OUTPATIENT)
Dept: FAMILY MEDICINE | Facility: CLINIC | Age: 68
End: 2023-03-30

## 2023-03-30 VITALS
TEMPERATURE: 97.8 F | HEART RATE: 80 BPM | BODY MASS INDEX: 33.29 KG/M2 | RESPIRATION RATE: 16 BRPM | HEIGHT: 56 IN | SYSTOLIC BLOOD PRESSURE: 113 MMHG | OXYGEN SATURATION: 98 % | DIASTOLIC BLOOD PRESSURE: 79 MMHG | WEIGHT: 148 LBS

## 2023-03-30 DIAGNOSIS — H26.9 CATARACT, UNSPECIFIED CATARACT TYPE, UNSPECIFIED LATERALITY: ICD-10-CM

## 2023-03-30 DIAGNOSIS — Z01.818 PRE-OP EXAMINATION: Primary | ICD-10-CM

## 2023-03-30 DIAGNOSIS — Z23 NEED FOR VACCINATION: ICD-10-CM

## 2023-03-30 DIAGNOSIS — E78.5 HYPERLIPIDEMIA LDL GOAL <130: ICD-10-CM

## 2023-03-30 DIAGNOSIS — E11.9 TYPE 2 DIABETES MELLITUS WITHOUT COMPLICATION, WITHOUT LONG-TERM CURRENT USE OF INSULIN (H): ICD-10-CM

## 2023-03-30 DIAGNOSIS — I10 BENIGN ESSENTIAL HYPERTENSION: ICD-10-CM

## 2023-03-30 DIAGNOSIS — I25.10 ASCVD (ARTERIOSCLEROTIC CARDIOVASCULAR DISEASE): ICD-10-CM

## 2023-03-30 PROCEDURE — 99214 OFFICE O/P EST MOD 30 MIN: CPT | Mod: 25 | Performed by: INTERNAL MEDICINE

## 2023-03-30 PROCEDURE — 90677 PCV20 VACCINE IM: CPT | Performed by: INTERNAL MEDICINE

## 2023-03-30 PROCEDURE — G0009 ADMIN PNEUMOCOCCAL VACCINE: HCPCS | Performed by: INTERNAL MEDICINE

## 2023-03-30 NOTE — TELEPHONE ENCOUNTER
Please abstract the following data from this visit with this patient into the appropriate field in Epic:    Tests that can be patient reported without a hard copy:    Eye exam with ophthalmology on this date: 3/13/23 Exam Location: [facility]    Other Tests found in the patient's chart through Chart Review/Care Everywhere:    Note to Abstraction: If this section is blank, no results were found via Chart Review/Care Everywhere.     Cassidy Dorsey, CMA

## 2023-03-30 NOTE — PATIENT INSTRUCTIONS
Please either send me a note or call the office or stop in around April 10th just to let me know to redo the preop.    Do not take your hydrochlorothiazide on the morning of surgery, but take all the other medicines.    Make sure it is ok with the eye doctor that you continue the aspirin prior to surgery.  If not let me know.    Yoni Baxter M.D.

## 2023-03-30 NOTE — NURSING NOTE
Prior to immunization administration, verified patients identity using patient s name and date of birth. Please see Immunization Activity for additional information.     Screening Questionnaire for Adult Immunization    Are you sick today?   No   Do you have allergies to medications, food, a vaccine component or latex?   No   Have you ever had a serious reaction after receiving a vaccination?   No   Do you have a long-term health problem with heart, lung, kidney, or metabolic disease (e.g., diabetes), asthma, a blood disorder, no spleen, complement component deficiency, a cochlear implant, or a spinal fluid leak?  Are you on long-term aspirin therapy?   No   Do you have cancer, leukemia, HIV/AIDS, or any other immune system problem?   No   Do you have a parent, brother, or sister with an immune system problem?   Don't Know   In the past 3 months, have you taken medications that affect  your immune system, such as prednisone, other steroids, or anticancer drugs; drugs for the treatment of rheumatoid arthritis, Crohn s disease, or psoriasis; or have you had radiation treatments?   No   Have you had a seizure, or a brain or other nervous system problem?   No   During the past year, have you received a transfusion of blood or blood    products, or been given immune (gamma) globulin or antiviral drug?   No   For women: Are you pregnant or is there a chance you could become       pregnant during the next month?   No   Have you received any vaccinations in the past 4 weeks?   No     Immunization questionnaire answers were all negative.      Injection of PCV 20 given by Cassidy Muir MA. Patient instructed to remain in clinic for 15 minutes afterwards, and to report any adverse reactions.     Screening performed by Cassidy Muir MA on 3/30/2023 at 2:39 PM.

## 2023-03-30 NOTE — PROGRESS NOTES
[address], [address]  [facility] [address]  Phone: [phone]  Primary Provider: Yoni Baxter  Pre-op Performing Provider: YONI BAXTER      PREOPERATIVE EVALUATION:  Today's date: 3/30/2023    Teresa Herron is a 67 year old female who presents for a preoperative evaluation.    Surgical Information:  Surgery/Procedure: Cataract Removal Left Eye  Surgery Location: [facility]  Surgeon: Dr Foley  Surgery Date: 5/3/23  Time of Surgery: TBD  Where patient plans to recover: At home with family  Fax number for surgical facility: [phone]        Subjective     HPI related to upcoming procedure: This is a very pleasant 67-year-old diabetic with heart disease who presents for a preop.  With respect to the diabetes this is well controlled and she does not use medication for it.  With respect to the heart disease she has no symptoms. This was found in 2021 when she had chest pain and shortness of breath with a positive stress test and then a coronary angiogram which showed nonobstructive coronary disease without problems since then.    The patient is doing well.  She does not have chest pain or shortness of breath.  She was recently and had labs done as noted and reviewed.  These were all quite stable and her hemoglobin A1c at that time on March 3 was 6.6.                Past Medical History:      Past Medical History:   Diagnosis Date     Arthritis of knee, degenerative     seen by Dr. Jones 2017 and cortisone did not help     ASCVD (arteriosclerotic cardiovascular disease) 06/2021    on angio after pos est for collazo.  Non obstructive cad, med mgmt     Chest pain 04/2015    nl est echo     Dizziness years     GERD (gastroesophageal reflux disease)      H/O colonoscopy 2014    tics, tortuous, fu 5 years     HTN (hypertension) 1988     Hypercholesteraemia 2010     Screening for osteoporosis 10/2020    nl dexa     Type II or unspecified type diabetes  mellitus without mention of complication, not stated as uncontrolled     not on meds as of 2018     Vitamin D deficiency              Past Surgical History:      Past Surgical History:   Procedure Laterality Date     COLONOSCOPY N/A 11/18/2014    Procedure: COLONOSCOPY;  Surgeon: Yoni Carvajal MD;  Location:  GI     CV HEART CATHETERIZATION WITH POSSIBLE INTERVENTION N/A 6/17/2021    Procedure: Coronary Angiogram;  Surgeon: Renata Herring MD;  Location:  HEART CARDIAC CATH LAB     CV LEFT HEART CATH N/A 6/17/2021    Procedure: Left Heart Cath;  Surgeon: Renata Herring MD;  Location:  HEART CARDIAC CATH LAB     fibroid surgery  2000     HYSTERECTOMY, PAP NO LONGER INDICATED  2006    estrada/bso due to pain and bleeding     LAPAROSCOPIC APPENDECTOMY  5/19/2014    Procedure: LAPAROSCOPIC APPENDECTOMY;  Surgeon: Will Barth MD;  Location: [facility]             Social History:     Social History     Tobacco Use     Smoking status: Never     Smokeless tobacco: Never   Substance Use Topics     Alcohol use: No     Alcohol/week: 0.0 standard drinks             Family History:   No family history of bleeding difficulty, anesthesia problems or blood clots.         Allergies:   No Known Allergies          Medications:     Current Outpatient Medications   Medication Sig Dispense Refill     aspirin 81 MG EC tablet Take 81 mg by mouth as needed for moderate pain       cholecalciferol (VITAMIN D3) 25 mcg (1000 units) capsule Take 1 capsule by mouth daily       hydrochlorothiazide (HYDRODIURIL) 12.5 MG tablet Take 1 tablet (12.5 mg) by mouth daily 90 tablet 3     irbesartan (AVAPRO) 300 MG tablet Take 1 tablet (300 mg) by mouth At Bedtime 90 tablet 3     metoprolol succinate ER (TOPROL XL) 25 MG 24 hr tablet Take 2 tablets (50 mg) by mouth daily 180 tablet 2     nitroGLYcerin (NITROSTAT) 0.4 MG sublingual tablet For chest pain place 1 tablet under the tongue every 5 minutes for 3 doses. If symptoms persist 5 minutes  "after 1st dose call 911. 30 tablet 11     omega 3 1000 MG CAPS Take 1 g by mouth daily 90 capsule      rosuvastatin (CRESTOR) 40 MG tablet Take 1 tablet (40 mg) by mouth daily 90 tablet 3               Review of Systems:     No history of bleeding difficulty, anesthesia problems or blood clots.  The 10 point Review of Systems is negative other than noted in the HPI           Physical Exam:   Blood pressure 113/79, pulse 80, temperature 97.8  F (36.6  C), resp. rate 16, height 1.422 m (4' 8\"), weight 67.1 kg (148 lb), SpO2 98 %, not currently breastfeeding.    Constitutional: healthy appearing, alert and in no distress  Heent: Normocephalic. Head without obvious masses or lesions. PERRLDC, EOMI. Mouth exam within normal limits: tongue, mucous membranes, posterior pharynx all normal, no lesions or abnormalities seen. Neck supple, no nuchal rigidity or masses. No supraclavicular, or cervical adenopathy. Thyroid symmetric, no masses.  Cardiovascular: Regular rate and rhythm, no murmer, rub or gallops.  JVP not elevated, no edema.  Carotids within normal limits bilaterally, no bruits.  Respiratory: Normal respiratory effort.  Lungs clear, normal flow, no wheezing or crackles.  Gastrointestinal: Normal active bowel sounds.   Soft, not tender, no masses, guarding or rebound.  No hepatosplenomegaly.   Musculoskeletal: extremities normal, no gross deformities noted.  Skin: no suspicious lesions or rashes   Neurologic: Mental status within normal limits.  Speech fluent.  No gross motor abnormalities and gait intact.  Psychiatric: mentation appears normal and affect normal.         Data:   Noted from prior        Assessment:   1. Normal pre op exam, this patient should be low risk for the anticipated procedure.  2.,  Adult onset diabetes, not on medication with good control.  3.  Coronary artery disease, stable and medically managed.  4.  Hypertension, controlled.  5.  Hyperlipidemia, on statin therapy.         Plan:   The " patient is ok for the surgery  Hold her hydrochlorothiazide the morning of surgery but she should take all her other medications with a sip of water.  I asked her to check with surgery to make sure it is okay to continue her aspirin.  If not she can stop it a week prior  Please check her sugar postop  Prevnar 20 vaccine today.      Yoni Baxter M.D.

## 2023-04-10 ENCOUNTER — TELEPHONE (OUTPATIENT)
Dept: FAMILY MEDICINE | Facility: CLINIC | Age: 68
End: 2023-04-10
Payer: COMMERCIAL

## 2023-04-10 NOTE — TELEPHONE ENCOUNTER
Note in chart from 03/30/23 shows pre-op has been faxed.    Jose E Wade, CMA on 4/10/2023 at 2:30 PM

## 2023-04-10 NOTE — TELEPHONE ENCOUNTER
Forms/Letter Request    Type of form/letter: Pre-op    Have you been seen for this request: Yes 3/30/2023    Do we have the form/letter: Yes: Per pt, it was filled out at appointment on 3/30    Who is the form from? Patient    Where did/will the form come from? Patient or family brought in       When is form/letter needed by: N/A    How would you like the form/letter returned: Fax [phone]    Patient Notified form requests are processed in 3-5 business days:Yes    Could we send this information to you in [employer] or would you prefer to receive a phone call?:   Patient would prefer a phone call   Okay to leave a detailed message?: No at Home number on file [phone] (home)    Per pt, she was told to come into the office today to remind staff to fax her pre-op forms.

## 2023-04-24 ENCOUNTER — PATIENT OUTREACH (OUTPATIENT)
Dept: CARE COORDINATION | Facility: CLINIC | Age: 68
End: 2023-04-24
Payer: COMMERCIAL

## 2023-04-26 ENCOUNTER — TRANSFERRED RECORDS (OUTPATIENT)
Dept: HEALTH INFORMATION MANAGEMENT | Facility: CLINIC | Age: 68
End: 2023-04-26
Payer: COMMERCIAL

## 2023-05-08 ENCOUNTER — PATIENT OUTREACH (OUTPATIENT)
Dept: CARE COORDINATION | Facility: CLINIC | Age: 68
End: 2023-05-08
Payer: COMMERCIAL

## 2023-07-23 ENCOUNTER — HEALTH MAINTENANCE LETTER (OUTPATIENT)
Age: 68
End: 2023-07-23

## 2023-09-12 ENCOUNTER — OFFICE VISIT (OUTPATIENT)
Dept: FAMILY MEDICINE | Facility: CLINIC | Age: 68
End: 2023-09-12
Payer: COMMERCIAL

## 2023-09-12 VITALS
RESPIRATION RATE: 16 BRPM | BODY MASS INDEX: 33.97 KG/M2 | WEIGHT: 151 LBS | HEIGHT: 56 IN | TEMPERATURE: 97.7 F | DIASTOLIC BLOOD PRESSURE: 85 MMHG | OXYGEN SATURATION: 97 % | SYSTOLIC BLOOD PRESSURE: 135 MMHG | HEART RATE: 75 BPM

## 2023-09-12 DIAGNOSIS — M17.0 PRIMARY OSTEOARTHRITIS OF BOTH KNEES: ICD-10-CM

## 2023-09-12 DIAGNOSIS — Z23 NEED FOR PROPHYLACTIC VACCINATION AND INOCULATION AGAINST INFLUENZA: ICD-10-CM

## 2023-09-12 DIAGNOSIS — E78.5 HYPERLIPIDEMIA LDL GOAL <130: ICD-10-CM

## 2023-09-12 DIAGNOSIS — I10 BENIGN ESSENTIAL HYPERTENSION: ICD-10-CM

## 2023-09-12 DIAGNOSIS — Z12.11 SPECIAL SCREENING FOR MALIGNANT NEOPLASMS, COLON: ICD-10-CM

## 2023-09-12 DIAGNOSIS — I25.10 ASCVD (ARTERIOSCLEROTIC CARDIOVASCULAR DISEASE): ICD-10-CM

## 2023-09-12 DIAGNOSIS — E66.9 NON MORBID OBESITY, UNSPECIFIED OBESITY TYPE: ICD-10-CM

## 2023-09-12 DIAGNOSIS — E11.9 TYPE 2 DIABETES MELLITUS WITHOUT COMPLICATION, WITHOUT LONG-TERM CURRENT USE OF INSULIN (H): Primary | ICD-10-CM

## 2023-09-12 PROBLEM — Z98.890 STATUS POST CORONARY ANGIOGRAM: Status: RESOLVED | Noted: 2021-06-17 | Resolved: 2023-09-12

## 2023-09-12 LAB
CHOLEST SERPL-MCNC: 182 MG/DL
HBA1C MFR BLD: 6.8 % (ref 0–5.6)
HDLC SERPL-MCNC: 48 MG/DL
LDLC SERPL CALC-MCNC: 96 MG/DL
NONHDLC SERPL-MCNC: 134 MG/DL
TRIGL SERPL-MCNC: 188 MG/DL

## 2023-09-12 PROCEDURE — 90662 IIV NO PRSV INCREASED AG IM: CPT | Performed by: INTERNAL MEDICINE

## 2023-09-12 PROCEDURE — 36415 COLL VENOUS BLD VENIPUNCTURE: CPT | Performed by: INTERNAL MEDICINE

## 2023-09-12 PROCEDURE — 80061 LIPID PANEL: CPT | Performed by: INTERNAL MEDICINE

## 2023-09-12 PROCEDURE — G0008 ADMIN INFLUENZA VIRUS VAC: HCPCS | Performed by: INTERNAL MEDICINE

## 2023-09-12 PROCEDURE — 99214 OFFICE O/P EST MOD 30 MIN: CPT | Mod: 25 | Performed by: INTERNAL MEDICINE

## 2023-09-12 PROCEDURE — 83036 HEMOGLOBIN GLYCOSYLATED A1C: CPT | Performed by: INTERNAL MEDICINE

## 2023-09-12 ASSESSMENT — PAIN SCALES - GENERAL: PAINLEVEL: NO PAIN (0)

## 2023-09-12 NOTE — RESULT ENCOUNTER NOTE
It was very nice to see you today.  You should be able to view your test results.    Your diabetes test or hemoglobin A1c remains good at 6.8.    Your total cholesterol is good at 182.  Your HDL or good cholesterol is a bit lower at 48.  Your LDL or bad cholesterol is a bit higher at 96.  Given your history of heart issues we really want to see the LDL below 70.  Please make sure to take the cholesterol pill every day but I would also strongly encourage exercise and getting your weight down 10 pounds.  This should help the numbers quite a bit.    Please let me know if you have questions.    Yoni Baxter M.D.

## 2023-09-12 NOTE — PROGRESS NOTES
This is a very pleasant 68-year-old here for follow-up on multiple issues.    Patient has diabetes for which she is not on medication and for which her control is good.  She is up-to-date on her eye exam and no sores on her feet or toes.  She does not check her sugar.  She is not exercising and her weight remains an issue.    The patient has coronary artery disease for which she is medically managed.  She has no symptoms.    Her biggest issue relates to her osteoarthritis of her knees and shoulders.  She does see Ortho for this.    She has hyperlipidemia and hypertension.  She is due for colon cancer screening.  She otherwise is doing well and feels fine.    Past Medical History:   Diagnosis Date    Arthritis of knee, degenerative     seen by Dr. Jones 2017 and cortisone did not help    ASCVD (arteriosclerotic cardiovascular disease) 06/2021    on angio after pos est for collazo.  Non obstructive cad, med mgmt    Chest pain 04/2015    nl est echo    Dizziness years    GERD (gastroesophageal reflux disease)     H/O colonoscopy 2014    tics, tortuous, fu 5 years    HTN (hypertension) 1988    Hypercholesteraemia 2010    Screening for osteoporosis 10/2020    nl dexa    Type II or unspecified type diabetes mellitus without mention of complication, not stated as uncontrolled     not on meds as of 2018    Vitamin D deficiency      Past Surgical History:   Procedure Laterality Date    CATARACT EXTRACTION  05/2023    COLONOSCOPY N/A 11/18/2014    Procedure: COLONOSCOPY;  Surgeon: Yoni Carvajal MD;  Location:  GI    CV HEART CATHETERIZATION WITH POSSIBLE INTERVENTION N/A 06/17/2021    Procedure: Coronary Angiogram;  Surgeon: Renata Herring MD;  Location:  HEART CARDIAC CATH LAB    CV LEFT HEART CATH N/A 06/17/2021    Procedure: Left Heart Cath;  Surgeon: Renata Herring MD;  Location:  HEART CARDIAC CATH LAB    fibroid surgery  01/01/2000    HYSTERECTOMY, PAP NO LONGER INDICATED  01/01/2006    estrada/bso due to pain and  bleeding    LAPAROSCOPIC APPENDECTOMY  05/19/2014    Procedure: LAPAROSCOPIC APPENDECTOMY;  Surgeon: Will Barth MD;  Location: [facility]     Social History     Socioeconomic History    Marital status:      Spouse name: Not on file    Number of children: 1    Years of education: Not on file    Highest education level: Not on file   Occupational History    Occupation: work dietary, in kitchen     Employer: [employer]   Tobacco Use    Smoking status: Never    Smokeless tobacco: Never   Substance and Sexual Activity    Alcohol use: No     Alcohol/week: 0.0 standard drinks of alcohol    Drug use: No    Sexual activity: Not Currently   Other Topics Concern    Not on file   Social History Narrative    Not on file     Social Determinants of Health     Financial Resource Strain: Not on file   Food Insecurity: Not on file   Transportation Needs: Not on file   Physical Activity: Not on file   Stress: Not on file   Social Connections: Not on file   Intimate Partner Violence: Not on file   Housing Stability: Not on file     Current Outpatient Medications   Medication Sig Dispense Refill    aspirin 81 MG EC tablet Take 81 mg by mouth as needed for moderate pain      cholecalciferol (VITAMIN D3) 25 mcg (1000 units) capsule Take 1 capsule by mouth daily      hydrochlorothiazide (HYDRODIURIL) 12.5 MG tablet Take 1 tablet (12.5 mg) by mouth daily 90 tablet 3    irbesartan (AVAPRO) 300 MG tablet Take 1 tablet (300 mg) by mouth At Bedtime 90 tablet 3    metoprolol succinate ER (TOPROL XL) 25 MG 24 hr tablet Take 2 tablets (50 mg) by mouth daily 180 tablet 2    nitroGLYcerin (NITROSTAT) 0.4 MG sublingual tablet For chest pain place 1 tablet under the tongue every 5 minutes for 3 doses. If symptoms persist 5 minutes after 1st dose call 911. 30 tablet 11    omega 3 1000 MG CAPS Take 1 g by mouth daily 90 capsule     rosuvastatin (CRESTOR) 40 MG tablet Take 1 tablet (40 mg) by mouth daily 90 tablet 3  "    No Known Allergies  FAMILY HISTORY NOTED AND REVIEWED    REVIEW OF SYSTEMS: above    PHYSICAL EXAM    /85 (BP Location: Right arm, Patient Position: Sitting, Cuff Size: Adult Large)   Pulse 75   Temp 97.7  F (36.5  C) (Temporal)   Resp 16   Ht 1.422 m (4' 8\")   Wt 68.5 kg (151 lb)   SpO2 97%   BMI 33.85 kg/m      Patient appears non toxic    Lungs - clear, normal flow  Cardiovascular - regular rate and rhythm, no murmer, rub or gallop, no jvp or edema, carotids within normal limits, no bruits.  Abdomen - normal active bowel sounds, soft, non tender, no masses, guarding or rebound, no hepatosplenomegaly  Feet within normal limits, no lesions or sores    Labs sent    ASSESSMENT:  Diabetes, no c/o, not on meds, up to date eye exam, labs to be done today, feet fine  Cad, med mgmt  Hypertension, controlled  Elevated cholesterol on statiun  Oa, follow up ortho  Obesity, weight loss rec  hcm    PLAN:  Colonoscopy  Flu shot  Covid at pharm  Exercise, diet  Letter with labs      Yoni Baxter M.D.    "

## 2024-02-12 NOTE — MR AVS SNAPSHOT
After Visit Summary   4/17/2018    Teresa Herron    MRN: [id number]           Patient Information     Date Of Birth          1955        Visit Information        Provider Department      4/17/2018 10:30 AM Yoni Baxter MD [facility]        Today's Diagnoses     Cough    -  1    Viral URI          Care Instructions    Use the cough syrup at night, it will make you drowsy    During the day use tylenol as needed, also use mucinex twice daily to bring up the phlegm.  You should be better in the next 3 days but if you get worse call    Yoni Baxter M.D.            Follow-ups after your visit        Your next 10 appointments already scheduled     Apr 17, 2018 10:30 AM CDT   Office Visit with Yoni Baxter MD   [facility]    [address] [id number] [phone]           Bring a current list of meds and any records pertaining to this visit. For Physicals, please bring immunization records and any forms needing to be filled out. Please arrive 10 minutes early to complete paperwork.              Who to contact     If you have questions or need follow up information about today's clinic visit or your schedule please contact [facility] directly at [phone].  Normal or non-critical lab and imaging results will be communicated to you by YesVideohart, letter or phone within 4 business days after the clinic has received the results. If you do not hear from us within 7 days, please contact the clinic through YesVideohart or phone. If you have a critical or abnormal lab result, we will notify you by phone as soon as possible.  Submit refill requests through Tink or call your pharmacy and they will forward the refill request to us. Please allow 3 business days for your refill to be completed.          Additional Information About Your Visit        Tink Information     Tink lets you send messages to your doctor,  "view your test results, renew your prescriptions, schedule appointments and more. To sign up, go to [email or web address] . Click on \"Log in\" on the left side of the screen, which will take you to the Welcome page. Then click on \"Sign up Now\" on the right side of the page.     You will be asked to enter the access code listed below, as well as some personal information. Please follow the directions to create your username and password.     Your access code is: [email or web address]  Expires: 2018  8:05 AM     Your access code will  in 90 days. If you need help or a new code, please call your [place] clinic or [phone].        Care EveryWhere ID     This is your Care EveryWhere ID. This could be used by other organizations to access your [place] medical records  [id number]        Your Vitals Were     Pulse Temperature Height Pulse Oximetry Breastfeeding? BMI (Body Mass Index)    79 97.8  F (36.6  C) (Oral) 4' 10\" (1.473 m) 99% No 30.72 kg/m2       Blood Pressure from Last 3 Encounters:   18 130/84   17 131/77   16 135/88    Weight from Last 3 Encounters:   18 147 lb (66.7 kg)   17 147 lb 3.2 oz (66.8 kg)   16 148 lb 14.4 oz (67.5 kg)              Today, you had the following     No orders found for display         Today's Medication Changes          These changes are accurate as of 18  8:05 AM.  If you have any questions, ask your nurse or doctor.               Start taking these medicines.        Dose/Directions    guaiFENesin-codeine 100-10 MG/5ML Soln solution   Commonly known as:  ROBITUSSIN AC   Used for:  Cough, Viral URI   Started by:  Yoni Baxter MD        Dose:  1-2 tsp.   Take 5-10 mLs by mouth every 6 hours as needed for cough   Quantity:  120 mL   Refills:  0            Where to get your medicines      Some of these will need a paper prescription and others can be bought over the counter.  Ask your nurse if you have questions.     Bring a " paper prescription for each of these medications     guaiFENesin-codeine 100-10 MG/5ML Soln solution                Primary Care Provider Office Phone # Fax #    Yoni Baxter [phone] [address] [address]        Equal Access to Services     [facility] Center[facility]OUGLAS : Hadii aad ku hadasho Soomaali, waaxda luqadaha, qaybta kaalmada adeegyada, waxay idiin hayaan adeeg ashleyrichard la'aan . So [facility] [phone].    ATENCIÓN: Si habla español, tiene a farias disposición servicios gratuitos de asistencia lingüística. Llame al [phone].    We comply with applicable federal civil rights laws and [place] laws. We do not discriminate on the basis of race, color, national origin, age, disability, sex, sexual orientation, or gender identity.            Thank you!     Thank you for choosing [facility]  for your care. Our goal is always to provide you with excellent care. Hearing back from our patients is one way we can continue to improve our services. Please take a few minutes to complete the written survey that you may receive in the mail after your visit with us. Thank you!             Your Updated Medication List - Protect others around you: Learn how to safely use, store and throw away your medicines at www.disposemymeds.org.          This list is accurate as of 4/17/18  8:05 AM.  Always use your most recent med list.                   Brand Name Dispense Instructions for use Diagnosis    ASPIRIN NOT PRESCRIBED    INTENTIONAL    1 each    1 each continuous prn for other Antiplatelet medication not prescribed intentionally due to Not indicated based on age    Type 2 diabetes, HbA1c goal < 7% (H)       guaiFENesin-codeine 100-10 MG/5ML Soln solution    ROBITUSSIN AC    120 mL    Take 5-10 mLs by mouth every 6 hours as needed for cough    Cough, Viral URI       hydrochlorothiazide 12.5 MG Tabs tablet     90 tablet    Take 1 tablet (12.5 mg) by mouth daily    Benign essential  hypertension       lisinopril 20 MG tablet    PRINIVIL/ZESTRIL    90 tablet    TAKE 1 TABLET (20 MG) BY MOUTH DAILY    Benign essential hypertension       omega 3 1000 MG Caps     90 capsule    Take 1 g by mouth daily        simvastatin 40 MG tablet    ZOCOR    90 tablet    Take 1 tablet (40 mg) by mouth At Bedtime    Hyperlipidemia LDL goal <130       vitamin D 2000 units tablet     30 tablet    TAKE ONE TABLET BY MOUTH ONE TIME DAILY    Vitamin D deficiency          12-Feb-2024 11:20

## 2024-03-12 ENCOUNTER — OFFICE VISIT (OUTPATIENT)
Dept: FAMILY MEDICINE | Facility: CLINIC | Age: 69
End: 2024-03-12
Payer: COMMERCIAL

## 2024-03-12 VITALS
HEIGHT: 56 IN | BODY MASS INDEX: 35.32 KG/M2 | TEMPERATURE: 98.3 F | SYSTOLIC BLOOD PRESSURE: 133 MMHG | DIASTOLIC BLOOD PRESSURE: 84 MMHG | HEART RATE: 70 BPM | WEIGHT: 157 LBS | OXYGEN SATURATION: 99 % | RESPIRATION RATE: 16 BRPM

## 2024-03-12 DIAGNOSIS — E66.01 CLASS 2 SEVERE OBESITY DUE TO EXCESS CALORIES WITH SERIOUS COMORBIDITY AND BODY MASS INDEX (BMI) OF 35.0 TO 35.9 IN ADULT (H): ICD-10-CM

## 2024-03-12 DIAGNOSIS — E66.812 CLASS 2 SEVERE OBESITY DUE TO EXCESS CALORIES WITH SERIOUS COMORBIDITY AND BODY MASS INDEX (BMI) OF 35.0 TO 35.9 IN ADULT (H): ICD-10-CM

## 2024-03-12 DIAGNOSIS — K21.9 GASTROESOPHAGEAL REFLUX DISEASE WITHOUT ESOPHAGITIS: ICD-10-CM

## 2024-03-12 DIAGNOSIS — Z12.11 SPECIAL SCREENING FOR MALIGNANT NEOPLASMS, COLON: ICD-10-CM

## 2024-03-12 DIAGNOSIS — R94.39 ABNORMAL CARDIOVASCULAR STRESS TEST: ICD-10-CM

## 2024-03-12 DIAGNOSIS — E78.5 HYPERLIPIDEMIA LDL GOAL <130: ICD-10-CM

## 2024-03-12 DIAGNOSIS — I10 BENIGN ESSENTIAL HYPERTENSION: ICD-10-CM

## 2024-03-12 DIAGNOSIS — I25.10 ASCVD (ARTERIOSCLEROTIC CARDIOVASCULAR DISEASE): ICD-10-CM

## 2024-03-12 DIAGNOSIS — E11.9 TYPE 2 DIABETES MELLITUS WITHOUT COMPLICATION, WITHOUT LONG-TERM CURRENT USE OF INSULIN (H): Primary | ICD-10-CM

## 2024-03-12 LAB
ANION GAP SERPL CALCULATED.3IONS-SCNC: 8 MMOL/L (ref 7–15)
BUN SERPL-MCNC: 12.9 MG/DL (ref 8–23)
CALCIUM SERPL-MCNC: 9.2 MG/DL (ref 8.8–10.2)
CHLORIDE SERPL-SCNC: 106 MMOL/L (ref 98–107)
CHOLEST SERPL-MCNC: 144 MG/DL
CREAT SERPL-MCNC: 0.59 MG/DL (ref 0.51–0.95)
CREAT UR-MCNC: 96.5 MG/DL
DEPRECATED HCO3 PLAS-SCNC: 27 MMOL/L (ref 22–29)
EGFRCR SERPLBLD CKD-EPI 2021: >90 ML/MIN/1.73M2
ERYTHROCYTE [DISTWIDTH] IN BLOOD BY AUTOMATED COUNT: 13.4 % (ref 10–15)
FASTING STATUS PATIENT QL REPORTED: YES
GLUCOSE SERPL-MCNC: 150 MG/DL (ref 70–99)
HBA1C MFR BLD: 7.2 % (ref 0–5.6)
HCT VFR BLD AUTO: 37.3 % (ref 35–47)
HDLC SERPL-MCNC: 49 MG/DL
HGB BLD-MCNC: 11.4 G/DL (ref 11.7–15.7)
LDLC SERPL CALC-MCNC: 78 MG/DL
MCH RBC QN AUTO: 27.4 PG (ref 26.5–33)
MCHC RBC AUTO-ENTMCNC: 30.6 G/DL (ref 31.5–36.5)
MCV RBC AUTO: 90 FL (ref 78–100)
MICROALBUMIN UR-MCNC: 12.9 MG/L
MICROALBUMIN/CREAT UR: 13.37 MG/G CR (ref 0–25)
NONHDLC SERPL-MCNC: 95 MG/DL
PLATELET # BLD AUTO: 250 10E3/UL (ref 150–450)
POTASSIUM SERPL-SCNC: 4.7 MMOL/L (ref 3.4–5.3)
RBC # BLD AUTO: 4.16 10E6/UL (ref 3.8–5.2)
SODIUM SERPL-SCNC: 141 MMOL/L (ref 135–145)
TRIGL SERPL-MCNC: 86 MG/DL
WBC # BLD AUTO: 6.3 10E3/UL (ref 4–11)

## 2024-03-12 PROCEDURE — 82043 UR ALBUMIN QUANTITATIVE: CPT | Performed by: INTERNAL MEDICINE

## 2024-03-12 PROCEDURE — 80061 LIPID PANEL: CPT | Performed by: INTERNAL MEDICINE

## 2024-03-12 PROCEDURE — 85027 COMPLETE CBC AUTOMATED: CPT | Performed by: INTERNAL MEDICINE

## 2024-03-12 PROCEDURE — 80048 BASIC METABOLIC PNL TOTAL CA: CPT | Performed by: INTERNAL MEDICINE

## 2024-03-12 PROCEDURE — 82570 ASSAY OF URINE CREATININE: CPT | Performed by: INTERNAL MEDICINE

## 2024-03-12 PROCEDURE — 83036 HEMOGLOBIN GLYCOSYLATED A1C: CPT | Performed by: INTERNAL MEDICINE

## 2024-03-12 PROCEDURE — 36415 COLL VENOUS BLD VENIPUNCTURE: CPT | Performed by: INTERNAL MEDICINE

## 2024-03-12 PROCEDURE — 99214 OFFICE O/P EST MOD 30 MIN: CPT | Performed by: INTERNAL MEDICINE

## 2024-03-12 RX ORDER — METOPROLOL SUCCINATE 25 MG/1
50 TABLET, EXTENDED RELEASE ORAL DAILY
Qty: 180 TABLET | Refills: 2 | Status: SHIPPED | OUTPATIENT
Start: 2024-03-12

## 2024-03-12 RX ORDER — IRBESARTAN 300 MG/1
300 TABLET ORAL AT BEDTIME
Qty: 90 TABLET | Refills: 3 | Status: SHIPPED | OUTPATIENT
Start: 2024-03-12

## 2024-03-12 RX ORDER — ROSUVASTATIN CALCIUM 40 MG/1
40 TABLET, COATED ORAL DAILY
Qty: 90 TABLET | Refills: 3 | Status: SHIPPED | OUTPATIENT
Start: 2024-03-12 | End: 2024-03-12

## 2024-03-12 RX ORDER — ROSUVASTATIN CALCIUM 40 MG/1
40 TABLET, COATED ORAL DAILY
Status: SHIPPED
Start: 2024-03-12 | End: 2024-04-02

## 2024-03-12 RX ORDER — HYDROCHLOROTHIAZIDE 12.5 MG/1
12.5 TABLET ORAL DAILY
Qty: 90 TABLET | Refills: 3 | Status: SHIPPED | OUTPATIENT
Start: 2024-03-12

## 2024-03-12 ASSESSMENT — PAIN SCALES - GENERAL: PAINLEVEL: NO PAIN (0)

## 2024-03-12 NOTE — PATIENT INSTRUCTIONS
Please check with your insurance and see either Ozempic or Mounjaro are covered for treatment of your diabetes along with weight loss.    Get the colon exam and mammogram    Please get the covid shot at your pharmacy.    I want you to stop the rosuvastatin and let's see if the pains get better.  It can take up to 3 to 4 weeks for this to improve.  I want you to send me a message in 3 to 4 weeks and let me know how the aches are.     Yoni Baxter M.D.

## 2024-03-12 NOTE — PROGRESS NOTES
This is a very pleasant 68-year-old here for follow-up on multiple issues.  The patient has diabetes for which she is not on medication.  Her control has been very good with her last A1c of 6.8.  She is up-to-date on her eye exam.  She does not check her sugars.  She is not exercising.    The patient's weight is a significant issue.  It is higher as noted.    The patient has coronary artery disease.  She has no chest pain or shortness of breath.    The patient has hypertension and hyperlipidemia.    She has a fair amount of aches and pains mostly in the knees but also somewhat diffusely.  She does not have chest pain or shortness of breath or GI issues.  She has not done a colonoscopy yet.  She is not exercising regularly.    She is not working, she cares for her 2-year-old grandson    Past Medical History:   Diagnosis Date    Arthritis of knee, degenerative     seen by Dr. Jones 2017 and cortisone did not help    ASCVD (arteriosclerotic cardiovascular disease) 06/2021    on angio after pos est for collazo.  Non obstructive cad, med mgmt    Chest pain 04/2015    nl est echo    Dizziness years    GERD (gastroesophageal reflux disease)     H/O colonoscopy 2014    tics, tortuous, fu 5 years    HTN (hypertension) 1988    Hypercholesteraemia 2010    Screening for osteoporosis 10/2020    nl dexa    Type II or unspecified type diabetes mellitus without mention of complication, not stated as uncontrolled     not on meds as of 2018    Vitamin D deficiency      Past Surgical History:   Procedure Laterality Date    CATARACT EXTRACTION  05/2023    COLONOSCOPY N/A 11/18/2014    Procedure: COLONOSCOPY;  Surgeon: Yoni Carvajal MD;  Location:  GI    CV HEART CATHETERIZATION WITH POSSIBLE INTERVENTION N/A 06/17/2021    Procedure: Coronary Angiogram;  Surgeon: Renata Herring MD;  Location:  HEART CARDIAC CATH LAB    CV LEFT HEART CATH N/A 06/17/2021    Procedure: Left Heart Cath;  Surgeon: Renata Herring MD;  Location:   HEART CARDIAC CATH LAB    fibroid surgery  01/01/2000    HYSTERECTOMY, PAP NO LONGER INDICATED  01/01/2006    estrada/bso due to pain and bleeding    LAPAROSCOPIC APPENDECTOMY  05/19/2014    Procedure: LAPAROSCOPIC APPENDECTOMY;  Surgeon: Will Barth MD;  Location: [facility]     Social History     Socioeconomic History    Marital status:      Spouse name: Not on file    Number of children: 1    Years of education: Not on file    Highest education level: Not on file   Occupational History    Occupation: work dietary, in kitchen     Employer: [employer]   Tobacco Use    Smoking status: Never    Smokeless tobacco: Never   Substance and Sexual Activity    Alcohol use: No     Alcohol/week: 0.0 standard drinks of alcohol    Drug use: No    Sexual activity: Not Currently   Other Topics Concern    Not on file   Social History Narrative    Not on file     Social Determinants of Health     Financial Resource Strain: Not on file   Food Insecurity: Not on file   Transportation Needs: Not on file   Physical Activity: Not on file   Stress: Not on file   Social Connections: Not on file   Interpersonal Safety: Not on file   Housing Stability: Not on file     Current Outpatient Medications   Medication Sig Dispense Refill    aspirin 81 MG EC tablet Take 81 mg by mouth as needed for moderate pain      cholecalciferol (VITAMIN D3) 25 mcg (1000 units) capsule Take 1 capsule by mouth daily      hydroCHLOROthiazide 12.5 MG tablet Take 1 tablet (12.5 mg) by mouth daily 90 tablet 3    irbesartan (AVAPRO) 300 MG tablet Take 1 tablet (300 mg) by mouth at bedtime 90 tablet 3    metoprolol succinate ER (TOPROL XL) 25 MG 24 hr tablet Take 2 tablets (50 mg) by mouth daily 180 tablet 2    nitroGLYcerin (NITROSTAT) 0.4 MG sublingual tablet For chest pain place 1 tablet under the tongue every 5 minutes for 3 doses. If symptoms persist 5 minutes after 1st dose call 911. 30 tablet 11    omega 3 1000 MG CAPS Take 1 g by  "mouth daily 90 capsule     rosuvastatin (CRESTOR) 40 MG tablet Take 1 tablet (40 mg) by mouth daily       No Known Allergies  FAMILY HISTORY NOTED AND REVIEWED    REVIEW OF SYSTEMS: above    PHYSICAL EXAM    /84 (BP Location: Right arm, Patient Position: Sitting, Cuff Size: Adult Large)   Pulse 70   Temp 98.3  F (36.8  C) (Oral)   Resp 16   Ht 1.422 m (4' 8\")   Wt 71.2 kg (157 lb)   SpO2 99%   BMI 35.20 kg/m      Patient appears non toxic  Lungs - clear, normal flow  Cardiovascular - regular rate and rhythm, no murmer, rub or gallop, no jvp or edema, carotids within normal limits, no bruits.  Abdomen - normal active bowel sounds, soft, non tender, no masses, guarding or rebound, no hepatosplenomegaly  Feet exam are normal, slightly decreased pulses but no sores  Examination of the joints of her wrist and fingers reveal no redness, swelling or tenderness    Labs sent    ASSESSMENT:  Non-insulin-dependent diabetes without complications for which she is not on medication.  Weight loss is strongly recommended.  I would like her to check with insurance to see if Ozempic or Mounjaro are covered which I think would be excellent agents for her.  Morbid obesity, as above, consider GLP-1 agent  Hypertension, controlled  Hyperlipidemia, on Crestor but I would do wonder if some of her aches are related  Coronary disease, med management  Healthcare maintenance    PLAN:  Stop her Crestor and send me an update in 3 to 4 weeks regarding her aches  Exercise  Letter with labs  She will check with insurance on the GLP-1 agents  Follow-up in 6 months  Routine colonoscopy  COVID booster at pharmacy      Yoni Baxter M.D.        "

## 2024-03-13 NOTE — RESULT ENCOUNTER NOTE
It was nice to see you.  Your should be able to view the lab results.    Your A1c is higher.  Please let me know if your insurance covers one of the medicines I wrote down for you.    Your blood count, chemistries and urine are fine.  Your cholesterol is ok as well.    Yoni Baxter M.D.

## 2024-03-20 ENCOUNTER — TELEPHONE (OUTPATIENT)
Dept: GASTROENTEROLOGY | Facility: CLINIC | Age: 69
End: 2024-03-20
Payer: COMMERCIAL

## 2024-03-20 DIAGNOSIS — Z12.11 SPECIAL SCREENING FOR MALIGNANT NEOPLASMS, COLON: Primary | ICD-10-CM

## 2024-03-20 NOTE — TELEPHONE ENCOUNTER
"Endoscopy Scheduling Screen    Have you had a positive Covid test in the last 14 days?  No    What is your communication preference for Instructions and/or Bowel Prep?   MyChart    What insurance is in the chart?  Other:  [facility]    Ordering/Referring Provider:   SEGUN KELLY        (If ordering provider performs procedure, schedule with ordering provider unless otherwise instructed. )    BMI: Estimated body mass index is 35.2 kg/m  as calculated from the following:    Height as of 3/12/24: 1.422 m (4' 8\").    Weight as of 3/12/24: 71.2 kg (157 lb).     Sedation Ordered  moderate sedation.   If patient BMI > 50 do not schedule in [facility].    If patient BMI > 45 do not schedule at [facility].    Are you taking methadone or Suboxone?  No    Have you had difficulties, pain, or discomfort during past endoscopy procedures?  No    Are you taking any prescription medications for pain 3 or more times per week?   NO, No RN review required.    Do you have a history of malignant hyperthermia?  No    (Females) Are you currently pregnant?   No     Have you been diagnosed or told you have pulmonary hypertension?   No    Do you have an LVAD?  No    Have you been told you have moderate to severe sleep apnea?  No    Have you been told you have COPD, asthma, or any other lung disease?  No    Do you have any heart conditions?  No     Have you ever had or are you waiting for an organ transplant?  No. Continue scheduling, no site restrictions.    Have you had a stroke or transient ischemic attack (TIA aka \"mini stroke\" in the last 6 months?   No    Have you been diagnosed with or been told you have cirrhosis of the liver?   No    Are you currently on dialysis?   No    Do you need assistance transferring?   No    BMI: Estimated body mass index is 35.2 kg/m  as calculated from the following:    Height as of 3/12/24: 1.422 m (4' 8\").    Weight as of 3/12/24: 71.2 kg (157 lb).     Is patients BMI > 40 and scheduling location [facility]?  No    Do you " take an injectable medication for weight loss or diabetes (excluding insulin)?  No    Do you take the medication Naltrexone?  No    Do you take blood thinners?  No       Prep   Are you currently on dialysis or do you have chronic kidney disease?  No    Do you have a diagnosis of diabetes?  No    Do you have a diagnosis of cystic fibrosis (CF)?  No    On a regular basis do you go 3 -5 days between bowel movements?  No    BMI > 40?  No    Preferred Pharmacy:    [facility] [id number] [place] [facility] [address]   [address] [facility] [address]  Phone: [phone] Fax: [phone]        Final Scheduling Details     Procedure scheduled  Colonoscopy    Surgeon:  Delicia     Date of procedure:  6/24     Pre-OP / PAC:   No - Not required for this site.    Location  [facility] - Patient preference.    Sedation   Moderate Sedation - Per order.      Patient Reminders:   You will receive a call from a Nurse to review instructions and health history.  This assessment must be completed prior to your procedure.  Failure to complete the Nurse assessment may result in the procedure being cancelled.      On the day of your procedure, please designate an adult(s) who can drive you home stay with you for the next 24 hours. The medicines used in the exam will make you sleepy. You will not be able to drive.      You cannot take public transportation, ride share services, or non-medical taxi service without a responsible caregiver.  Medical transport services are allowed with the requirement that a responsible caregiver will receive you at your destination.  We require that drivers and caregivers are confirmed prior to your procedure.

## 2024-04-02 ENCOUNTER — VIRTUAL VISIT (OUTPATIENT)
Dept: FAMILY MEDICINE | Facility: CLINIC | Age: 69
End: 2024-04-02
Payer: COMMERCIAL

## 2024-04-02 DIAGNOSIS — E78.5 HYPERLIPIDEMIA LDL GOAL <130: ICD-10-CM

## 2024-04-02 DIAGNOSIS — I25.10 ASCVD (ARTERIOSCLEROTIC CARDIOVASCULAR DISEASE): ICD-10-CM

## 2024-04-02 DIAGNOSIS — E66.01 CLASS 2 SEVERE OBESITY DUE TO EXCESS CALORIES WITH SERIOUS COMORBIDITY AND BODY MASS INDEX (BMI) OF 35.0 TO 35.9 IN ADULT (H): ICD-10-CM

## 2024-04-02 DIAGNOSIS — M79.10 MYALGIA: ICD-10-CM

## 2024-04-02 DIAGNOSIS — E66.812 CLASS 2 SEVERE OBESITY DUE TO EXCESS CALORIES WITH SERIOUS COMORBIDITY AND BODY MASS INDEX (BMI) OF 35.0 TO 35.9 IN ADULT (H): ICD-10-CM

## 2024-04-02 DIAGNOSIS — E11.9 TYPE 2 DIABETES MELLITUS WITHOUT COMPLICATION, WITHOUT LONG-TERM CURRENT USE OF INSULIN (H): Primary | ICD-10-CM

## 2024-04-02 PROCEDURE — 99442 PR PHYSICIAN TELEPHONE EVALUATION 11-20 MIN: CPT | Mod: 93 | Performed by: INTERNAL MEDICINE

## 2024-04-02 RX ORDER — SIMVASTATIN 20 MG
20 TABLET ORAL AT BEDTIME
Qty: 90 TABLET | Refills: 3 | Status: SHIPPED | OUTPATIENT
Start: 2024-04-02

## 2024-04-02 NOTE — PROGRESS NOTES
Teresa is a 68 year old who is being evaluated via a billable telephone visit.    What phone number would you like to be contacted at? [phone]    How would you like to obtain your AVS? Haresh  Originating Location (pt. Location): Home    Distant Location (provider location):  On-site    This is a phone call.  The patient has stopped her crestor 3/12/2024 during last office visit and notes the body aches are better but the knees are the same.  She has tried Lipitor in the past and had aches.  It is a little unclear if she has tried Zocor.  She does have coronary disease as noted.    She does have diabetes.  Per the patient she checked with her insurance and they will potentially cover Mounjaro or Ozempic with the prior authorization needed.  However, she would rather not start that now.    I discussed all of this with the patient.  I like to try different statin and I will send in Zocor.  I told her that if she does start to have aches she needs to stop it and let me know and she will.    With respect to the weight loss medications and her diabetes for now we will hold off and she has an appointment with me in September.  She would like to try diet and will reevaluate then.    Yoni Baxter M.D.  12 minutes on the day of the encounter doing chart review, history and exam, documentation and further activities as noted above.

## 2024-05-13 ENCOUNTER — TRANSFERRED RECORDS (OUTPATIENT)
Dept: MULTI SPECIALTY CLINIC | Facility: CLINIC | Age: 69
End: 2024-05-13

## 2024-05-13 LAB — RETINOPATHY: NORMAL

## 2024-05-28 RX ORDER — BISACODYL 5 MG/1
TABLET, DELAYED RELEASE ORAL
Qty: 4 TABLET | Refills: 0 | Status: ON HOLD | OUTPATIENT
Start: 2024-05-28 | End: 2024-06-24

## 2024-05-28 NOTE — TELEPHONE ENCOUNTER
Standard Golytely Bowel Prep d/t Hx of DM2 . Instructions were sent via [employer]. Bowel prep was sent 5/28/2024 to    [place] [id number] [place], [place] - [facility] [place] [address]        Joyce Mast RN Colorectal Cancer    Division of Gastroenterology at [facility]

## 2024-06-13 ENCOUNTER — TELEPHONE (OUTPATIENT)
Dept: GASTROENTEROLOGY | Facility: CLINIC | Age: 69
End: 2024-06-13
Payer: COMMERCIAL

## 2024-06-13 NOTE — TELEPHONE ENCOUNTER
"Upon review of chart, patient needs MAC due to Hx tortuous and technically difficult colonoscopy .    Message sent to endoscopy scheduling team to reschedule patient with MAC.     Patient will need an in-person pre op exam within 30 days of procedure if rescheduling at [facility].    Supporting notes from 11/18/14 colonoscopy report:    \"The colonoscopy was technically difficult and complex due to previous surgery, a tortuous colon and the patient's body habitus. Successful completion of the procedure was aided by changing the patient to a supine position, straightening and shortening the scope to obtain bowel loop reduction and using scope torsion.\" MD Salome Funes RN    --------------------------------------------------------------------------------------------------------------------    "

## 2024-06-13 NOTE — LETTER
June 14, 2024      [facility]  [address]   [facility] [address]              Dear Teresa,    Colonoscopy      Procedure date: 6/24/24    Anticipated arrival time: 12:00 PM   (Please note that arrival times may change)    Facility location: [facility]; [address] [place] [address] - Check in location: 1st Floor Skyway lobby. Parking information: Self pay parking available in [facility]. The [facility] Ramp is  located across [facility][1] from the hospital and is connected to the  hospital by a skyway. The skyway access is on Level C of the parking ramp.   parking is available Monday through Friday from 7 a.m.-3 p.m.  parking attendants are stationed at Door [facility]  located off of [address]      Important Procedure Reminders:     Prep Instructions:   Instructions on how to prepare for your upcoming procedure are found below. Please read instructions carefully. Deviation from instructions may result in less than desired outcomes and procedure may need to be rescheduled.   If you have additional questions regarding how to prepare for your upcoming procedure, contact our endoscopy pre assessment nurses at [phone] option 4 Monday through Friday 7:00am-5:00pm         Policy:   The medications used during the procedure will make you sleepy, so you won't be able to drive. On the day of your procedure, please have an adult ready to drive you home and stay with you for the next 24 hours.   You can't use public transportation, ride-share services, or non-medical taxi services without a responsible caregiver. Medical transport services are okay, but a caregiver must be there to receive you at your destination.   Make sure your  and caregiver are confirmed before your procedure.    Day of procedure:  Please keep in mind that arrival times may change. Let your  know there might be a one-hour window for changes.  We ask that  you please check in at the  with your . Your  should remain on campus.  Expect to be at the procedure center for about 1.5-2.5 hours.    Please do not wear jewelry (i.e. earrings, rings, necklaces, watches, etc). Leave your purse, billfold, credit cards, and other valuables at home.   Bring insurance card and ID.     To cancel or reschedule your procedure:   Within 14 days of your procedure if you develop any flu-like symptoms (such as fever, cough, shortness of breath), COVID-19 like symptoms or exposure to COVID-19, contact our endoscopy team at [phone] option 4 to determine if procedure can be completed or needs to be delayed.   If you need to cancel or reschedule, our endoscopy scheduling team can be reached at [phone], option 2. Monday through Friday, 7:00am-5:00pm.      Medication Reminders:    Please note the following medication holding recommendations:   N/A    ----------------------------------------------------------------------------------------------------------------------------------------------------------------------------------------------------------------------------------------------------------------------        A Pre-Operative Physical will be required for your upcoming Endoscopy procedure.     Please be sure to schedule a Pre-Operative physical with your primary care provider within 30 days of your procedure date.      An in-person clinic appointment can also be scheduled with our Pre Anesthesia Clinic (PAC) at [facility].      Virtual PAC appointments will not be accepted.     To schedule an in person PAC visit, please call the Pre-Assessment Clinic at [phone].          STANDARD Golytely (Colyte, Nulytely)  Prep Instructions for your Colonoscopy  Pre-Assessment Phone Number: [facility]; [phone] option 4      Please read these instructions carefully at least 7 days prior to your colonoscopy procedure. Be sure  to follow all directions completely. The inside of your colon must be clean to allow for a complete examination for the presence of any growths, polyps, and/or abnormalities, as well as their biopsy or removal. A number of tips are included in order to make this part of the procedure as comfortable as possible.    Getting ready:   A nurse will call you to go over instructions and your health history.  It's important to complete the nurse assessment before your procedure. If you don't, your procedure might have to be canceled.  You must arrange for an adult to drive you home after your exam. Your colonoscopy cannot be done unless you have a ride. If you need to use public transportation, someone must ride with you and stay with you for a minimum of 6-24 hours.  Check with your insurance company to be sure they will cover this exam.  If you have diabetes:  Ask to have your exam early in the morning.  Also, ask your doctor if you should change your diet or medicines.    7 days before the exam:  Talk to your prescribing provider: If you take blood thinners (such as Coumadin, Plavix, Xarelto, Eliquis, Lovenox, or others), these medications may need to be stopped temporarily before your procedure. Your prescribing provider will tell you what to do.   Talk to your prescribing provider: If you take prescription NSAIDS (such as Sulindac, Celebrex, Mobic, Relafen). Your prescribing provider will tell you what to do.   Stop taking fiber supplements (bran, Metamucil, Fibercon), multi-vitamins with iron, and medicines that contain iron.  Continue taking prescribed aspirin; talk to your prescribing doctor with any concerns.  Stop eating corn, popcorn, nuts and foods that contain seeds. These can stay in the colon for many days and they can clog up the colonoscope.     3 days before the exam:  Begin a low-fiber diet: No raw fruits or vegetables, whole wheat, seeds, nuts, popcorn or other high-fiber foods (see list below). No  Olestra (a fat substitute).    One day before the exam:  You can have a light, low-fiber breakfast. But drink only clear liquids after 9 a.m. (see list below). Drink at least 8 to 10 full glasses of clear liquids during the day.   Stop taking NSAID pain relievers, such as Advil, Ibuprofen, Motrin, etc.  You may take Tylenol.  Fill the jug that contains the Golytely powder with warm water. Cover and shake until well mixed. Use a full gallon of water. Chill for 3 hours, but do not add ice.  Note: You will start drinking half of the Golytely solution at 6 p.m. The timing of drinking the 2nd half of the Golytely solution depends on your appointment arrival time. See Steps 1-2 below.              Step One:  At 3 p.m., take 2 tablets of Dulcolax (bisacodyl).  At 6 p.m. start drinking the Golytely solution. Drink an 8-ounce glass every 15 minutes until the jug is half empty. Drink each glass quickly.   After you start drinking the solution, stay near a toilet. You may have watery stools (diarrhea), mild cramping, bloating , and nausea.   You may want to use Vaseline on the skin around your anus after each bowel movement to prevent irritation. You can also use wet wipes to prevent irritation.    Step Two:      At 6 AM on the day of the exam:  Take 2 tablets of Dulcolax (Bisacodyl).   Drink the other half of the Golytely jug.   Drink one 8-ounce glass every 15 minutes until the jug is empty.   Drink each glass quickly.   You should finish the prep at least 4 hours before the exam.      Day of exam:    You may drink clear liquids only up until 2 hours before your arrival time.  You may take your necessary morning medications with sips of water  Do not take diabetes medicine by mouth until after your exam.  Do not smoke or swallow anything, including water or gum for at least 2 hours before your arrival time. This is a safety issue. Your procedure could be cancelled if you do not follow directions.  No chewing tobacco 6 hours  prior to procedure arrival time.   Please do not wear jewelry (i.e. earrings, rings, necklaces, watches, etc) . Leave your purse, billfold, credit cards, and other valuables at home.   Please arrive with a responsible adult who can take you home after the test and stay with you for a minimum of 24 hours: The medicine used will make you sleepy and forgetful. If you do not have someone to take you home, we will cancel your procedure. If using public transportation you must have someone to ride with you.  Please perform your nebulizer treatments and airway clearance therapy in the morning prior to the procedure (if applicable).  If you have asthma, bring your inhalers.                                  CLEAR LIQUID DIET   You may have:  Water, tea, coffee (no milk or cream)  Soda pop, Gatorade (not red or purple)  Jell-O, Popsicles (no milk or fruit pieces - not red or purple)  Fat-free soup broth or bouillon  Plain hard candy, such as clear life savers (not red or purple)  Clear juices and fruit-flavored drinks, such as apple juice, white grape juice, Hi-C, and Gary-Aid (not red or purple)   Do not have:  Milk or milk products such as ice cream, malts or shakes, or coffee creamer  Red or purple drinks of any kind such as cranberry juice or grape juice. Avoid red or purple Jell-O, Popsicles, Gary-Aid, sorbet, sherbet and candy  Juices with pulp such as orange, grapefruit, pineapple or tomato juice  Cream soups of any kind  Alcohol and beer  Protein drinks or protein powder     LOW FIBER DIET   You may have:    Starches: White bread, rolls, biscuits, croissants, [place] toast, white flour tortillas, waffles, pancakes, Persian toast; white rice, noodles, pasta, macaroni; cooked and peeled potatoes; plain crackers, saltines; cooked farina or cream of rice; puffed rice, corn flakes, Rice Krispies, Special K   Vegetables: tender cooked and canned, vegetable broths  Fruits and fruit juices: Strained fruit juice, canned fruit  without seeds or skin (not pineapple), applesauce, pear sauce, ripe bananas, melons (not watermelon)   Milk products: Milk (plain or flavored), cheese, cottage cheese, yogurt (no berries), custard, ice cream    Proteins: Tender, well-cooked ground beef, lamb, veal, ham, pork, chicken, turkey, fish or organ meats; eggs; creamy peanut butter   Fats and condiments:  Margarine, butter, oils, mayonnaise, sour cream, salad dressing, plain gravy; spices, cooked herbs; sugar, clear jelly, honey, syrup   Snacks, sweets and drinks: Pretzels, hard candy; plain cakes and cookies (no nuts or seeds); gelatin, plain pudding, sherbet, Popsicles; coffee, tea, carbonated ( fizzy ) drinks Do not have:    Starches: Breads or rolls that contain nuts, seeds or fruit; whole wheat or whole grain breads that contain more than 1 gram of fiber per slice; cornbread; corn or whole wheat tortillas; potatoes with skin; brown rice, wild rice, kasha (buckwheat), and oatmeal   Vegetables: Any raw or steamed vegetables; vegetables with seeds; corn in any form   Fruits and fruit juices: Prunes, prune juice, raisins and other dried fruits, berries and other fruits with seeds, canned pineapple juices with pulp such as orange, grapefruit, pineapple or tomato juice  Milk products: Any yogurt with nuts, seeds or berries   Proteins: Tough, fibrous meats with gristle; cooked dried beans, peas or lentils; crunchy peanut butter  Fats and condiments: Pickles, olives, relish, horseradish; jam, marmalade, preserves   Snacks, sweets and drinks: Popcorn, nuts, seeds, granola, coconut, candies made with nuts or seeds; all desserts that contain nuts, seeds, raisins and other dried fruits, coconut, whole grains or bran.                FAQ:    How do you know if your colon is cleaned out?   After completing the bowel prep, your bowel movements should be all liquid and yellow. Your bowel movements will look similar to urine in the toilet. If there are pieces of stool  (poop) in the toilet, or if you can't see to the bottom of the toilet, please call our office for advice. Call [phone] and ask to speak with a nurse.   Why do you need a responsible  to take you home and stay with you?  We require a responsible adult to take you home for your safety. The sedation medicines used to relax you during the procedure can impair your judgement and reaction time, make you forgetful and possible a little unsteady. Do not drive, make any important decisions, or sign any legal documents for 24 hours after your procedure.   It is normal to feel bloated and gassy after your procedure. Walking will help move the air through your colon. You can take non-aspirin pain relievers that contain acetaminophen (Tylenol).   When can you eat after your procedure?  You may resume your normal diet when you feel ready, unless advised otherwise by the doctor performing your procedure. Do not drink alcohol for 24 hours after your procedure.   You many resume normal activities (work, exercise, etc.) after 24 hours.   When will you get test results?  You should have your procedure results and any lab results (if applicable) by letter, MyChart message, or phone call within 2 weeks. If you have any questions, please call the doctor that referred you for the procedure.       Thank you for choosing  [facility] for your procedure. If you are sent a survey regarding your care, please take the time to complete the questionnaire. We value your feedback!             Updated: 6/22/2022

## 2024-06-14 NOTE — TELEPHONE ENCOUNTER
Pre visit planning completed.      Procedure details:    Patient scheduled for Colonoscopy  on 6/24/24.     Arrival time: 1200. Procedure time 1300    Facility location: [facility]; [facility] [place] [address]. Check in location: 1st Floor [facility].     Sedation type: MAC    Pre op exam needed? Yes.  Patient needs to complete a pre-operative exam prior to procedure.  Informed patient pre-op is needed via [employer] message    Indication for procedure: Screening      Chart review:     Electronic implanted devices? No    Recent diagnosis of diverticulitis within the last 6 weeks? No    Diabetic? Yes. Not on diabetic medication      Medication review:    Anticoagulants? No    NSAIDS? No NSAID medications per patient's medication list.  RN will verify with pre-assessment call.    Other medication HOLDING recommendations:  N/A      Prep for procedure:     Bowel prep recommendation: Standard Golytely. Bowel prep prescription sent by [facility] nursing team on 5/28/24 to [facility] [id number] [place] [id number]  Due to: diabetes.     Prep instructions sent via [employer] by [facility] nursing team on 5/28/24.        Nicci Sterling RN  Endoscopy Procedure Pre Assessment RN  [phone] option 4

## 2024-06-14 NOTE — TELEPHONE ENCOUNTER
Pre assessment completed for upcoming procedure.   (Please see previous telephone encounter notes for complete details)    Procedure details:    Arrival time and facility location reviewed.    Pre op exam needed? Yes.  Patient needs to complete a pre-operative exam prior to procedure.  Informed patient pre-op is needed via [employer] message and verbal reminder, and letter per pt request    Designated  policy reviewed. Instructed to have someone stay 24  hours post procedure.       Medication review:    Medications reviewed. Please see supporting documentation below. Holding recommendations discussed (if applicable).       Prep for procedure:      Patient declined to review procedure prep instructions on the phone. Instructed patient to review the procedure prep instructions at least 7 days prior to procedure due to necessary dietary modifications. NPO instructions reviewed.    Pt stated her daughter is the one that get get into [employer], so a letter with the prep and apt details was requested.     Patient was instructed to call if any questions or concerns arise.      Any additional information needed:  N/A      Patient  verbalized understanding and had no questions or concerns at this time.      Salome Worrell RN  [phone] option 4

## 2024-06-17 ENCOUNTER — OFFICE VISIT (OUTPATIENT)
Dept: FAMILY MEDICINE | Facility: CLINIC | Age: 69
End: 2024-06-17
Payer: COMMERCIAL

## 2024-06-17 VITALS
HEIGHT: 56 IN | SYSTOLIC BLOOD PRESSURE: 142 MMHG | RESPIRATION RATE: 16 BRPM | OXYGEN SATURATION: 96 % | BODY MASS INDEX: 34.64 KG/M2 | WEIGHT: 154 LBS | TEMPERATURE: 97.9 F | DIASTOLIC BLOOD PRESSURE: 86 MMHG | HEART RATE: 70 BPM

## 2024-06-17 DIAGNOSIS — Z12.11 ENCOUNTER FOR COLORECTAL CANCER SCREENING: ICD-10-CM

## 2024-06-17 DIAGNOSIS — Z12.12 ENCOUNTER FOR COLORECTAL CANCER SCREENING: ICD-10-CM

## 2024-06-17 DIAGNOSIS — E11.9 TYPE 2 DIABETES MELLITUS WITHOUT COMPLICATION, WITHOUT LONG-TERM CURRENT USE OF INSULIN (H): ICD-10-CM

## 2024-06-17 DIAGNOSIS — Z01.818 PREOP EXAM FOR INTERNAL MEDICINE: Primary | ICD-10-CM

## 2024-06-17 LAB — HBA1C MFR BLD: 6.7 % (ref 0–5.6)

## 2024-06-17 PROCEDURE — 83036 HEMOGLOBIN GLYCOSYLATED A1C: CPT | Performed by: INTERNAL MEDICINE

## 2024-06-17 PROCEDURE — 36415 COLL VENOUS BLD VENIPUNCTURE: CPT | Performed by: INTERNAL MEDICINE

## 2024-06-17 PROCEDURE — 99214 OFFICE O/P EST MOD 30 MIN: CPT | Performed by: INTERNAL MEDICINE

## 2024-06-17 ASSESSMENT — PAIN SCALES - GENERAL: PAINLEVEL: MODERATE PAIN (5)

## 2024-06-17 NOTE — PROGRESS NOTES
Preoperative Evaluation  [facility]  [address] [facility], [address]  [facility] [address]  Phone: [phone]  Primary Provider: Yoni Baxter MD  Pre-op Performing Provider: Jensen Easton MD  Jun 17, 2024 6/17/2024   Surgical Information   What procedure is being done? colonoscopy   Facility or Hospital where procedure/surgery will be performed: [facility]   Who is doing the procedure / surgery? tbd   Date of surgery / procedure: 6-   Time of surgery / procedure: 12:00pm   Where do you plan to recover after surgery? at home with family     Fax number for surgical facility: Note does not need to be faxed, will be available electronically in Epic.    Assessment & Plan     The proposed surgical procedure is considered LOW risk.    Preop exam for internal medicine  Acceptable risk. Proceed as planned.      Encounter for colorectal cancer screening  As above    Type 2 diabetes mellitus without complication, without long-term current use of insulin (H)  Hesitant to start any medication. Last visit and lab reviewed.  Check A1c.  Has PCP followup in September.    - Hemoglobin A1c          - No identified additional risk factors other than previously addressed         Recommendation  Approval given to proceed with proposed procedure, without further diagnostic evaluation.        Merrill Moore is a 69 year old, presenting for the following:  Pre-Op Exam        Via the Health Maintenance questionnaire, the patient has reported the following services have been completed -Eye Exam: [facility] 2024-05-13, this information has been sent to the abstraction team.  HPI related to upcoming procedure:    Pt is new to me but not the clinic.  Planning elective colonoscopy  For unclear reasons she believes she will be sedated.      She does have HTN (usually well controlled on meds), Diabetes (not on medication), Obesity and non obstructive CAD (angio 2021).             6/17/2024   Pre-Op Questionnaire   Have you ever had a heart attack or stroke? No   Have you ever had surgery on your heart or blood vessels, such as a stent placement, a coronary artery bypass, or surgery on an artery in your head, neck, heart, or legs? No   Do you have chest pain with activity? No   Do you have a history of heart failure? No   Do you currently have a cold, bronchitis or symptoms of other infection? No   Do you have a cough, shortness of breath, or wheezing? No   Do you or anyone in your family have previous history of blood clots? (!) YES (father)   Do you or does anyone in your family have a serious bleeding problem such as prolonged bleeding following surgeries or cuts? No   Have you ever had problems with anemia or been told to take iron pills? No   Have you had any abnormal blood loss such as black, tarry or bloody stools, or abnormal vaginal bleeding? No   Have you ever had a blood transfusion? No   Are you willing to have a blood transfusion if it is medically needed before, during, or after your surgery? Yes   Have you or any of your relatives ever had problems with anesthesia? No   Do you have sleep apnea, excessive snoring or daytime drowsiness? No   Do you have any artifical heart valves or other implanted medical devices like a pacemaker, defibrillator, or continuous glucose monitor? No   Do you have artificial joints? No   Are you allergic to latex? No       Preoperative Review of    reviewed - no record of controlled substances prescribed.          Patient Active Problem List    Diagnosis Date Noted    Class 2 severe obesity due to excess calories with serious comorbidity in adult (H) 03/12/2024     Priority: Medium    ASCVD (arteriosclerotic cardiovascular disease) 06/2021     Priority: Medium     on angio after pos est for collazo.  Non obstructive cad, med mgmt      Arthritis of knee, degenerative      Priority: Medium     seen by Dr. Jones 2017 and cortisone did not help       Type 2 diabetes mellitus without complication, without long-term current use of insulin (H) 11/28/2016     Priority: Medium    Gastroesophageal reflux disease without esophagitis 11/28/2016     Priority: Medium    Hyperlipidemia LDL goal <130 07/11/2014     Priority: Medium    Benign essential hypertension      Priority: Medium    Vitamin D deficiency      Priority: Medium      Past Medical History:   Diagnosis Date    Arthritis of knee, degenerative     seen by Dr. Jones 2017 and cortisone did not help    ASCVD (arteriosclerotic cardiovascular disease) 06/2021    on angio after pos est for collazo.  Non obstructive cad, med mgmt    Chest pain 04/2015    nl est echo    Dizziness years    GERD (gastroesophageal reflux disease)     H/O colonoscopy 2014    tics, tortuous, fu 5 years    HTN (hypertension) 1988    Hypercholesteraemia 2010    Screening for osteoporosis 10/2020    nl dexa    Type II or unspecified type diabetes mellitus without mention of complication, not stated as uncontrolled     not on meds as of 2018    Vitamin D deficiency      Past Surgical History:   Procedure Laterality Date    CATARACT EXTRACTION  05/2023    COLONOSCOPY N/A 11/18/2014    Procedure: COLONOSCOPY;  Surgeon: Yoni Carvajal MD;  Location:  GI    CV HEART CATHETERIZATION WITH POSSIBLE INTERVENTION N/A 06/17/2021    Procedure: Coronary Angiogram;  Surgeon: Renata Herring MD;  Location:  HEART CARDIAC CATH LAB    CV LEFT HEART CATH N/A 06/17/2021    Procedure: Left Heart Cath;  Surgeon: Renata Herring MD;  Location:  HEART CARDIAC CATH LAB    fibroid surgery  01/01/2000    HYSTERECTOMY, PAP NO LONGER INDICATED  01/01/2006    estrada/bso due to pain and bleeding    LAPAROSCOPIC APPENDECTOMY  05/19/2014    Procedure: LAPAROSCOPIC APPENDECTOMY;  Surgeon: Will Barth MD;  Location: [facility]     Current Outpatient Medications   Medication Sig Dispense Refill    aspirin 81 MG EC tablet Take 81 mg by mouth as needed for moderate  "pain      bisacodyl (DULCOLAX) 5 MG EC tablet Take 2 tablets at 3 pm the day before your procedure. If your procedure is before 11 am, take 2 additional tablets at 11 pm. If your procedure is after 11 am, take 2 additional tablets at 6 am. For additional instructions refer to your colonoscopy prep instructions. 4 tablet 0    cholecalciferol (VITAMIN D3) 25 mcg (1000 units) capsule Take 1 capsule by mouth daily      hydroCHLOROthiazide 12.5 MG tablet Take 1 tablet (12.5 mg) by mouth daily 90 tablet 3    irbesartan (AVAPRO) 300 MG tablet Take 1 tablet (300 mg) by mouth at bedtime 90 tablet 3    metoprolol succinate ER (TOPROL XL) 25 MG 24 hr tablet Take 2 tablets (50 mg) by mouth daily 180 tablet 2    nitroGLYcerin (NITROSTAT) 0.4 MG sublingual tablet For chest pain place 1 tablet under the tongue every 5 minutes for 3 doses. If symptoms persist 5 minutes after 1st dose call 911. 30 tablet 11    simvastatin (ZOCOR) 20 MG tablet Take 1 tablet (20 mg) by mouth at bedtime 90 tablet 3       No Known Allergies     Social History     Tobacco Use    Smoking status: Never    Smokeless tobacco: Never   Substance Use Topics    Alcohol use: No     Alcohol/week: 0.0 standard drinks of alcohol       History   Drug Use No             Review of Systems  Constitutional, neuro, ENT, endocrine, pulmonary, cardiac, gastrointestinal, genitourinary, musculoskeletal, integument and psychiatric systems are negative, except as otherwise noted.    Objective    BP (!) 142/86 (BP Location: Left arm)   Pulse 70   Temp 97.9  F (36.6  C) (Tympanic)   Resp 16   Ht 1.422 m (4' 8\")   Wt 69.9 kg (154 lb)   SpO2 96%   BMI 34.53 kg/m     Estimated body mass index is 34.53 kg/m  as calculated from the following:    Height as of this encounter: 1.422 m (4' 8\").    Weight as of this encounter: 69.9 kg (154 lb).  Wt Readings from Last 3 Encounters:   06/17/24 69.9 kg (154 lb)   03/12/24 71.2 kg (157 lb)   09/12/23 68.5 kg (151 lb)       Physical " Exam  GENERAL: alert and no distress  NECK: no adenopathy, no asymmetry, masses, or scars  RESP: lungs clear to auscultation - no rales, rhonchi or wheezes  CV: regular rate and rhythm, normal S1 S2, no S3 or S4, no murmur, click or rub, no peripheral edema  ABDOMEN: soft, nontender, no hepatosplenomegaly, no masses and bowel sounds normal  MS: no gross musculoskeletal defects noted, no edema    Recent Labs   Lab Test 03/12/24  0830 09/12/23  0756   HGB 11.4*  --      --      --    POTASSIUM 4.7  --    CR 0.59  --    A1C 7.2* 6.8*        Diagnostics  Labs pending at this time.  Results will be reviewed when available.   No EKG required for low risk surgery (cataract, skin procedure, breast biopsy, etc).    Revised Cardiac Risk Index (RCRI)  The patient has the following serious cardiovascular risks for perioperative complications:   - Coronary Artery Disease (MI, positive stress test, angina, Qs on EKG) = 1 point     RCRI Interpretation: 1 point: Class II (low risk - 0.9% complication rate)         Signed Electronically by: Jensen Easton MD  Copy of this evaluation report is provided to requesting physician.

## 2024-06-17 NOTE — PROGRESS NOTES
Preoperative Evaluation  [address], [address]  [facility] [address]  Phone: [phone]  Primary Provider: Yoni Baxter MD  Pre-op Performing Provider: Jensen Easton MD  Jun 17, 2024   {Provider  Link to PREOP SmartSet  REQUIRED to apply standard patient instructions and medication directions to the AVS :668530}  {ROOMER review and update patient entered surgical information if needed :159545}  { After Pre-op is completed, use lists to pull documentation into note Link to complete Pre-Op    :717370}  {Pull Surgical Information into note after flowsheet completed:163689}  Fax number for surgical facility: {:454923}    {Provider Charting Preference for Preop :960691}    Merrill Moore is a 69 year old, presenting for the following:  Pre-Op Exam      {(!) Visit Details have not yet been documented.  Please enter Visit Details and then use this list to pull in documentation. (Optional):684272}  HPI related to upcoming procedure: ***         No data to display              Health Care Directive  Patient does not have a Health Care Directive or Living Will: Discussed advance care planning with patient; information given to patient to review.    Preoperative Review of   {Mnpmpreport:974652}  {Review MNPMP for all patients per [facility]CSI MNPMP Profile:253638}    {Chronic problem details (Optional) :768704}    Patient Active Problem List    Diagnosis Date Noted    Class 2 severe obesity due to excess calories with serious comorbidity in adult (H) 03/12/2024     Priority: Medium    ASCVD (arteriosclerotic cardiovascular disease) 06/2021     Priority: Medium     on angio after pos est for collazo.  Non obstructive cad, med mgmt      Arthritis of knee, degenerative      Priority: Medium     seen by Dr. Jones 2017 and cortisone did not help      Type 2 diabetes mellitus without complication, without long-term current use of insulin (H) 11/28/2016     Priority: Medium     Gastroesophageal reflux disease without esophagitis 11/28/2016     Priority: Medium    Hyperlipidemia LDL goal <130 07/11/2014     Priority: Medium    Benign essential hypertension      Priority: Medium    Vitamin D deficiency      Priority: Medium      Past Medical History:   Diagnosis Date    Arthritis of knee, degenerative     seen by Dr. Jones 2017 and cortisone did not help    ASCVD (arteriosclerotic cardiovascular disease) 06/2021    on angio after pos est for collazo.  Non obstructive cad, med mgmt    Chest pain 04/2015    nl est echo    Dizziness years    GERD (gastroesophageal reflux disease)     H/O colonoscopy 2014    tics, tortuous, fu 5 years    HTN (hypertension) 1988    Hypercholesteraemia 2010    Screening for osteoporosis 10/2020    nl dexa    Type II or unspecified type diabetes mellitus without mention of complication, not stated as uncontrolled     not on meds as of 2018    Vitamin D deficiency      Past Surgical History:   Procedure Laterality Date    CATARACT EXTRACTION  05/2023    COLONOSCOPY N/A 11/18/2014    Procedure: COLONOSCOPY;  Surgeon: Yoni Carvajal MD;  Location:  GI    CV HEART CATHETERIZATION WITH POSSIBLE INTERVENTION N/A 06/17/2021    Procedure: Coronary Angiogram;  Surgeon: Renata Herring MD;  Location:  HEART CARDIAC CATH LAB    CV LEFT HEART CATH N/A 06/17/2021    Procedure: Left Heart Cath;  Surgeon: Renata Herring MD;  Location:  HEART CARDIAC CATH LAB    fibroid surgery  01/01/2000    HYSTERECTOMY, PAP NO LONGER INDICATED  01/01/2006    estrada/bso due to pain and bleeding    LAPAROSCOPIC APPENDECTOMY  05/19/2014    Procedure: LAPAROSCOPIC APPENDECTOMY;  Surgeon: Will Barth MD;  Location: [facility]     Current Outpatient Medications   Medication Sig Dispense Refill    aspirin 81 MG EC tablet Take 81 mg by mouth as needed for moderate pain      bisacodyl (DULCOLAX) 5 MG EC tablet Take 2 tablets at 3 pm the day before your procedure. If your procedure is before 11  "am, take 2 additional tablets at 11 pm. If your procedure is after 11 am, take 2 additional tablets at 6 am. For additional instructions refer to your colonoscopy prep instructions. 4 tablet 0    cholecalciferol (VITAMIN D3) 25 mcg (1000 units) capsule Take 1 capsule by mouth daily      hydroCHLOROthiazide 12.5 MG tablet Take 1 tablet (12.5 mg) by mouth daily 90 tablet 3    irbesartan (AVAPRO) 300 MG tablet Take 1 tablet (300 mg) by mouth at bedtime 90 tablet 3    metoprolol succinate ER (TOPROL XL) 25 MG 24 hr tablet Take 2 tablets (50 mg) by mouth daily 180 tablet 2    nitroGLYcerin (NITROSTAT) 0.4 MG sublingual tablet For chest pain place 1 tablet under the tongue every 5 minutes for 3 doses. If symptoms persist 5 minutes after 1st dose call 911. 30 tablet 11    simvastatin (ZOCOR) 20 MG tablet Take 1 tablet (20 mg) by mouth at bedtime 90 tablet 3    omega 3 1000 MG CAPS Take 1 g by mouth daily (Patient not taking: Reported on 6/17/2024) 90 capsule     polyethylene glycol (GOLYTELY) 236 g suspension The night before the exam at 6 pm drink an 8-ounce glass every 15 minutes until the jug is half empty. If you arrive before 11 AM: Drink the other half of the Golytely jug at 11 PM night before procedure. If you arrive after 11 AM: Drink the other half of the Golytely jug at 6 AM day of procedure. For additional instructions refer to your colonoscopy prep instructions. (Patient not taking: Reported on 6/17/2024) 4000 mL 0       No Known Allergies     Social History     Tobacco Use    Smoking status: Never    Smokeless tobacco: Never   Substance Use Topics    Alcohol use: No     Alcohol/week: 0.0 standard drinks of alcohol     {FAMILY HISTORY (Optional):766459062}  History   Drug Use No           {ROS Picklists (Optional):104888}    Objective    BP (!) 156/91 (BP Location: Right arm, Patient Position: Sitting, Cuff Size: Adult Large)   Pulse 70   Temp 97.9  F (36.6  C) (Tympanic)   Resp 16   Ht 1.422 m (4' 8\")   " "Wt 69.9 kg (154 lb)   SpO2 96%   BMI 34.53 kg/m     Estimated body mass index is 34.53 kg/m  as calculated from the following:    Height as of this encounter: 1.422 m (4' 8\").    Weight as of this encounter: 69.9 kg (154 lb).  Physical Exam  {Exam List :483107}    Recent Labs   Lab Test 24  0830 23  0756   HGB 11.4*  --      --      --    POTASSIUM 4.7  --    CR 0.59  --    A1C 7.2* 6.8*        Diagnostics  {LABS:342689}   {EK}    Revised Cardiac Risk Index (RCRI)  The patient has the following serious cardiovascular risks for perioperative complications:  {PREOP REVISED CARDIAC RISK INDEX (RCRI) :153232}     RCRI Interpretation: {REVISED CARDIAC RISK INTERPRETATION :970362}         Signed Electronically by: Jensen Easton MD  Copy of this evaluation report is provided to requesting physician.    {Provider Resources  Preop [facility] Preop Guidelines  Revised Cardiac Risk Index :779560}   {Email feedback regarding this note to [email or web address]   :594803}  "

## 2024-06-17 NOTE — Clinical Note
Please abstract the following data from this visit with this patient into the appropriate field in Epic:  Tests that can be patient reported without a hard copy:  Eye exam with ophthalmology on this date: 05/13/2024 at [facility]

## 2024-06-22 ENCOUNTER — ANESTHESIA EVENT (OUTPATIENT)
Dept: GASTROENTEROLOGY | Facility: CLINIC | Age: 69
End: 2024-06-22
Payer: COMMERCIAL

## 2024-06-24 ENCOUNTER — ANESTHESIA (OUTPATIENT)
Dept: GASTROENTEROLOGY | Facility: CLINIC | Age: 69
End: 2024-06-24
Payer: COMMERCIAL

## 2024-06-24 ENCOUNTER — HOSPITAL ENCOUNTER (OUTPATIENT)
Facility: CLINIC | Age: 69
Discharge: HOME | End: 2024-06-24
Attending: COLON & RECTAL SURGERY | Admitting: COLON & RECTAL SURGERY
Payer: COMMERCIAL

## 2024-06-24 VITALS
RESPIRATION RATE: 24 BRPM | SYSTOLIC BLOOD PRESSURE: 132 MMHG | HEART RATE: 76 BPM | OXYGEN SATURATION: 97 % | DIASTOLIC BLOOD PRESSURE: 78 MMHG

## 2024-06-24 LAB — COLONOSCOPY: NORMAL

## 2024-06-24 PROCEDURE — 999N000010 HC STATISTIC ANES STAT CODE-CRNA PER MINUTE: Performed by: COLON & RECTAL SURGERY

## 2024-06-24 PROCEDURE — 258N000003 HC RX IP 258 OP 636: Mod: JZ | Performed by: ANESTHESIOLOGY

## 2024-06-24 PROCEDURE — 370N000017 HC ANESTHESIA TECHNICAL FEE, PER MIN: Performed by: COLON & RECTAL SURGERY

## 2024-06-24 PROCEDURE — 45385 COLONOSCOPY W/LESION REMOVAL: CPT | Performed by: COLON & RECTAL SURGERY

## 2024-06-24 PROCEDURE — 45385 COLONOSCOPY W/LESION REMOVAL: CPT | Performed by: NURSE ANESTHETIST, CERTIFIED REGISTERED

## 2024-06-24 PROCEDURE — 45385 COLONOSCOPY W/LESION REMOVAL: CPT | Performed by: ANESTHESIOLOGY

## 2024-06-24 PROCEDURE — 250N000011 HC RX IP 250 OP 636: Performed by: ANESTHESIOLOGY

## 2024-06-24 PROCEDURE — 88305 TISSUE EXAM BY PATHOLOGIST: CPT | Mod: TC | Performed by: COLON & RECTAL SURGERY

## 2024-06-24 RX ORDER — ONDANSETRON 2 MG/ML
INJECTION INTRAMUSCULAR; INTRAVENOUS PRN
Status: DISCONTINUED | OUTPATIENT
Start: 2024-06-24 | End: 2024-06-24

## 2024-06-24 RX ORDER — PROPOFOL 10 MG/ML
INJECTION, EMULSION INTRAVENOUS CONTINUOUS PRN
Status: DISCONTINUED | OUTPATIENT
Start: 2024-06-24 | End: 2024-06-24

## 2024-06-24 RX ORDER — SODIUM CHLORIDE, SODIUM LACTATE, POTASSIUM CHLORIDE, CALCIUM CHLORIDE 600; 310; 30; 20 MG/100ML; MG/100ML; MG/100ML; MG/100ML
INJECTION, SOLUTION INTRAVENOUS CONTINUOUS PRN
Status: DISCONTINUED | OUTPATIENT
Start: 2024-06-24 | End: 2024-06-24

## 2024-06-24 RX ORDER — ONDANSETRON 2 MG/ML
4 INJECTION INTRAMUSCULAR; INTRAVENOUS
Status: DISCONTINUED | OUTPATIENT
Start: 2024-06-24 | End: 2024-06-24 | Stop reason: HOSPADM

## 2024-06-24 RX ORDER — LIDOCAINE 40 MG/G
CREAM TOPICAL
Status: DISCONTINUED | OUTPATIENT
Start: 2024-06-24 | End: 2024-06-24 | Stop reason: HOSPADM

## 2024-06-24 RX ADMIN — PROPOFOL 200 MCG/KG/MIN: 10 INJECTION, EMULSION INTRAVENOUS at 12:25

## 2024-06-24 RX ADMIN — SODIUM CHLORIDE, POTASSIUM CHLORIDE, SODIUM LACTATE AND CALCIUM CHLORIDE: 600; 310; 30; 20 INJECTION, SOLUTION INTRAVENOUS at 12:22

## 2024-06-24 RX ADMIN — ONDANSETRON 4 MG: 2 INJECTION INTRAMUSCULAR; INTRAVENOUS at 12:25

## 2024-06-24 ASSESSMENT — ACTIVITIES OF DAILY LIVING (ADL)
ADLS_ACUITY_SCORE: 37

## 2024-06-24 ASSESSMENT — LIFESTYLE VARIABLES: TOBACCO_USE: 0

## 2024-06-24 NOTE — ANESTHESIA CARE TRANSFER NOTE
Patient: Teresa Herron    Procedure: Procedure(s):  Colonoscopy       Diagnosis: Special screening for malignant neoplasms, colon [Z12.11]  Diagnosis Additional Information: No value filed.    Anesthesia Type:   MAC     Note:    Oropharynx: oropharynx clear of all foreign objects  Level of Consciousness: awake  Oxygen Supplementation: face mask    Independent Airway: airway patency satisfactory and stable  Dentition: dentition unchanged  Vital Signs Stable: post-procedure vital signs reviewed and stable  Report to RN Given: handoff report given  Patient transferred to: PACU    Handoff Report: Identifed the Patient, Identified the Reponsible Provider, Reviewed the pertinent medical history, Discussed the surgical course, Reviewed Intra-OP anesthesia mangement and issues during anesthesia, Set expectations for post-procedure period and Allowed opportunity for questions and acknowledgement of understanding        Electronically Signed By: NENITA Downey CRNA  June 24, 2024  12:51 PM

## 2024-06-24 NOTE — ANESTHESIA POSTPROCEDURE EVALUATION
Patient: Teresa Herron    Procedure: Procedure(s):  Colonoscopy       Anesthesia Type:  MAC    Note:  Disposition: Outpatient   Postop Pain Control: Uneventful            Sign Out: Well controlled pain   PONV: No   Neuro/Psych: Uneventful            Sign Out: Acceptable/Baseline neuro status   Airway/Respiratory: Uneventful            Sign Out: Acceptable/Baseline resp. status   CV/Hemodynamics: Uneventful            Sign Out: Acceptable CV status; No obvious hypovolemia; No obvious fluid overload   Other NRE: NONE   DID A NON-ROUTINE EVENT OCCUR? No           Last vitals:  Vitals Value Taken Time   /83 06/24/24 1310   Temp     Pulse 65 06/24/24 1314   Resp 17 06/24/24 1314   SpO2 98 % 06/24/24 1314   Vitals shown include unfiled device data.    Electronically Signed By: Emilee Tate MD  June 24, 2024  2:18 PM

## 2024-06-24 NOTE — ANESTHESIA PREPROCEDURE EVALUATION
Anesthesia Pre-Procedure Evaluation    Patient: Teresa Herron   MRN: [id number] : 1955        Procedure : Procedure(s):  Colonoscopy          Past Medical History:   Diagnosis Date    Arthritis of knee, degenerative     seen by Dr. Jones  and cortisone did not help    ASCVD (arteriosclerotic cardiovascular disease) 2021    on angio after pos est for collazo.  Non obstructive cad, med mgmt    Chest pain 2015    nl est echo    Dizziness years    GERD (gastroesophageal reflux disease)     H/O colonoscopy     tics, tortuous, fu 5 years    HTN (hypertension)     Hypercholesteraemia     Screening for osteoporosis 10/2020    nl dexa    Type II or unspecified type diabetes mellitus without mention of complication, not stated as uncontrolled     not on meds as of     Vitamin D deficiency       Past Surgical History:   Procedure Laterality Date    CATARACT EXTRACTION  2023    COLONOSCOPY N/A 2014    Procedure: COLONOSCOPY;  Surgeon: Yoni Carvajal MD;  Location:  GI    CV HEART CATHETERIZATION WITH POSSIBLE INTERVENTION N/A 2021    Procedure: Coronary Angiogram;  Surgeon: Renata Herring MD;  Location:  HEART CARDIAC CATH LAB    CV LEFT HEART CATH N/A 2021    Procedure: Left Heart Cath;  Surgeon: Renata Herring MD;  Location:  HEART CARDIAC CATH LAB    fibroid surgery  2000    HYSTERECTOMY, PAP NO LONGER INDICATED  2006    estrada/bso due to pain and bleeding    LAPAROSCOPIC APPENDECTOMY  2014    Procedure: LAPAROSCOPIC APPENDECTOMY;  Surgeon: Will Barth MD;  Location: [facility]      No Known Allergies   Social History     Tobacco Use    Smoking status: Never    Smokeless tobacco: Never   Substance Use Topics    Alcohol use: No     Alcohol/week: 0.0 standard drinks of alcohol      Wt Readings from Last 1 Encounters:   24 69.9 kg (154 lb)        Anesthesia Evaluation            ROS/MED HX  ENT/Pulmonary:    (-) tobacco use, asthma and  sleep apnea   Neurologic:    (-) no CVA and no TIA   Cardiovascular:     (+) Dyslipidemia hypertension- -   -  - -                                 Previous cardiac testing   Echo: Date: Results:    Stress Test:  Date: 2021 Results:  Normal rest wall motion with stress-induced wall motion abnormalities  consistent with ischemia in the apical and inferior wall(s).  Consider coronary angiogram for further assessment.  Findings discussed with the nurse of Dr. Baxter at 4.30 pm.    ECG Reviewed:  Date: Results:    Cath:  Date: Results:   (-) pacemaker, stent, pacemaker and ICD   METS/Exercise Tolerance: 3 - Able to walk 1-2 blocks without stopping    Hematologic:       Musculoskeletal:       GI/Hepatic:     (+) GERD, Asymptomatic on medication,                  Renal/Genitourinary:    (-) renal disease   Endo:     (+)  type II DM,             Obesity,       Psychiatric/Substance Use:       Infectious Disease:       Malignancy:       Other:            Physical Exam    Airway        Mallampati: II   TM distance: > 3 FB   Neck ROM: full   Mouth opening: > 3 cm    Respiratory Devices and Support         Dental       (+) Multiple crowns, permanant bridges      Cardiovascular   cardiovascular exam normal          Pulmonary   pulmonary exam normal                OUTSIDE LABS:  CBC:   Lab Results   Component Value Date    WBC 6.3 03/12/2024    WBC 7.2 03/03/2023    HGB 11.4 (L) 03/12/2024    HGB 12.7 03/03/2023    HCT 37.3 03/12/2024    HCT 42.3 03/03/2023     03/12/2024     03/03/2023     BMP:   Lab Results   Component Value Date     03/12/2024     03/03/2023    POTASSIUM 4.7 03/12/2024    POTASSIUM 4.9 03/03/2023    CHLORIDE 106 03/12/2024    CHLORIDE 103 03/03/2023    CO2 27 03/12/2024    CO2 27 03/03/2023    BUN 12.9 03/12/2024    BUN 12.9 03/03/2023    CR 0.59 03/12/2024    CR 0.59 03/03/2023     (H) 03/12/2024     (H) 03/03/2023     COAGS:   Lab Results   Component Value Date    PTT  "31 06/17/2021    INR 1.02 06/17/2021     POC: No results found for: \"BGM\", \"HCG\", \"HCGS\"  HEPATIC:   Lab Results   Component Value Date    ALBUMIN 3.5 05/03/2022    PROTTOTAL 7.6 05/03/2022    ALT 19 05/03/2022    AST 14 05/03/2022    ALKPHOS 88 05/03/2022    BILITOTAL 0.6 05/03/2022     OTHER:   Lab Results   Component Value Date    A1C 6.7 (H) 06/17/2024    AMISH 9.2 03/12/2024    PHOS 2.8 02/09/2015    LIPASE 53 05/17/2014    TSH 1.12 03/07/2019    CRP 14.1 (H) 02/09/2015       Anesthesia Plan    ASA Status:  3    NPO Status:  NPO Appropriate    Anesthesia Type: MAC.     - Reason for MAC: chronic cardiopulmonary disease              Consents    Anesthesia Plan(s) and associated risks, benefits, and realistic alternatives discussed. Questions answered and patient/representative(s) expressed understanding.     - Discussed:     - Discussed with:  Patient            Postoperative Care       PONV prophylaxis: Background Propofol Infusion     Comments:               Man Wallace MD    I have reviewed the pertinent notes and labs in the chart from the past 30 days and (re)examined the patient.  Any updates or changes from those notes are reflected in this note.             # Drug Induced Platelet Defect: home medication list includes an antiplatelet medication  # DMII: A1C = 6.7 % (Ref range: 0.0 - 5.6 %) within past 6 months  # Obesity: Estimated body mass index is 34.53 kg/m  as calculated from the following:    Height as of 6/17/24: 1.422 m (4' 8\").    Weight as of 6/17/24: 69.9 kg (154 lb).      "

## 2024-06-25 LAB
PATH REPORT.COMMENTS IMP SPEC: NORMAL
PATH REPORT.COMMENTS IMP SPEC: NORMAL
PATH REPORT.FINAL DX SPEC: NORMAL
PATH REPORT.GROSS SPEC: NORMAL
PATH REPORT.MICROSCOPIC SPEC OTHER STN: NORMAL
PATH REPORT.RELEVANT HX SPEC: NORMAL
PHOTO IMAGE: NORMAL

## 2024-06-25 PROCEDURE — 88305 TISSUE EXAM BY PATHOLOGIST: CPT | Mod: 26 | Performed by: PATHOLOGY

## 2024-07-08 ENCOUNTER — PATIENT OUTREACH (OUTPATIENT)
Dept: GASTROENTEROLOGY | Facility: CLINIC | Age: 69
End: 2024-07-08
Payer: COMMERCIAL

## 2024-07-14 ENCOUNTER — TRANSFERRED RECORDS (OUTPATIENT)
Dept: HEALTH INFORMATION MANAGEMENT | Facility: CLINIC | Age: 69
End: 2024-07-14
Payer: COMMERCIAL

## 2024-09-15 ENCOUNTER — HEALTH MAINTENANCE LETTER (OUTPATIENT)
Age: 69
End: 2024-09-15

## 2024-09-16 ENCOUNTER — OFFICE VISIT (OUTPATIENT)
Dept: FAMILY MEDICINE | Facility: CLINIC | Age: 69
End: 2024-09-16
Payer: COMMERCIAL

## 2024-09-16 VITALS
WEIGHT: 154 LBS | OXYGEN SATURATION: 99 % | HEART RATE: 69 BPM | TEMPERATURE: 97.9 F | HEIGHT: 56 IN | DIASTOLIC BLOOD PRESSURE: 87 MMHG | SYSTOLIC BLOOD PRESSURE: 138 MMHG | RESPIRATION RATE: 18 BRPM | BODY MASS INDEX: 34.64 KG/M2

## 2024-09-16 DIAGNOSIS — I25.10 ASCVD (ARTERIOSCLEROTIC CARDIOVASCULAR DISEASE): ICD-10-CM

## 2024-09-16 DIAGNOSIS — D12.6 TUBULAR ADENOMA OF COLON: ICD-10-CM

## 2024-09-16 DIAGNOSIS — E11.9 TYPE 2 DIABETES MELLITUS WITHOUT COMPLICATION, WITHOUT LONG-TERM CURRENT USE OF INSULIN (H): Primary | ICD-10-CM

## 2024-09-16 DIAGNOSIS — E78.5 HYPERLIPIDEMIA LDL GOAL <130: ICD-10-CM

## 2024-09-16 DIAGNOSIS — I10 BENIGN ESSENTIAL HYPERTENSION: ICD-10-CM

## 2024-09-16 DIAGNOSIS — R09.89 ABNORMAL SENSATION OF THORAX: ICD-10-CM

## 2024-09-16 DIAGNOSIS — M17.0 PRIMARY OSTEOARTHRITIS OF BOTH KNEES: ICD-10-CM

## 2024-09-16 LAB
ALBUMIN SERPL BCG-MCNC: 3.8 G/DL (ref 3.5–5.2)
ALP SERPL-CCNC: 97 U/L (ref 40–150)
ALT SERPL W P-5'-P-CCNC: 12 U/L (ref 0–50)
ANION GAP SERPL CALCULATED.3IONS-SCNC: 9 MMOL/L (ref 7–15)
AST SERPL W P-5'-P-CCNC: 22 U/L (ref 0–45)
BILIRUB SERPL-MCNC: 0.3 MG/DL
BUN SERPL-MCNC: 15.6 MG/DL (ref 8–23)
CALCIUM SERPL-MCNC: 9.2 MG/DL (ref 8.8–10.4)
CHLORIDE SERPL-SCNC: 104 MMOL/L (ref 98–107)
CHOLEST SERPL-MCNC: 225 MG/DL
CREAT SERPL-MCNC: 0.65 MG/DL (ref 0.51–0.95)
EGFRCR SERPLBLD CKD-EPI 2021: >90 ML/MIN/1.73M2
ERYTHROCYTE [DISTWIDTH] IN BLOOD BY AUTOMATED COUNT: 13.7 % (ref 10–15)
FASTING STATUS PATIENT QL REPORTED: YES
FASTING STATUS PATIENT QL REPORTED: YES
GLUCOSE SERPL-MCNC: 145 MG/DL (ref 70–99)
HBA1C MFR BLD: 7.2 % (ref 0–5.6)
HCO3 SERPL-SCNC: 27 MMOL/L (ref 22–29)
HCT VFR BLD AUTO: 37.9 % (ref 35–47)
HDLC SERPL-MCNC: 52 MG/DL
HGB BLD-MCNC: 11.8 G/DL (ref 11.7–15.7)
LDLC SERPL CALC-MCNC: 137 MG/DL
MCH RBC QN AUTO: 27.6 PG (ref 26.5–33)
MCHC RBC AUTO-ENTMCNC: 31.1 G/DL (ref 31.5–36.5)
MCV RBC AUTO: 89 FL (ref 78–100)
NONHDLC SERPL-MCNC: 173 MG/DL
PLATELET # BLD AUTO: 302 10E3/UL (ref 150–450)
POTASSIUM SERPL-SCNC: 4.9 MMOL/L (ref 3.4–5.3)
PROT SERPL-MCNC: 7.2 G/DL (ref 6.4–8.3)
RBC # BLD AUTO: 4.28 10E6/UL (ref 3.8–5.2)
SODIUM SERPL-SCNC: 140 MMOL/L (ref 135–145)
TRIGL SERPL-MCNC: 179 MG/DL
WBC # BLD AUTO: 8.8 10E3/UL (ref 4–11)

## 2024-09-16 PROCEDURE — 85027 COMPLETE CBC AUTOMATED: CPT | Performed by: INTERNAL MEDICINE

## 2024-09-16 PROCEDURE — 36415 COLL VENOUS BLD VENIPUNCTURE: CPT | Performed by: INTERNAL MEDICINE

## 2024-09-16 PROCEDURE — 80053 COMPREHEN METABOLIC PANEL: CPT | Performed by: INTERNAL MEDICINE

## 2024-09-16 PROCEDURE — 80061 LIPID PANEL: CPT | Performed by: INTERNAL MEDICINE

## 2024-09-16 PROCEDURE — 99214 OFFICE O/P EST MOD 30 MIN: CPT | Performed by: INTERNAL MEDICINE

## 2024-09-16 PROCEDURE — 83036 HEMOGLOBIN GLYCOSYLATED A1C: CPT | Performed by: INTERNAL MEDICINE

## 2024-09-16 ASSESSMENT — PAIN SCALES - GENERAL: PAINLEVEL: EXTREME PAIN (9)

## 2024-09-16 NOTE — RESULT ENCOUNTER NOTE
It was a pleasure seeing you.  You should be able to view all of your test results.    Your cholesterol is much higher this time at 225.  I you taking the cholesterol pill on a regular basis?  Please send me a note back and let me know.    Your blood salts, kidney tests, liver tests, proteins, and complete blood count are all normal.    Your diabetes test or hemoglobin A1c is higher at 7.2.  I would strongly recommend exercise, healthy diet, and getting your weight down for this.    Please let me know about the cholesterol medication.    Yoni Baxter M.D.

## 2024-09-16 NOTE — PROGRESS NOTES
This is a 69-year-old who I am seeing for follow-up on multiple issues.  Patient has diabetes.  No sores on her feet or toes.  She is not on medications.    Patient has hypertension which is controlled.  She has hyperlipidemia with a history of medically managed coronary disease.    Patient has a history of vitamin D deficiency, colon polyp and is up-to-date there.    Her biggest issue is knee DJD.  She has knee pain for years.  She has had cortisone shots but she still is bothered by this.    She also can have chest burning.  It is not the same as her heart symptoms.  She is really not having chest pain or shortness of breath.  When she gets that the Tums will relieve it.  No dysphagia.  No nausea or vomiting or unexplained weight loss.    Past Medical History:   Diagnosis Date    Arthritis of knee, degenerative     seen by Dr. Jones 2017 and cortisone did not help    ASCVD (arteriosclerotic cardiovascular disease) 06/2021    on angio after pos est for collazo.  Non obstructive cad, med mgmt    Chest pain 04/2015    nl est echo    Dizziness years    GERD (gastroesophageal reflux disease)     H/O colonoscopy 2014    tics, tortuous, fu 5 years    HTN (hypertension) 1988    Hypercholesteraemia 2010    Screening for osteoporosis 10/2020    nl dexa    Tubular adenoma of colon 06/2024    fu 5 years    Type II or unspecified type diabetes mellitus without mention of complication, not stated as uncontrolled     not on meds as of 2018    Vitamin D deficiency      Past Surgical History:   Procedure Laterality Date    CATARACT EXTRACTION  05/2023    COLONOSCOPY N/A 11/18/2014    Procedure: COLONOSCOPY;  Surgeon: Yoni Carvajal MD;  Location:  GI    COLONOSCOPY N/A 6/24/2024    Procedure: Colonoscopy;  Surgeon: Liyah Nesbitt MD;  Location:  GI    CV HEART CATHETERIZATION WITH POSSIBLE INTERVENTION N/A 06/17/2021    Procedure: Coronary Angiogram;  Surgeon: Renata Herring MD;  Location:  HEART CARDIAC CATH LAB    CV  LEFT HEART CATH N/A 06/17/2021    Procedure: Left Heart Cath;  Surgeon: Renata Herring MD;  Location:  HEART CARDIAC CATH LAB    fibroid surgery  01/01/2000    HYSTERECTOMY, PAP NO LONGER INDICATED  01/01/2006    estrada/bso due to pain and bleeding    LAPAROSCOPIC APPENDECTOMY  05/19/2014    Procedure: LAPAROSCOPIC APPENDECTOMY;  Surgeon: Will Barth MD;  Location: [facility]     Social History     Socioeconomic History    Marital status:      Spouse name: Not on file    Number of children: 1    Years of education: Not on file    Highest education level: Not on file   Occupational History    Occupation: work dietary, in kitchen     Employer: [employer]   Tobacco Use    Smoking status: Never    Smokeless tobacco: Never   Substance and Sexual Activity    Alcohol use: No     Alcohol/week: 0.0 standard drinks of alcohol    Drug use: No    Sexual activity: Not Currently   Other Topics Concern    Not on file   Social History Narrative    Not on file     Social Determinants of Health     Financial Resource Strain: Not on file   Food Insecurity: Not on file   Transportation Needs: Not on file   Physical Activity: Not on file   Stress: Not on file   Social Connections: Not on file   Interpersonal Safety: Low Risk  (6/17/2024)    Interpersonal Safety     Do you feel physically and emotionally safe where you currently live?: Yes     Within the past 12 months, have you been hit, slapped, kicked or otherwise physically hurt by someone?: No     Within the past 12 months, have you been humiliated or emotionally abused in other ways by your partner or ex-partner?: No   Housing Stability: Not on file     Current Outpatient Medications   Medication Sig Dispense Refill    aspirin 81 MG EC tablet Take 81 mg by mouth as needed for moderate pain      cholecalciferol (VITAMIN D3) 25 mcg (1000 units) capsule Take 1 capsule by mouth daily      hydroCHLOROthiazide 12.5 MG tablet Take 1 tablet (12.5 mg) by  "mouth daily 90 tablet 3    irbesartan (AVAPRO) 300 MG tablet Take 1 tablet (300 mg) by mouth at bedtime 90 tablet 3    metoprolol succinate ER (TOPROL XL) 25 MG 24 hr tablet Take 2 tablets (50 mg) by mouth daily 180 tablet 2    simvastatin (ZOCOR) 20 MG tablet Take 1 tablet (20 mg) by mouth at bedtime 90 tablet 3    nitroGLYcerin (NITROSTAT) 0.4 MG sublingual tablet For chest pain place 1 tablet under the tongue every 5 minutes for 3 doses. If symptoms persist 5 minutes after 1st dose call 911. 30 tablet 11     No Known Allergies  FAMILY HISTORY NOTED AND REVIEWED    REVIEW OF SYSTEMS: above    PHYSICAL EXAM    /87 (BP Location: Right arm, Patient Position: Sitting, Cuff Size: Adult Large)   Pulse 69   Temp 97.9  F (36.6  C)   Resp 18   Ht 1.422 m (4' 8\")   Wt 69.9 kg (154 lb)   SpO2 99%   BMI 34.53 kg/m      Patient appears non toxic  Mouth - tongue midline and within normal limits, mucous membranes and posterior pharynx within normal limits, no lesions seen.  Neck - no masses, lesions or tenderness  Nodes - no supraclavicular, cervical or axially adenopathy .  Lungs - clear, normal flow  Cardiovascular - regular rate and rhythm, no murmer, rub or gallop, no jvp or edema, carotids within normal limits, no bruits.  Abdomen - normal active bowel sounds, soft, non tender, no masses, guarding or rebound, no hepatosplenomegaly  Feet within normal limits bilat    Labs sent    ASSESSMENT:  Diabetes, not on treatment, feet fine, labs today  Hypertension, controlled  Hyperlipidemia, tolerating Zocor, she did not tolerate other statins  ASCVD, med management  Knee pain, to Ortho  Chest burning, I doubt cardiovascular tumor.  I will order an upper endoscopy  Healthcare maintenance    PLAN:  Routine mammogram  Vaccines at pharmacy  Upper endoscopy  Letter with labs  See Ortho  Follow-up in 6-month      Yoni Baxter M.D.                "

## 2024-09-16 NOTE — PATIENT INSTRUCTIONS
Dr. Will Kulkarni at [facility] in [place] is very good.    I would get the covid shot and flu shot at the pharmacy now.  I would also get a rsv vaccine in about 2 months.    Get your mammogram.

## 2024-10-02 ENCOUNTER — TELEPHONE (OUTPATIENT)
Dept: GASTROENTEROLOGY | Facility: CLINIC | Age: 69
End: 2024-10-02
Payer: COMMERCIAL

## 2024-10-02 NOTE — TELEPHONE ENCOUNTER
"Endoscopy Scheduling Screen    Have you had any respiratory illness or flu-like symptoms in the last 10 days?  No    What is your communication preference for Instructions and/or Bowel Prep?   MyChart    What insurance is in the chart?  Other:  [facility]    Ordering/Referring Provider: Vee   (If ordering provider performs procedure, schedule with ordering provider unless otherwise instructed. )    BMI: Estimated body mass index is 34.53 kg/m  as calculated from the following:    Height as of 9/16/24: 1.422 m (4' 8\").    Weight as of 9/16/24: 69.9 kg (154 lb).     Sedation Ordered  moderate sedation.   If patient BMI > 50 do not schedule in ASC.    If patient BMI > 45 do not schedule at [facility].    Are you taking methadone or Suboxone?  NO, No RN review required.    Have you been diagnosed and are being treated for severe PTSD or severe anxiety?  NO, No RN review required.    Are you taking any prescription medications for pain 3 or more times per week?   NO, No RN review required.    Do you have a history of malignant hyperthermia?  No    (Females) Are you currently pregnant?        Have you been diagnosed or told you have pulmonary hypertension?   No    Do you have an LVAD?  No    Have you been told you have moderate to severe sleep apnea?  No.    Have you been told you have COPD, asthma, or any other lung disease?  No    Do you have any heart conditions?  No     Have you ever had or are you waiting for an organ transplant?  No. Continue scheduling, no site restrictions.    Have you had a stroke or transient ischemic attack (TIA aka \"mini stroke\" in the last 6 months?   No    Have you been diagnosed with or been told you have cirrhosis of the liver?   No.    Are you currently on dialysis?   No    Do you need assistance transferring?   No    BMI: Estimated body mass index is 34.53 kg/m  as calculated from the following:    Height as of 9/16/24: 1.422 m (4' 8\").    Weight as of 9/16/24: 69.9 kg (154 lb).     Is patients " BMI > 40 and scheduling location [facility]?  No    Do you take an injectable or oral medication for weight loss or diabetes (excluding insulin)?  No    Do you take the medication Naltrexone?  No    Do you take blood thinners?  No       Prep   Are you currently on dialysis or do you have chronic kidney disease?  No    Do you have a diagnosis of diabetes?  No-does control diet- labs are a bit high    Do you have a diagnosis of cystic fibrosis (CF)?  No    On a regular basis do you go 3 -5 days between bowel movements?      BMI > 40?  No    Preferred Pharmacy:    [id number] - [place], [place] - [address]   [address] [facility] [address]  Phone: [phone] Fax: [phone]    Final Scheduling Details     Procedure scheduled  Upper endoscopy (EGD)    Surgeon:  Phil     Date of procedure:  10/15/24     Pre-OP / PAC:   No - Not required for this site.    Location  [facility] - Patient preference.    Sedation   Moderate Sedation - Per order.      Patient Reminders:   You will receive a call from a Nurse to review instructions and health history.  This assessment must be completed prior to your procedure.  Failure to complete the Nurse assessment may result in the procedure being cancelled.      On the day of your procedure, please designate an adult(s) who can drive you home stay with you for the next 24 hours. The medicines used in the exam will make you sleepy. You will not be able to drive.      You cannot take public transportation, ride share services, or non-medical taxi service without a responsible caregiver.  Medical transport services are allowed with the requirement that a responsible caregiver will receive you at your destination.  We require that drivers and caregivers are confirmed prior to your procedure.

## 2024-10-02 NOTE — TELEPHONE ENCOUNTER
Caller: Teresa      Reason for Reschedule/Cancellation   (please be detailed, any staff messages or encounters to note?): No       Prior to reschedule please review:  Ordering Provider:   Yoni Baxter MD  Sedation Determined: CS  Does patient have any ASC Exclusions, please identify?: N      Notes on Cancelled Procedure:  Procedure: Upper Endoscopy [EGD]   Date: 10/15/2024  Location: [facility]; [address] [place] [address]   Surgeon: Phil      Rescheduled: Yes,   Procedure: Upper Endoscopy [EGD]    Date: 11/11/2024   Location: [facility]; [address] [place] [address]    Surgeon: Tommy   Sedation Level Scheduled  CS ,  Reason for Sedation Level Order   Instructions updated and sent: y - please note pt requested infor sent both MyChart and Postal    Does patient need PAC or Pre -Op Rescheduled? : n       Did you cancel or rescheduled an EUS procedure? No.

## 2024-10-24 ENCOUNTER — TRANSFERRED RECORDS (OUTPATIENT)
Dept: HEALTH INFORMATION MANAGEMENT | Facility: CLINIC | Age: 69
End: 2024-10-24
Payer: COMMERCIAL

## 2024-10-28 ENCOUNTER — TELEPHONE (OUTPATIENT)
Dept: GASTROENTEROLOGY | Facility: CLINIC | Age: 69
End: 2024-10-28
Payer: COMMERCIAL

## 2024-10-28 NOTE — TELEPHONE ENCOUNTER
"Rescheduled Procedure  Due to:  no     Pre assessment completed for upcoming procedure.      Procedure details:    Patient scheduled for Upper endoscopy (EGD) on 11/11/24.     Arrival time: 1415. Procedure time 1500    Facility location: [facility]; [facility] [place] [address]. Check in location: 1st Floor [facility].     Sedation type: Conscious sedation     Pre op exam needed? No.    Indication for procedure: Abnormal sensation of thorax, \"chest burning\"    Designated  policy reviewed. Instructed to have someone stay 6 hours post procedure.       Chart review:     Electronic implanted devices? No    Recent diagnosis of diverticulitis within the last 6 weeks?  N/A      Medication review:    Diabetic? Yes. Not on diabetic medication    Anticoagulants? No    Weight loss medication/injectable? No.    Other medication HOLDING recommendations:  N/A      Prep for procedure:     Bowel prep recommendation: N/A    Prep instructions sent via [employer] and letter by  at time of scheduling on 10/2/24.  Patient reports she already received the letter with instructions.     Reviewed procedure prep instructions.     Patient verbalized understanding and had no questions or concerns at this time.        Nicci Sterling RN  Endoscopy Procedure Pre Assessment   [phone] option 2  "

## 2024-11-11 ENCOUNTER — HOSPITAL ENCOUNTER (OUTPATIENT)
Facility: CLINIC | Age: 69
Discharge: HOME | End: 2024-11-11
Attending: INTERNAL MEDICINE | Admitting: INTERNAL MEDICINE
Payer: COMMERCIAL

## 2024-11-11 VITALS
HEIGHT: 56 IN | DIASTOLIC BLOOD PRESSURE: 95 MMHG | OXYGEN SATURATION: 100 % | HEART RATE: 73 BPM | SYSTOLIC BLOOD PRESSURE: 147 MMHG | RESPIRATION RATE: 12 BRPM | BODY MASS INDEX: 34.64 KG/M2 | WEIGHT: 154 LBS

## 2024-11-11 DIAGNOSIS — K21.9 GASTROESOPHAGEAL REFLUX DISEASE WITHOUT ESOPHAGITIS: Primary | ICD-10-CM

## 2024-11-11 LAB — UPPER GI ENDOSCOPY: NORMAL

## 2024-11-11 PROCEDURE — 999N000099 HC STATISTIC MODERATE SEDATION < 10 MIN: Performed by: INTERNAL MEDICINE

## 2024-11-11 PROCEDURE — 250N000011 HC RX IP 250 OP 636: Performed by: INTERNAL MEDICINE

## 2024-11-11 PROCEDURE — 43235 EGD DIAGNOSTIC BRUSH WASH: CPT | Performed by: INTERNAL MEDICINE

## 2024-11-11 RX ORDER — FENTANYL CITRATE 50 UG/ML
INJECTION, SOLUTION INTRAMUSCULAR; INTRAVENOUS PRN
Status: DISCONTINUED | OUTPATIENT
Start: 2024-11-11 | End: 2024-11-15 | Stop reason: HOSPADM

## 2024-11-11 ASSESSMENT — ACTIVITIES OF DAILY LIVING (ADL)
ADLS_ACUITY_SCORE: 0

## 2024-11-11 NOTE — H&P
[facility]  [id number]  female  69 year old      Reason for procedure/surgery: reflux    Patient Active Problem List   Diagnosis    Benign essential hypertension    Hyperlipidemia LDL goal <130    Vitamin D deficiency    Type 2 diabetes mellitus without complication, without long-term current use of insulin (H)    Gastroesophageal reflux disease without esophagitis    ASCVD (arteriosclerotic cardiovascular disease)    Class 2 severe obesity due to excess calories with serious comorbidity in adult (H)    Tubular adenoma of colon       Past Surgical History:    Past Surgical History:   Procedure Laterality Date    CATARACT EXTRACTION  05/2023    COLONOSCOPY N/A 11/18/2014    Procedure: COLONOSCOPY;  Surgeon: Yoni Carvajal MD;  Location:  GI    COLONOSCOPY N/A 6/24/2024    Procedure: Colonoscopy;  Surgeon: Liyah Nesbitt MD;  Location:  GI    CV HEART CATHETERIZATION WITH POSSIBLE INTERVENTION N/A 06/17/2021    Procedure: Coronary Angiogram;  Surgeon: Renata Herring MD;  Location:  HEART CARDIAC CATH LAB    CV LEFT HEART CATH N/A 06/17/2021    Procedure: Left Heart Cath;  Surgeon: Renata Herring MD;  Location:  HEART CARDIAC CATH LAB    fibroid surgery  01/01/2000    HYSTERECTOMY, PAP NO LONGER INDICATED  01/01/2006    estrada/bso due to pain and bleeding    LAPAROSCOPIC APPENDECTOMY  05/19/2014    Procedure: LAPAROSCOPIC APPENDECTOMY;  Surgeon: Will Barth MD;  Location: [facility]       Past Medical History:   Past Medical History:   Diagnosis Date    Arthritis of knee, degenerative     seen by Dr. Jones 2017 and cortisone did not help    ASCVD (arteriosclerotic cardiovascular disease) 06/2021    on angio after pos est for collazo.  Non obstructive cad, med mgmt    Chest pain 04/2015    nl est echo    Dizziness years    GERD (gastroesophageal reflux disease)     H/O colonoscopy 2014    tics, tortuous, fu 5 years    HTN (hypertension) 1988    Hypercholesteraemia 2010    Screening for  "osteoporosis 10/2020    nl dexa    Tubular adenoma of colon 06/2024    fu 5 years    Type II or unspecified type diabetes mellitus without mention of complication, not stated as uncontrolled     not on meds as of 2018    Vitamin D deficiency        Social History:   Social History     Tobacco Use    Smoking status: Never    Smokeless tobacco: Never   Substance Use Topics    Alcohol use: No     Alcohol/week: 0.0 standard drinks of alcohol       Family History:   Family History   Problem Relation Age of Onset    Diabetes Mother     Hypertension Mother     Other - See Comments Sister         4 sisters and 3 brothers, dm, kidney dz       Allergies: No Known Allergies    Active Medications:   Current Outpatient Medications   Medication Sig Dispense Refill    omeprazole (PRILOSEC) 20 MG DR capsule Take 2 capsules (40 mg) by mouth daily for 60 days, THEN 1 capsule (20 mg) daily. 210 capsule 0       Systemic Review:   CONSTITUTIONAL: NEGATIVE for fever, chills, change in weight  ENT/MOUTH: NEGATIVE for ear, mouth and throat problems  RESP: NEGATIVE for significant cough or SOB  CV: NEGATIVE for chest pain, palpitations or peripheral edema    Physical Examination:   Vital Signs: BP (!) 162/110   Pulse 68   Resp 11   Ht 1.422 m (4' 8\")   Wt 69.9 kg (154 lb)   SpO2 99%   BMI 34.53 kg/m    GENERAL: healthy, alert and no distress  NECK: no adenopathy, no asymmetry, masses, or scars  RESP: lungs clear to auscultation - no rales, rhonchi or wheezes  CV: regular rate and rhythm, normal S1 S2, no S3 or S4, no murmur, click or rub, no peripheral edema and peripheral pulses strong  ABDOMEN: soft, nontender, no hepatosplenomegaly, no masses and bowel sounds normal  MS: no gross musculoskeletal defects noted, no edema    ASA Classification: (II)  Mild systemic disease  Airway Exam: Mallampati Score: Class II (Complete visualization of uvula)    Plan: Appropriate to proceed as scheduled.      Sivakumar Sanders, " MD  11/11/2024    PCP:  Yoni Baxter

## 2024-11-15 ASSESSMENT — ACTIVITIES OF DAILY LIVING (ADL)
ADLS_ACUITY_SCORE: 0

## 2025-03-11 ENCOUNTER — TRANSFERRED RECORDS (OUTPATIENT)
Dept: MULTI SPECIALTY CLINIC | Facility: CLINIC | Age: 70
End: 2025-03-11
Payer: COMMERCIAL

## 2025-03-11 LAB — RETINOPATHY: NORMAL

## 2025-03-17 ENCOUNTER — OFFICE VISIT (OUTPATIENT)
Dept: FAMILY MEDICINE | Facility: CLINIC | Age: 70
End: 2025-03-17
Payer: COMMERCIAL

## 2025-03-17 VITALS
TEMPERATURE: 98.4 F | BODY MASS INDEX: 34.64 KG/M2 | DIASTOLIC BLOOD PRESSURE: 82 MMHG | RESPIRATION RATE: 12 BRPM | HEART RATE: 68 BPM | HEIGHT: 56 IN | OXYGEN SATURATION: 97 % | SYSTOLIC BLOOD PRESSURE: 124 MMHG | WEIGHT: 154 LBS

## 2025-03-17 DIAGNOSIS — E55.9 VITAMIN D DEFICIENCY: ICD-10-CM

## 2025-03-17 DIAGNOSIS — R94.39 ABNORMAL CARDIOVASCULAR STRESS TEST: ICD-10-CM

## 2025-03-17 DIAGNOSIS — E78.5 HYPERLIPIDEMIA LDL GOAL <130: ICD-10-CM

## 2025-03-17 DIAGNOSIS — R53.83 TIRED: ICD-10-CM

## 2025-03-17 DIAGNOSIS — I25.10 ASCVD (ARTERIOSCLEROTIC CARDIOVASCULAR DISEASE): ICD-10-CM

## 2025-03-17 DIAGNOSIS — I10 BENIGN ESSENTIAL HYPERTENSION: ICD-10-CM

## 2025-03-17 DIAGNOSIS — Z00.00 ENCOUNTER FOR MEDICARE ANNUAL WELLNESS EXAM: Primary | ICD-10-CM

## 2025-03-17 DIAGNOSIS — E11.9 TYPE 2 DIABETES MELLITUS WITHOUT COMPLICATION, WITHOUT LONG-TERM CURRENT USE OF INSULIN (H): ICD-10-CM

## 2025-03-17 DIAGNOSIS — D12.6 TUBULAR ADENOMA OF COLON: ICD-10-CM

## 2025-03-17 DIAGNOSIS — K21.9 GASTROESOPHAGEAL REFLUX DISEASE WITHOUT ESOPHAGITIS: ICD-10-CM

## 2025-03-17 DIAGNOSIS — M17.0 PRIMARY OSTEOARTHRITIS OF BOTH KNEES: ICD-10-CM

## 2025-03-17 PROBLEM — E66.01 CLASS 2 SEVERE OBESITY DUE TO EXCESS CALORIES WITH SERIOUS COMORBIDITY IN ADULT (H): Status: RESOLVED | Noted: 2024-03-12 | Resolved: 2025-03-17

## 2025-03-17 PROBLEM — E66.812 CLASS 2 SEVERE OBESITY DUE TO EXCESS CALORIES WITH SERIOUS COMORBIDITY IN ADULT (H): Status: RESOLVED | Noted: 2024-03-12 | Resolved: 2025-03-17

## 2025-03-17 LAB
ERYTHROCYTE [DISTWIDTH] IN BLOOD BY AUTOMATED COUNT: 13.8 % (ref 10–15)
EST. AVERAGE GLUCOSE BLD GHB EST-MCNC: 151 MG/DL
HBA1C MFR BLD: 6.9 % (ref 0–5.6)
HCT VFR BLD AUTO: 40.7 % (ref 35–47)
HGB BLD-MCNC: 12.4 G/DL (ref 11.7–15.7)
MCH RBC QN AUTO: 27.1 PG (ref 26.5–33)
MCHC RBC AUTO-ENTMCNC: 30.5 G/DL (ref 31.5–36.5)
MCV RBC AUTO: 89 FL (ref 78–100)
PLATELET # BLD AUTO: 311 10E3/UL (ref 150–450)
RBC # BLD AUTO: 4.57 10E6/UL (ref 3.8–5.2)
WBC # BLD AUTO: 6.7 10E3/UL (ref 4–11)

## 2025-03-17 PROCEDURE — 1125F AMNT PAIN NOTED PAIN PRSNT: CPT | Performed by: INTERNAL MEDICINE

## 2025-03-17 PROCEDURE — G0438 PPPS, INITIAL VISIT: HCPCS | Performed by: INTERNAL MEDICINE

## 2025-03-17 PROCEDURE — 80061 LIPID PANEL: CPT | Performed by: INTERNAL MEDICINE

## 2025-03-17 PROCEDURE — 80053 COMPREHEN METABOLIC PANEL: CPT | Performed by: INTERNAL MEDICINE

## 2025-03-17 PROCEDURE — 84443 ASSAY THYROID STIM HORMONE: CPT | Performed by: INTERNAL MEDICINE

## 2025-03-17 PROCEDURE — 36415 COLL VENOUS BLD VENIPUNCTURE: CPT | Performed by: INTERNAL MEDICINE

## 2025-03-17 PROCEDURE — 82306 VITAMIN D 25 HYDROXY: CPT | Performed by: INTERNAL MEDICINE

## 2025-03-17 PROCEDURE — 3074F SYST BP LT 130 MM HG: CPT | Performed by: INTERNAL MEDICINE

## 2025-03-17 PROCEDURE — 82043 UR ALBUMIN QUANTITATIVE: CPT | Performed by: INTERNAL MEDICINE

## 2025-03-17 PROCEDURE — G2211 COMPLEX E/M VISIT ADD ON: HCPCS | Performed by: INTERNAL MEDICINE

## 2025-03-17 PROCEDURE — 99214 OFFICE O/P EST MOD 30 MIN: CPT | Mod: 25 | Performed by: INTERNAL MEDICINE

## 2025-03-17 PROCEDURE — 3079F DIAST BP 80-89 MM HG: CPT | Performed by: INTERNAL MEDICINE

## 2025-03-17 PROCEDURE — 82570 ASSAY OF URINE CREATININE: CPT | Performed by: INTERNAL MEDICINE

## 2025-03-17 PROCEDURE — 85027 COMPLETE CBC AUTOMATED: CPT | Performed by: INTERNAL MEDICINE

## 2025-03-17 PROCEDURE — 83036 HEMOGLOBIN GLYCOSYLATED A1C: CPT | Performed by: INTERNAL MEDICINE

## 2025-03-17 RX ORDER — HYDROCHLOROTHIAZIDE 12.5 MG/1
12.5 TABLET ORAL DAILY
Qty: 90 TABLET | Refills: 3 | Status: SHIPPED | OUTPATIENT
Start: 2025-03-17

## 2025-03-17 RX ORDER — IRBESARTAN 300 MG/1
300 TABLET ORAL AT BEDTIME
Qty: 90 TABLET | Refills: 3 | Status: SHIPPED | OUTPATIENT
Start: 2025-03-17

## 2025-03-17 RX ORDER — METOPROLOL SUCCINATE 25 MG/1
50 TABLET, EXTENDED RELEASE ORAL DAILY
Qty: 180 TABLET | Refills: 3 | Status: SHIPPED | OUTPATIENT
Start: 2025-03-17

## 2025-03-17 RX ORDER — SIMVASTATIN 20 MG
20 TABLET ORAL AT BEDTIME
Qty: 90 TABLET | Refills: 3 | Status: SHIPPED | OUTPATIENT
Start: 2025-03-17

## 2025-03-17 ASSESSMENT — PAIN SCALES - GENERAL: PAINLEVEL_OUTOF10: SEVERE PAIN (8)

## 2025-03-17 NOTE — PATIENT INSTRUCTIONS
Get your mammogram    Try to exercise more    Patient Education   Preventive Care Advice   This is general advice given by our system to help you stay healthy. However, your care team may have specific advice just for you. Please talk to your care team about your preventive care needs.  Nutrition  Eat 5 or more servings of fruits and vegetables each day.  Try wheat bread, brown rice and whole grain pasta (instead of white bread, rice, and pasta).  Get enough calcium and vitamin D. Check the label on foods and aim for 100% of the RDA (recommended daily allowance).  Lifestyle  Exercise at least 150 minutes each week  (30 minutes a day, 5 days a week).  Do muscle strengthening activities 2 days a week. These help control your weight and prevent disease.  No smoking.  Wear sunscreen to prevent skin cancer.  Have a dental exam and cleaning every 6 months.  Yearly exams  See your health care team every year to talk about:  Any changes in your health.  Any medicines your care team has prescribed.  Preventive care, family planning, and ways to prevent chronic diseases.  Shots (vaccines)   HPV shots (up to age 26), if you've never had them before.  Hepatitis B shots (up to age 59), if you've never had them before.  COVID-19 shot: Get this shot when it's due.  Flu shot: Get a flu shot every year.  Tetanus shot: Get a tetanus shot every 10 years.  Pneumococcal, hepatitis A, and RSV shots: Ask your care team if you need these based on your risk.  Shingles shot (for age 50 and up)  General health tests  Diabetes screening:  Starting at age 35, Get screened for diabetes at least every 3 years.  If you are younger than age 35, ask your care team if you should be screened for diabetes.  Cholesterol test: At age 39, start having a cholesterol test every 5 years, or more often if advised.  Bone density scan (DEXA): At age 50, ask your care team if you should have this scan for osteoporosis (brittle bones).  Hepatitis C: Get tested at  least once in your life.  STIs (sexually transmitted infections)  Before age 24: Ask your care team if you should be screened for STIs.  After age 24: Get screened for STIs if you're at risk. You are at risk for STIs (including HIV) if:  You are sexually active with more than one person.  You don't use condoms every time.  You or a partner was diagnosed with a sexually transmitted infection.  If you are at risk for HIV, ask about PrEP medicine to prevent HIV.  Get tested for HIV at least once in your life, whether you are at risk for HIV or not.  Cancer screening tests  Cervical cancer screening: If you have a cervix, begin getting regular cervical cancer screening tests starting at age 21.  Breast cancer scan (mammogram): If you've ever had breasts, begin having regular mammograms starting at age 40. This is a scan to check for breast cancer.  Colon cancer screening: It is important to start screening for colon cancer at age 45.  Have a colonoscopy test every 10 years (or more often if you're at risk) Or, ask your provider about stool tests like a FIT test every year or Cologuard test every 3 years.  To learn more about your testing options, visit:   .  For help making a decision, visit:   https://bit.ly/ad93514.  Prostate cancer screening test: If you have a prostate, ask your care team if a prostate cancer screening test (PSA) at age 55 is right for you.  Lung cancer screening: If you are a current or former smoker ages 50 to 80, ask your care team if ongoing lung cancer screenings are right for you.  For informational purposes only. Not to replace the advice of your health care provider. Copyright   2023 [facility]. All rights reserved. Clinically reviewed by the [facility]. [employer] family too 236296 - REV 01/24.  Preventing Falls: Care Instructions  Injuries and health problems such as trouble walking or poor eyesight can increase your risk of falling. So can some medicines. But  "there are things you can do to help prevent falls. You can exercise to get stronger. You can also arrange your home to make it safer.    Talk to your doctor about the medicines you take. Ask if any of them increase the risk of falls and whether they can be changed or stopped.   Try to exercise regularly. It can help improve your strength and balance. This can help lower your risk of falling.         Practice fall safety and prevention.   Wear low-heeled shoes that fit well and give your feet good support. Talk to your doctor if you have foot problems that make this hard.  Carry a cellphone or wear a medical alert device that you can use to call for help.  Use stepladders instead of chairs to reach high objects. Don't climb if you're at risk for falls. Ask for help, if needed.  Wear the correct eyeglasses, if you need them.        Make your home safer.   Remove rugs, cords, clutter, and furniture from walkways.  Keep your house well lit. Use night-lights in hallways and bathrooms.  Install and use sturdy handrails on stairways.  Wear nonskid footwear, even inside. Don't walk barefoot or in socks without shoes.        Be safe outside.   Use handrails, curb cuts, and ramps whenever possible.  Keep your hands free by using a shoulder bag or backpack.  Try to walk in well-lit areas. Watch out for uneven ground, changes in pavement, and debris.  Be careful in the winter. Walk on the grass or gravel when sidewalks are slippery. Use de-icer on steps and walkways. Add non-slip devices to shoes.    Put grab bars and nonskid mats in your shower or tub and near the toilet. Try to use a shower chair or bath bench when bathing.   Get into a tub or shower by putting in your weaker leg first. Get out with your strong side first. Have a phone or medical alert device in the bathroom with you.   Where can you learn more?  Go to https://www.[employer].net/patiented  Enter G117 in the search box to learn more about \"Preventing Falls: " "Care Instructions.\"  Current as of: July 31, 2024  Content Version: 14.4    [id number] [employer].   Care instructions adapted under license by your healthcare professional. If you have questions about a medical condition or this instruction, always ask your healthcare professional. [employer] disclaims any warranty or liability for your use of this information.    Learning About Sleeping Well  What does sleeping well mean?     Sleeping well means getting enough sleep to feel good and stay healthy. How much sleep is enough varies among people.  The number of hours you sleep and how you feel when you wake up are both important. If you do not feel refreshed, you probably need more sleep. Another sign of not getting enough sleep is feeling tired during the day.  Experts recommend that adults get at least 7 or more hours of sleep per day. Children and older adults need more sleep.  Why is getting enough sleep important?  Getting enough quality sleep is a basic part of good health. When your sleep suffers, your physical health, mood, and your thoughts can suffer too. You may find yourself feeling more grumpy or stressed. Not getting enough sleep also can lead to serious problems, including injury, accidents, anxiety, and depression.  What might cause poor sleeping?  Many things can cause sleep problems, including:  Changes to your sleep schedule.  Stress. Stress can be caused by fear about a single event, such as giving a speech. Or you may have ongoing stress, such as worry about work or school.  Depression, anxiety, and other mental or emotional conditions.  Changes in your sleep habits or surroundings. This includes changes that happen where you sleep, such as noise, light, or sleeping in a different bed. It also includes changes in your sleep pattern, such as having jet lag or working a late shift.  Health problems, such as pain, breathing problems, and restless legs syndrome.  Lack of regular " "exercise.  Using alcohol, nicotine, or caffeine before bed.  How can you help yourself?  Here are some tips that may help you sleep more soundly and wake up feeling more refreshed.  Your sleeping area   Use your bedroom only for sleeping and sex. A bit of light reading may help you fall asleep. But if it doesn't, do your reading elsewhere in the house. Try not to use your TV, computer, smartphone, or tablet while you are in bed.  Be sure your bed is big enough to stretch out comfortably, especially if you have a sleep partner.  Keep your bedroom quiet, dark, and cool. Use curtains, blinds, or a sleep mask to block out light. To block out noise, use earplugs, soothing music, or a \"white noise\" machine.  Your evening and bedtime routine   Create a relaxing bedtime routine. You might want to take a warm shower or bath, or listen to soothing music.  Go to bed at the same time every night. And get up at the same time every morning, even if you feel tired.  What to avoid   Limit caffeine (coffee, tea, caffeinated sodas) during the day, and don't have any for at least 6 hours before bedtime.  Avoid drinking alcohol before bedtime. Alcohol can cause you to wake up more often during the night.  Try not to smoke or use tobacco, especially in the evening. Nicotine can keep you awake.  Limit naps during the day, especially close to bedtime.  Avoid lying in bed awake for too long. If you can't fall asleep or if you wake up in the middle of the night and can't get back to sleep within about 20 minutes, get out of bed and go to another room until you feel sleepy.  Avoid taking medicine right before bed that may keep you awake or make you feel hyper or energized. Your doctor can tell you if your medicine may do this and if you can take it earlier in the day.  If you can't sleep   Imagine yourself in a peaceful, pleasant scene. Focus on the details and feelings of being in a place that is relaxing.  Get up and do a quiet or boring " "activity until you feel sleepy.  Avoid drinking any liquids before going to bed to help prevent waking up often to use the bathroom.  Where can you learn more?  Go to https://www.[employer].net/patiented  Enter J942 in the search box to learn more about \"Learning About Sleeping Well.\"  Current as of: July 31, 2024  Content Version: 14.4    [id number] [employer].   Care instructions adapted under license by your healthcare professional. If you have questions about a medical condition or this instruction, always ask your healthcare professional. [employer] disclaims any warranty or liability for your use of this information.       "

## 2025-03-17 NOTE — PROGRESS NOTES
Preventive Care Visit  [facility] AISHWARYA Baxter MD, Internal Medicine  Mar 17, 2025          Merrill Moore is a 69 year old, presenting for the following:    This is a 69-year-old here for annual wellness visit.  She has multiple medical issues including diabetes.  Her last A1c was elevated at 7.2.  She is not on medication for it.    When I saw her last in September she noted some chest burning.  Upper endoscopy was done with some findings as noted.  Since then she does not really have this.    She does note some tiredness.  Sleep is fair.  She does not check her sugars.  She is up-to-date on her eye exam.  No sores on her feet or toes.  No chest pain.  Some dyspnea on exertion for a long time which is not new.  She is up-to-date on her colon exam.  She is overdue for mammogram.  She has arthritis of her knees bilaterally and may have to have those replaced.    She is , she takes care of her 3-year-old grandchild during the day as the parents are at work.               Past Medical History:      Past Medical History:   Diagnosis Date    Arthritis of knee, degenerative     seen by Dr. Jones 2017 and cortisone did not help    ASCVD (arteriosclerotic cardiovascular disease) 06/2021    on angio after pos est for collazo.  Non obstructive cad, med mgmt    Chest pain 04/2015    nl est echo    Dizziness years    GERD (gastroesophageal reflux disease)     egd 11/24, reflux esoph and h.h.    H/O colonoscopy 2014    tics, tortuous, fu 5 years    HTN (hypertension) 1988    Hypercholesteraemia 2010    Screening for osteoporosis 10/2020    nl dexa    Tubular adenoma of colon 06/2024    fu 5 years    Type II or unspecified type diabetes mellitus without mention of complication, not stated as uncontrolled     not on meds as of 2018    Vitamin D deficiency              Past Surgical History:      Past Surgical History:   Procedure Laterality Date    CATARACT EXTRACTION  05/2023    COLONOSCOPY N/A  11/18/2014    Procedure: COLONOSCOPY;  Surgeon: Yoni Carvajal MD;  Location:  GI    COLONOSCOPY N/A 6/24/2024    Procedure: Colonoscopy;  Surgeon: Liyah Nesbitt MD;  Location:  GI    CV HEART CATHETERIZATION WITH POSSIBLE INTERVENTION N/A 06/17/2021    Procedure: Coronary Angiogram;  Surgeon: Renata Herring MD;  Location:  HEART CARDIAC CATH LAB    CV LEFT HEART CATH N/A 06/17/2021    Procedure: Left Heart Cath;  Surgeon: Renata Herring MD;  Location:  HEART CARDIAC CATH LAB    ESOPHAGOSCOPY, GASTROSCOPY, DUODENOSCOPY (EGD), COMBINED N/A 11/11/2024    Procedure: Esophagoscopy, gastroscopy, duodenoscopy (EGD), combined;  Surgeon: Sivakumar Sanders MD;  Location:  GI    fibroid surgery  01/01/2000    HYSTERECTOMY, PAP NO LONGER INDICATED  01/01/2006    estrada/bso due to pain and bleeding    LAPAROSCOPIC APPENDECTOMY  05/19/2014    Procedure: LAPAROSCOPIC APPENDECTOMY;  Surgeon: Will Barth MD;  Location: [facility]             Social History:     Social History     Socioeconomic History    Marital status:      Spouse name: Not on file    Number of children: 1    Years of education: Not on file    Highest education level: Not on file   Occupational History    Occupation: work dietary, in kitchen     Employer: [employer]   Tobacco Use    Smoking status: Never    Smokeless tobacco: Never   Substance and Sexual Activity    Alcohol use: No     Alcohol/week: 0.0 standard drinks of alcohol    Drug use: No    Sexual activity: Not Currently   Other Topics Concern    Not on file   Social History Narrative    Not on file     Social Drivers of Health     Financial Resource Strain: Low Risk  (3/17/2025)    Financial Resource Strain     Within the past 12 months, have you or your family members you live with been unable to get utilities (heat, electricity) when it was really needed?: No   Food Insecurity: Low Risk  (3/17/2025)    Food Insecurity     Within the past 12  months, did you worry that your food would run out before you got money to buy more?: No     Within the past 12 months, did the food you bought just not last and you didn t have money to get more?: No   Transportation Needs: Low Risk  (3/17/2025)    Transportation Needs     Within the past 12 months, has lack of transportation kept you from medical appointments, getting your medicines, non-medical meetings or appointments, work, or from getting things that you need?: No   Physical Activity: Not on file   Stress: Not on file   Social Connections: Not on file   Interpersonal Safety: Low Risk  (3/17/2025)    Interpersonal Safety     Do you feel physically and emotionally safe where you currently live?: Yes     Within the past 12 months, have you been hit, slapped, kicked or otherwise physically hurt by someone?: No     Within the past 12 months, have you been humiliated or emotionally abused in other ways by your partner or ex-partner?: No   Housing Stability: Low Risk  (3/17/2025)    Housing Stability     Do you have housing? : Yes     Are you worried about losing your housing?: No             Family History:   reviewed         Allergies:   No Known Allergies          Medications:     Current Outpatient Medications   Medication Sig Dispense Refill    aspirin 81 MG EC tablet Take 81 mg by mouth as needed for moderate pain      cholecalciferol (VITAMIN D3) 25 mcg (1000 units) capsule Take 1 capsule by mouth daily      hydroCHLOROthiazide 12.5 MG tablet Take 1 tablet (12.5 mg) by mouth daily. 90 tablet 3    irbesartan (AVAPRO) 300 MG tablet Take 1 tablet (300 mg) by mouth at bedtime. 90 tablet 3    metoprolol succinate ER (TOPROL XL) 25 MG 24 hr tablet Take 2 tablets (50 mg) by mouth daily. 180 tablet 3    omeprazole (PRILOSEC) 20 MG DR capsule Take 2 capsules (40 mg) by mouth daily for 60 days, THEN 1 capsule (20 mg) daily. 210 capsule 0    simvastatin (ZOCOR) 20 MG tablet Take 1 tablet (20 mg) by mouth at bedtime. 90  "tablet 3    nitroGLYcerin (NITROSTAT) 0.4 MG sublingual tablet For chest pain place 1 tablet under the tongue every 5 minutes for 3 doses. If symptoms persist 5 minutes after 1st dose call 911. 30 tablet 11               Review of Systems:     The 10 point Review of Systems is negative other than noted in the HPI           Physical Exam:   Blood pressure 124/82, pulse 68, temperature 98.4  F (36.9  C), resp. rate 12, height 1.416 m (4' 7.75\"), weight 69.9 kg (154 lb), SpO2 97%, not currently breastfeeding.    Exam:  Constitutional: healthy appearing, alert and in no distress  Heent: Normocephalic. Head without obvious masses or lesions. PERRLDC, EOMI. Mouth exam within normal limits: tongue, mucous membranes, posterior pharynx all normal, no lesions or abnormalities seen.  Tm's and canals within normal limits bilaterally. Neck supple, no nuchal rigidity or masses. No supraclavicular, or cervical adenopathy. Thyroid symmetric, no masses.  Cardiovascular: Regular rate and rhythm, no murmer, rub or gallops.  JVP not elevated, no edema.  Carotids within normal limits bilaterally, no bruits.  Respiratory: Normal respiratory effort.  Lungs clear, normal flow, no wheezing or crackles.  Breasts: Normal bilaterally.  No masses or lesions.  Nipples within normal limits.  No axillary lesions or nodes.  My M.A. Was present during this part of the examination.  Gastrointestinal: Normal active bowel sounds.   Soft, not tender, no masses, guarding or rebound.  No hepatosplenomegaly.   Musculoskeletal: extremities normal, no gross deformities noted.  Skin: no suspicious lesions or rashes   Neurologic: Mental status within normal limits.  Speech fluent.  No gross motor abnormalities and gait intact.  Psychiatric: mentation appears normal and affect normal.  Foot exam shows no sores or lesions, decreased pulses         Data:   Labs sent        Assessment:   Normal complete physical exam  Diabetes without complications, not on " medication, feet fine, does not check sugars, up-to-date eye exam, labs today.  ASCVD, stable, med management  Acid reflux, no issues  Vitamin D deficiency, labs today  Hyperlipidemia, on statin  Colon polyp, follow-up as noted  Hypertension, controlled  Fatigue, doubt pathologic, check labs  Osteoarthritis of knees, follow-up Ortho  Healthcare maintenance         Plan:   Vaccine at pharmacy  Mammogram  Eye exam when due  Letter with labs  Exercise, diet and weight loss  Up-to-date colon exam  Follow-up in 6 months    The longitudinal plan of care for the diagnosis(es)/condition(s) as documented were addressed during this visit. Due to the added complexity in care, I will continue to support Teresa in the subsequent management and with ongoing continuity of care.        Yoni Baxter M.D.    Appropriate preventive services were discussed with this patient via screening, questionairre, or discussion as appropriate for fall prevention, nutrition, physical activity, social engagement, weight loss and cognition.  Check list reviewing preventive services available has been given to the patient.  Reviewed patient's diet, addressing concerns and questions as appropriate.  Advice given for smoking sensation when appropriate.          Physical (Fasting )            HPI           Advance Care Planning  Patient does not have a Health Care Directive:       3/17/2025   General Health   How would you rate your overall physical health? (!) FAIR         3/17/2025   Nutrition   Diet: Regular (no restrictions)         5/24/2022   Exercise   Frequency of exercise: None         3/17/2025   Social Factors   Worry food won't last until get money to buy more No   Food not last or not have enough money for food? No   Do you have housing? (Housing is defined as stable permanent housing and does not include staying ouside in a car, in a tent, in an abandoned building, in an overnight shelter, or couch-surfing.) Yes   Are you worried  about losing your housing? No   Lack of transportation? No   Unable to get utilities (heat,electricity)? No         3/17/2025   Fall Risk   Fallen 2 or more times in the past year? No   Trouble with walking or balance? Yes   Gait Speed Test (Document in seconds) 7.1   Gait Speed Test Interpretation Greater than 5.01 seconds - ABNORMAL          3/17/2025   Activities of Daily Living- Home Safety   Needs help with the following daily activites None of the above   Safety concerns in the home None of the above         3/17/2025   Dental   Dentist two times every year? (!) NO         3/17/2025   Hearing Screening   Hearing concerns? None of the above         3/17/2025   Driving Risk Screening   Patient/family members have concerns about driving (!) DECLINE         3/17/2025   General Alertness/Fatigue Screening   Have you been more tired than usual lately? (!) YES         3/17/2025   Urinary Incontinence Screening   Bothered by leaking urine in past 6 months No           Today's PHQ-2 Score:       3/17/2025    12:33 PM   PHQ-2 ( 1999 Pf[employer])   Q1: Little interest or pleasure in doing things 0    Q2: Feeling down, depressed or hopeless 0    PHQ-2 Score 0    Q1: Little interest or pleasure in doing things Not at all   Q2: Feeling down, depressed or hopeless Not at all   PHQ-2 Score 0       Proxy-reported           3/17/2025   Substance Use   Alcohol more than 3/day or more than 7/wk Not Applicable   Do you have a current opioid prescription? No   How severe/bad is pain from 1 to 10? 8/10   Do you use any other substances recreationally? No     Social History     Tobacco Use    Smoking status: Never    Smokeless tobacco: Never   Substance Use Topics    Alcohol use: No     Alcohol/week: 0.0 standard drinks of alcohol    Drug use: No           3/17/2023   LAST FHS-7 RESULTS   1st degree relative breast or ovarian cancer No   Any relative bilateral breast cancer No   Any male have breast cancer No   Any ONE woman have BOTH  breast AND ovarian cancer No   Any woman with breast cancer before 50yrs No   2 or more relatives with breast AND/OR ovarian cancer No   2 or more relatives with breast AND/OR bowel cancer No              History of abnormal Pap smear:        ASCVD Risk   The 10-year ASCVD risk score (George GUILLEN, et al., 2019) is: 17.2%    Values used to calculate the score:      Age: 69 years      Sex: Female      Is Non- : No      Diabetic: Yes      Tobacco smoker: No      Systolic Blood Pressure: 124 mmHg      Is BP treated: Yes      HDL Cholesterol: 52 mg/dL      Total Cholesterol: 225 mg/dL            Reviewed and updated as needed this visit by Provider                      Current providers sharing in care for this patient include:  Patient Care Team:  Yoni Baxter MD as PCP - General (Internal Medicine)  Yoni Baxter MD as Assigned PCP    The following health maintenance items are reviewed in Epic and correct as of today:  Health Maintenance   Topic Date Due    ANNUAL REVIEW OF HM ORDERS  Never done    MEDICARE ANNUAL WELLNESS VISIT  05/24/2023    COVID-19 Vaccine (4 - 2024-25 season) 09/01/2024    MICROALBUMIN  03/12/2025    A1C  03/16/2025    MAMMO SCREENING  03/17/2025    EYE EXAM  05/13/2025    ADVANCE CARE PLANNING  09/04/2025    BMP  09/16/2025    LIPID  09/16/2025    DIABETIC FOOT EXAM  09/16/2025    FALL RISK ASSESSMENT  03/17/2026    COLORECTAL CANCER SCREENING  06/24/2029    DTAP/TDAP/TD IMMUNIZATION (4 - Td or Tdap) 05/24/2032    DEXA  10/02/2035    HEPATITIS C SCREENING  Completed    PHQ-2 (once per calendar year)  Completed    INFLUENZA VACCINE  Completed    Pneumococcal Vaccine: 50+ Years  Completed    ZOSTER IMMUNIZATION  Completed    RSV VACCINE  Completed    HPV IMMUNIZATION  Aged Out    MENINGITIS IMMUNIZATION  Aged Out            Objective    Exam  /82 (BP Location: Left arm, Patient Position: Sitting, Cuff Size: Adult Regular)   Pulse 68   Temp  "98.4  F (36.9  C)   Resp 12   Ht 1.416 m (4' 7.75\")   Wt 69.9 kg (154 lb)   SpO2 97%   BMI 34.84 kg/m     Estimated body mass index is 34.84 kg/m  as calculated from the following:    Height as of this encounter: 1.416 m (4' 7.75\").    Weight as of this encounter: 69.9 kg (154 lb).    Physical Exam           3/17/2025   Mini Cog   Clock Draw Score 2 Normal   3 Item Recall 3 objects recalled   Mini Cog Total Score 5              Signed Electronically by: Yoni Baxter MD    "

## 2025-03-18 LAB
ALBUMIN SERPL BCG-MCNC: 4.2 G/DL (ref 3.5–5.2)
ALP SERPL-CCNC: 99 U/L (ref 40–150)
ALT SERPL W P-5'-P-CCNC: 16 U/L (ref 0–50)
ANION GAP SERPL CALCULATED.3IONS-SCNC: 13 MMOL/L (ref 7–15)
AST SERPL W P-5'-P-CCNC: 20 U/L (ref 0–45)
BILIRUB SERPL-MCNC: 0.3 MG/DL
BUN SERPL-MCNC: 14 MG/DL (ref 8–23)
CALCIUM SERPL-MCNC: 9.9 MG/DL (ref 8.8–10.4)
CHLORIDE SERPL-SCNC: 104 MMOL/L (ref 98–107)
CHOLEST SERPL-MCNC: 263 MG/DL
CREAT SERPL-MCNC: 0.58 MG/DL (ref 0.51–0.95)
CREAT UR-MCNC: 171 MG/DL
EGFRCR SERPLBLD CKD-EPI 2021: >90 ML/MIN/1.73M2
FASTING STATUS PATIENT QL REPORTED: YES
FASTING STATUS PATIENT QL REPORTED: YES
GLUCOSE SERPL-MCNC: 127 MG/DL (ref 70–99)
HCO3 SERPL-SCNC: 25 MMOL/L (ref 22–29)
HDLC SERPL-MCNC: 51 MG/DL
LDLC SERPL CALC-MCNC: 167 MG/DL
MICROALBUMIN UR-MCNC: 28.5 MG/L
MICROALBUMIN/CREAT UR: 16.67 MG/G CR (ref 0–25)
NONHDLC SERPL-MCNC: 212 MG/DL
POTASSIUM SERPL-SCNC: 4.7 MMOL/L (ref 3.4–5.3)
PROT SERPL-MCNC: 7.5 G/DL (ref 6.4–8.3)
SODIUM SERPL-SCNC: 142 MMOL/L (ref 135–145)
TRIGL SERPL-MCNC: 226 MG/DL
TSH SERPL DL<=0.005 MIU/L-ACNC: 1.42 UIU/ML (ref 0.3–4.2)
VIT D+METAB SERPL-MCNC: 27 NG/ML (ref 20–50)

## 2025-03-19 NOTE — RESULT ENCOUNTER NOTE
It was very nice to see you.  You should be able to view all of your test results.    Your CBC or blood count is normal with no signs of anemia or blood disorders.  Your diabetes test or hemoglobin A1c is a bit better this year at 6.9 which is great.  Your urine is clear as well.    Your blood salts, kidney tests, liver tests, and proteins are all normal.    Your total cholesterol is too high.  Your HDL or good cholesterol is fine but the LDL or bad is too high as well.  Based on the numbers I would imagine you are not taking the cholesterol medication, the simvastatin.  I think it is very important that you take this.  Please send me a note back and let me know if you have been taking it.    The last tests that are normal include your vitamin D level and thyroid.    Please send me a note back regarding the cholesterol pill.    Yoni Baxter M.D.

## 2025-03-24 ENCOUNTER — OFFICE VISIT (OUTPATIENT)
Dept: URGENT CARE | Facility: URGENT CARE | Age: 70
End: 2025-03-24
Payer: COMMERCIAL

## 2025-03-24 VITALS
TEMPERATURE: 97.8 F | RESPIRATION RATE: 19 BRPM | BODY MASS INDEX: 35.29 KG/M2 | DIASTOLIC BLOOD PRESSURE: 92 MMHG | HEART RATE: 77 BPM | SYSTOLIC BLOOD PRESSURE: 154 MMHG | OXYGEN SATURATION: 98 % | WEIGHT: 156 LBS

## 2025-03-24 DIAGNOSIS — R10.13 ABDOMINAL PAIN, EPIGASTRIC: ICD-10-CM

## 2025-03-24 DIAGNOSIS — I10 BENIGN ESSENTIAL HYPERTENSION: ICD-10-CM

## 2025-03-24 DIAGNOSIS — K21.00 GASTROESOPHAGEAL REFLUX DISEASE WITH ESOPHAGITIS WITHOUT HEMORRHAGE: Primary | ICD-10-CM

## 2025-03-24 LAB
ALBUMIN SERPL-MCNC: 3.9 G/DL (ref 3.4–5)
ALP SERPL-CCNC: 68 U/L (ref 40–150)
ALT SERPL W P-5'-P-CCNC: 14 U/L (ref 0–50)
ANION GAP SERPL CALCULATED.3IONS-SCNC: 8 MMOL/L (ref 3–14)
AST SERPL W P-5'-P-CCNC: 21 U/L (ref 0–45)
BASOPHILS # BLD AUTO: 0 10E3/UL (ref 0–0.2)
BASOPHILS NFR BLD AUTO: 0 %
BILIRUB SERPL-MCNC: 0.8 MG/DL (ref 0.2–1.3)
BUN SERPL-MCNC: 10 MG/DL (ref 7–30)
CALCIUM SERPL-MCNC: 10 MG/DL (ref 8.5–10.1)
CHLORIDE BLD-SCNC: 109 MMOL/L (ref 94–109)
CO2 SERPL-SCNC: 29 MMOL/L (ref 20–32)
CREAT SERPL-MCNC: 0.6 MG/DL (ref 0.52–1.04)
EGFRCR SERPLBLD CKD-EPI 2021: >90 ML/MIN/1.73M2
EOSINOPHIL # BLD AUTO: 0.1 10E3/UL (ref 0–0.7)
EOSINOPHIL NFR BLD AUTO: 1 %
ERYTHROCYTE [DISTWIDTH] IN BLOOD BY AUTOMATED COUNT: 13.7 % (ref 10–15)
GLUCOSE BLD-MCNC: 112 MG/DL (ref 70–99)
HCT VFR BLD AUTO: 37.7 % (ref 35–47)
HGB BLD-MCNC: 12 G/DL (ref 11.7–15.7)
IMM GRANULOCYTES # BLD: 0 10E3/UL
IMM GRANULOCYTES NFR BLD: 0 %
LYMPHOCYTES # BLD AUTO: 2.8 10E3/UL (ref 0.8–5.3)
LYMPHOCYTES NFR BLD AUTO: 31 %
MCH RBC QN AUTO: 27.5 PG (ref 26.5–33)
MCHC RBC AUTO-ENTMCNC: 31.8 G/DL (ref 31.5–36.5)
MCV RBC AUTO: 86 FL (ref 78–100)
MONOCYTES # BLD AUTO: 0.6 10E3/UL (ref 0–1.3)
MONOCYTES NFR BLD AUTO: 6 %
NEUTROPHILS # BLD AUTO: 5.5 10E3/UL (ref 1.6–8.3)
NEUTROPHILS NFR BLD AUTO: 61 %
PLATELET # BLD AUTO: 272 10E3/UL (ref 150–450)
POTASSIUM BLD-SCNC: 5.3 MMOL/L (ref 3.4–5.3)
PROT SERPL-MCNC: 7.7 G/DL (ref 6.8–8.8)
RBC # BLD AUTO: 4.37 10E6/UL (ref 3.8–5.2)
SODIUM SERPL-SCNC: 146 MMOL/L (ref 135–145)
TROPONIN T SERPL HS-MCNC: 12 NG/L
WBC # BLD AUTO: 8.9 10E3/UL (ref 4–11)

## 2025-03-24 PROCEDURE — 85025 COMPLETE CBC W/AUTO DIFF WBC: CPT | Performed by: EMERGENCY MEDICINE

## 2025-03-24 PROCEDURE — 84484 ASSAY OF TROPONIN QUANT: CPT | Performed by: EMERGENCY MEDICINE

## 2025-03-24 PROCEDURE — 93000 ELECTROCARDIOGRAM COMPLETE: CPT | Performed by: EMERGENCY MEDICINE

## 2025-03-24 PROCEDURE — 36415 COLL VENOUS BLD VENIPUNCTURE: CPT | Performed by: EMERGENCY MEDICINE

## 2025-03-24 PROCEDURE — 3077F SYST BP >= 140 MM HG: CPT | Performed by: EMERGENCY MEDICINE

## 2025-03-24 PROCEDURE — 3080F DIAST BP >= 90 MM HG: CPT | Performed by: EMERGENCY MEDICINE

## 2025-03-24 PROCEDURE — 80053 COMPREHEN METABOLIC PANEL: CPT | Performed by: EMERGENCY MEDICINE

## 2025-03-24 PROCEDURE — 99214 OFFICE O/P EST MOD 30 MIN: CPT | Performed by: EMERGENCY MEDICINE

## 2025-03-24 RX ORDER — FAMOTIDINE 20 MG/1
20 TABLET, FILM COATED ORAL 2 TIMES DAILY
Qty: 28 TABLET | Refills: 0 | Status: SHIPPED | OUTPATIENT
Start: 2025-03-24 | End: 2025-04-07

## 2025-03-24 NOTE — PROGRESS NOTES
Assessment & Plan     Diagnosis:    ICD-10-CM    1. Gastroesophageal reflux disease with esophagitis without hemorrhage  K21.00 lidocaine (viscous) (XYLOCAINE) 2 % 15 mL, alum & mag hydroxide-simethicone (MAALOX) 15 mL GI Cocktail     Troponin T, High Sensitivity     CBC with platelets and differential     Comprehensive metabolic panel (BMP + Alb, Alk Phos, ALT, AST, Total. Bili, TP)     Troponin T, High Sensitivity     CBC with platelets and differential     Comprehensive metabolic panel (BMP + Alb, Alk Phos, ALT, AST, Total. Bili, TP)     famotidine (PEPCID) 20 MG tablet      2. Abdominal pain, epigastric  R10.13 lidocaine (viscous) (XYLOCAINE) 2 % 15 mL, alum & mag hydroxide-simethicone (MAALOX) 15 mL GI Cocktail     Troponin T, High Sensitivity     CBC with platelets and differential     Comprehensive metabolic panel (BMP + Alb, Alk Phos, ALT, AST, Total. Bili, TP)     Troponin T, High Sensitivity     CBC with platelets and differential     Comprehensive metabolic panel (BMP + Alb, Alk Phos, ALT, AST, Total. Bili, TP)      3. Benign essential hypertension  I10           Medical Decision Making:  Teresa Herron is a 69 year old female who presents for evaluation of chest pain and epigastric abdominal pain.  I considered a broad differential diagnosis for this patient including gastritis, PUD, GERD, cholecystitis, choledocholithiasis, biliary colic, pancreatitis, colonic or small bowel pathology, ACS, PE, esophageal rupture. Vital signs are within normal limits aside from mildly elevated blood pressure. Signs and symptoms here are consistent with gastritis.  Patient experienced relief here with GI cocktail. Given extent of relief with GI cocktail would not do a further workup including labs and/or ultrasound/CT for etiologies such as pancreatitis or cholecystitis. Patient with no tenderness in the RLQ or RUQ with deep palpation; negative Hurt's sign.     There are no signs of any serious etiologies as  "mentioned above or intraabdominal catastrophes. Given history and exam, I highly doubt this is a PE or acute coronary syndrome; patient is with epigastric tenderness, is low risk by well's criteria.  High-sensitivity troponin testing is negative with greater than 8 hours of constant chest/abdominal pain, this effectively rules out ACS; would not do any further workup for this.  Doubt perforated ulcer at this point.  Will refer back to primary and do scheduled medication for stomach acid reduction x 14 days. Consider endoscopy if further symptoms despite this.  Gastritis precautions given for home.  Patient verbalizes understanding and agreement with the plan including reasons to go to the ER. All questions answered.     35 minutes spent by me on the date of the encounter doing chart review, review of outside records, review of test results, interpretation of tests, patient visit, and documentation       Lamont Norwood PA-C  [facility]    Subjective     Teresa Herron is a 69 year old female who presents to clinic today for the following health issues:  Chief Complaint   Patient presents with    Chest Pain     Mid chest pain which started this morning. Patient have GERD.        HPI  Patient with history of GERD reports they have been experiencing chest pain and epigastric abdominal pain over the since this morning; constant and seems worse with laying down. Has history of GERD but is not taking any of her heart burn medications.  They rate pain at 3/10 in severity at this time and describe it as \"burning.\" They note the pain does not radiate to the back or flank. Patient notes no excessive alcohol or NSAID/Steroid use.    Patient denies any nausea, vomiting, diarrhea, dark or bloody stools or emesis, hemoptysis, shortness of breath, back or flank pain, fevers, urinary symptoms, lightheadedness/dizziness, numbness or weakness.       Review of Systems    See HPI    Objective      Vitals: BP (!) " 154/92   Pulse 77   Temp 97.8  F (36.6  C) (Oral)   Resp 19   Wt 70.8 kg (156 lb)   SpO2 98%   BMI 35.29 kg/m      Vital signs reviewed by: Lamont Norwood PA-C    Physical Exam   Constitutional: Patient is alert and cooperative. Mild acute distress.  Cardiovascular: Regular rate and rhythm. No murmur or rubs. Radial pulses 2+ and symmetric.   Pulmonary/Chest: Lungs are clear to auscultation throughout. Effort normal. No respiratory distress. No wheezes, rales or rhonchi. No point tenderness over the sternum or costosternal margins bilaterally.   GI: Epigastric tenderness to palpation without rebound or guarding. Negative Hurt's sign. No McBurney point tenderness. No CVA tenderness bilaterally.   Skin: No rash noted on visualized skin.  Psychiatric:The patient has a normal mood and affect.       Labs/Imaging:  Results for orders placed or performed in visit on 03/24/25   Troponin T, High Sensitivity     Status: Normal   Result Value Ref Range    Troponin T, High Sensitivity 12 <=14 ng/L   Comprehensive metabolic panel (BMP + Alb, Alk Phos, ALT, AST, Total. Bili, TP)     Status: Abnormal   Result Value Ref Range    Sodium 146 (H) 135 - 145 mmol/L    Potassium 5.3 3.4 - 5.3 mmol/L    Chloride 109 94 - 109 mmol/L    Carbon Dioxide (CO2) 29 20 - 32 mmol/L    Anion Gap 8 3 - 14 mmol/L    Urea Nitrogen 10 7 - 30 mg/dL    Creatinine 0.60 0.52 - 1.04 mg/dL    GFR Estimate >90 >60 mL/min/1.73m2    Calcium 10.0 8.5 - 10.1 mg/dL    Glucose 112 (H) 70 - 99 mg/dL    Alkaline Phosphatase 68 40 - 150 U/L    AST 21 0 - 45 U/L    ALT 14 0 - 50 U/L    Protein Total 7.7 6.8 - 8.8 g/dL    Albumin 3.9 3.4 - 5.0 g/dL    Bilirubin Total 0.8 0.2 - 1.3 mg/dL   CBC with platelets and differential     Status: None   Result Value Ref Range    WBC Count 8.9 4.0 - 11.0 10e3/uL    RBC Count 4.37 3.80 - 5.20 10e6/uL    Hemoglobin 12.0 11.7 - 15.7 g/dL    Hematocrit 37.7 35.0 - 47.0 %    MCV 86 78 - 100 fL    MCH 27.5 26.5 - 33.0 pg    MCHC  31.8 31.5 - 36.5 g/dL    RDW 13.7 10.0 - 15.0 %    Platelet Count 272 150 - 450 10e3/uL    % Neutrophils 61 %    % Lymphocytes 31 %    % Monocytes 6 %    % Eosinophils 1 %    % Basophils 0 %    % Immature Granulocytes 0 %    Absolute Neutrophils 5.5 1.6 - 8.3 10e3/uL    Absolute Lymphocytes 2.8 0.8 - 5.3 10e3/uL    Absolute Monocytes 0.6 0.0 - 1.3 10e3/uL    Absolute Eosinophils 0.1 0.0 - 0.7 10e3/uL    Absolute Basophils 0.0 0.0 - 0.2 10e3/uL    Absolute Immature Granulocytes 0.0 <=0.4 10e3/uL   CBC with platelets and differential     Status: None    Narrative    The following orders were created for panel order CBC with platelets and differential.  Procedure                               Abnormality         Status                     ---------                               -----------         ------                     CBC with platelets and ...[[id number]]                      Final result                 Please view results for these tests on the individual orders.       Interventions:  GI Cocktail 30mL PO      Lamont Norwood PA-C, March 27, 2025

## 2025-05-17 ENCOUNTER — HEALTH MAINTENANCE LETTER (OUTPATIENT)
Age: 70
End: 2025-05-17

## 2025-07-08 ENCOUNTER — TRANSFERRED RECORDS (OUTPATIENT)
Dept: HEALTH INFORMATION MANAGEMENT | Facility: CLINIC | Age: 70
End: 2025-07-08
Payer: COMMERCIAL

## 2025-08-23 ENCOUNTER — OFFICE VISIT (OUTPATIENT)
Dept: URGENT CARE | Facility: URGENT CARE | Age: 70
End: 2025-08-23
Payer: COMMERCIAL

## 2025-08-23 VITALS
HEART RATE: 85 BPM | DIASTOLIC BLOOD PRESSURE: 98 MMHG | TEMPERATURE: 97.9 F | WEIGHT: 153 LBS | SYSTOLIC BLOOD PRESSURE: 162 MMHG | BODY MASS INDEX: 34.61 KG/M2 | OXYGEN SATURATION: 99 % | RESPIRATION RATE: 18 BRPM

## 2025-08-23 DIAGNOSIS — R05.8 PRODUCTIVE COUGH: ICD-10-CM

## 2025-08-23 DIAGNOSIS — J01.90 ACUTE SINUSITIS WITH COEXISTING CONDITION REQUIRING PROPHYLACTIC TREATMENT: Primary | ICD-10-CM

## 2025-08-23 PROCEDURE — 99213 OFFICE O/P EST LOW 20 MIN: CPT | Performed by: FAMILY MEDICINE

## 2025-08-23 PROCEDURE — 3077F SYST BP >= 140 MM HG: CPT | Performed by: FAMILY MEDICINE

## 2025-08-23 PROCEDURE — 87635 SARS-COV-2 COVID-19 AMP PRB: CPT | Performed by: FAMILY MEDICINE

## 2025-08-23 PROCEDURE — 3080F DIAST BP >= 90 MM HG: CPT | Performed by: FAMILY MEDICINE

## 2025-08-23 RX ORDER — DOXYCYCLINE 100 MG/1
100 TABLET ORAL 2 TIMES DAILY
Qty: 14 TABLET | Refills: 0 | Status: SHIPPED | OUTPATIENT
Start: 2025-08-23 | End: 2025-08-30

## 2025-08-23 RX ORDER — BENZONATATE 200 MG/1
200 CAPSULE ORAL 3 TIMES DAILY PRN
Qty: 21 CAPSULE | Refills: 0 | Status: SHIPPED | OUTPATIENT
Start: 2025-08-23 | End: 2025-08-30

## 2025-08-25 LAB — SARS-COV-2 RNA RESP QL NAA+PROBE: NEGATIVE

## (undated) DEVICE — SLEEVE TR BAND RADIAL COMPRESSION DEVICE 24CM TRB24-REG

## (undated) DEVICE — INTRO GLIDESHEATH SLENDER 6FR 10X45CM 60-1060

## (undated) DEVICE — KIT HAND CONTROL ANGIOTOUCH ACIST 65CM AT-P65

## (undated) DEVICE — CATH ANGIO INFINITI JR4 4FRX100CM 538421

## (undated) DEVICE — WIRE GUIDE 0.035"X260CM SAFE-T-J EXCHANGE G00517

## (undated) DEVICE — TOTE ANGIO CORP PC15AT SAN32CC83O

## (undated) DEVICE — DEFIB PRO-PADZ LVP LQD GEL ADULT 8900-2105-01

## (undated) DEVICE — GLIDEWIRE TERUMO .035X180CM 1.5,, J-TIP GR3525

## (undated) DEVICE — RAD G/W INQWIRE .035X260CM J-TIP EXCHANGE IQ35F260J1O5RS

## (undated) DEVICE — MANIFOLD KIT ANGIO AUTOMATED 014613

## (undated) DEVICE — 5FR X 100CM INFINITI TL DIAGNOSTIC CATHETER, JUDKINS LEFT CORONARY, JL 3.5, FEMORAL SELECTIVE THRULUMEN, SMALL, 0.038IN MAX GUIDEWIRE (EA/1)

## (undated) RX ORDER — FENTANYL CITRATE 50 UG/ML
INJECTION, SOLUTION INTRAMUSCULAR; INTRAVENOUS
Status: DISPENSED
Start: 2021-06-17

## (undated) RX ORDER — LIDOCAINE HYDROCHLORIDE 10 MG/ML
INJECTION, SOLUTION EPIDURAL; INFILTRATION; INTRACAUDAL; PERINEURAL
Status: DISPENSED
Start: 2021-06-17

## (undated) RX ORDER — FENTANYL CITRATE 50 UG/ML
INJECTION, SOLUTION INTRAMUSCULAR; INTRAVENOUS
Status: DISPENSED
Start: 2024-11-11

## (undated) RX ORDER — HEPARIN SODIUM 1000 [USP'U]/ML
INJECTION, SOLUTION INTRAVENOUS; SUBCUTANEOUS
Status: DISPENSED
Start: 2021-06-17

## (undated) RX ORDER — HEPARIN SODIUM 200 [USP'U]/100ML
INJECTION, SOLUTION INTRAVENOUS
Status: DISPENSED
Start: 2021-06-17

## (undated) RX ORDER — NITROGLYCERIN 5 MG/ML
VIAL (ML) INTRAVENOUS
Status: DISPENSED
Start: 2021-06-17

## (undated) RX ORDER — VERAPAMIL HYDROCHLORIDE 2.5 MG/ML
INJECTION, SOLUTION INTRAVENOUS
Status: DISPENSED
Start: 2021-06-17